# Patient Record
Sex: MALE | Race: WHITE | Employment: FULL TIME | URBAN - METROPOLITAN AREA
[De-identification: names, ages, dates, MRNs, and addresses within clinical notes are randomized per-mention and may not be internally consistent; named-entity substitution may affect disease eponyms.]

---

## 2017-04-04 ENCOUNTER — LAB ENCOUNTER (OUTPATIENT)
Dept: LAB | Age: 47
End: 2017-04-04
Attending: FAMILY MEDICINE
Payer: COMMERCIAL

## 2017-04-04 ENCOUNTER — OFFICE VISIT (OUTPATIENT)
Dept: FAMILY MEDICINE CLINIC | Facility: CLINIC | Age: 47
End: 2017-04-04

## 2017-04-04 VITALS
DIASTOLIC BLOOD PRESSURE: 84 MMHG | BODY MASS INDEX: 25.2 KG/M2 | HEIGHT: 71 IN | SYSTOLIC BLOOD PRESSURE: 142 MMHG | WEIGHT: 180 LBS | OXYGEN SATURATION: 95 % | HEART RATE: 84 BPM | RESPIRATION RATE: 16 BRPM

## 2017-04-04 DIAGNOSIS — Z00.00 LABORATORY EXAMINATION ORDERED AS PART OF A ROUTINE GENERAL MEDICAL EXAMINATION: ICD-10-CM

## 2017-04-04 DIAGNOSIS — Z91.09 ENVIRONMENTAL ALLERGIES: ICD-10-CM

## 2017-04-04 DIAGNOSIS — Z00.00 WELL ADULT EXAM: Primary | ICD-10-CM

## 2017-04-04 DIAGNOSIS — R73.01 IMPAIRED FASTING GLUCOSE: ICD-10-CM

## 2017-04-04 DIAGNOSIS — J45.40 ASTHMA, MODERATE PERSISTENT, POORLY-CONTROLLED: ICD-10-CM

## 2017-04-04 PROCEDURE — 83036 HEMOGLOBIN GLYCOSYLATED A1C: CPT

## 2017-04-04 PROCEDURE — 99396 PREV VISIT EST AGE 40-64: CPT | Performed by: FAMILY MEDICINE

## 2017-04-04 PROCEDURE — 82306 VITAMIN D 25 HYDROXY: CPT

## 2017-04-04 PROCEDURE — 80061 LIPID PANEL: CPT

## 2017-04-04 PROCEDURE — 36415 COLL VENOUS BLD VENIPUNCTURE: CPT

## 2017-04-04 PROCEDURE — 85025 COMPLETE CBC W/AUTO DIFF WBC: CPT

## 2017-04-04 PROCEDURE — 80053 COMPREHEN METABOLIC PANEL: CPT

## 2017-04-04 PROCEDURE — 84443 ASSAY THYROID STIM HORMONE: CPT

## 2017-04-04 RX ORDER — MONTELUKAST SODIUM 10 MG/1
10 TABLET ORAL NIGHTLY
Qty: 30 TABLET | Refills: 1 | Status: SHIPPED | OUTPATIENT
Start: 2017-04-04 | End: 2017-05-04

## 2017-04-04 RX ORDER — BUDESONIDE AND FORMOTEROL FUMARATE DIHYDRATE 160; 4.5 UG/1; UG/1
2 AEROSOL RESPIRATORY (INHALATION) 2 TIMES DAILY
Qty: 5 INHALER | Refills: 0 | COMMUNITY
Start: 2017-04-04 | End: 2017-10-31

## 2017-04-04 NOTE — PATIENT INSTRUCTIONS
Routine Healthcare for Men   Routine checkups can find treatable problems early. For many medical problems, early treatment can help prevent more serious complications. The value of checkups and how often you have them depend mainly on your age.  Your perso controversial. Many studies have been done, but they do not yet show that it is practical to do it on all men at their checkups. The test often gives misleading results and can cause undue anxiety, expense, and unnecessary medical procedures.  You should di whooping cough (pertussis) as well as tetanus. If you are 72 or older, this new vaccine has not yet been approved for your age group.  Because babies are most susceptible to complications from whooping cough, Tdap is especially recommended for adults caring vegetables in your diet. Get regular physical activity or exercise. Injury prevention: Use lap and shoulder belts when you drive. Use a helmet when you ride a motorcycle or bicycle.  If you are around guns or other firearms, practice safe handling and warren

## 2017-04-04 NOTE — PROGRESS NOTES
Hao Pop is a 55year old male who presents for a complete physical exam.   HPI:   Pt complains of using albuterol more lately. Has not had any Advair or Symbicort due to insurance coverage.  Was laid off last year and is working again but insurance 8.25 (H) <=0.34 kU/L   Allergen, [place] IgE 0.18 <=0.34 kU/L   Allergen, Short Ragweed IgE 5.46 (H) <=0.34 kU/L   Allergen, Pigweed IgE 0.88 (H) <=0.34 kU/L   Allergen, Russian Thistle IgE 1.52 (H) <=0.34 kU/L   Allergen, [place] Elder IgE 1.15 (H) < doesn't watch     REVIEW OF SYSTEMS:   GENERAL: feels well overall, denies fever or chills, denies change in weight  SKIN: denies any unusual skin lesions  EYES: denies changes in vision, doesn't wear glasses or contacts, cannot recall last eye exam  HENT: penile lesions  RECTAL: external anal sphincter appears normal, normal rectal tone, no masses,   Prostate: smooth, normal contours, NT, normal size.  No stool for hemoccult test.   MUSCULOSKELETAL: Back with normal AROM, no joint swelling, extremities x 4 w allergies  - Takes Zyrtec daily  - Will try and resend Singulair to pharmacy, pt unsure on cost, used previously in 2013 but was unable to afford at that time   Samples given of Symbicort patient will call when his samples run out.     3.. Laboratory examin history of type 2 diabetes   Colorectal cancer test: if you are 48 or older.  Recommended tests include a yearly test for blood in the stool, called the fecal occult blood test (FOBT) or fecal immunochemical test (FIT), and one of the following tests:   sig Remember, these are the minimum recommendations for routine tests. You and your healthcare provider must discuss what is right for you based on your symptoms and your personal and family medical history.    Many other tests are often done at routine check B is common. Pneumococcal pneumonia shot if you are age 72 or older. You may need to get it at a younger age if you have a high-risk medical condition, such as diabetes. Varicella (chickenpox) if you have never had chickenpox.    Zoster (shingles) vacci

## 2017-04-05 NOTE — PROGRESS NOTES
Quick Note:    Add hemoglobin A1c  Lab results showed low Vitamin D. Advise RX vitamin D 50,000 IU once weekly for 12 weeks. Recheck vitamin D level in 3 months. After labs we will direct you on the dose of vitamin D needed OTC.   Elevated neutrophils if an

## 2017-04-06 DIAGNOSIS — R73.01 IMPAIRED FASTING GLUCOSE: Primary | ICD-10-CM

## 2017-04-07 ENCOUNTER — TELEPHONE (OUTPATIENT)
Dept: FAMILY MEDICINE CLINIC | Facility: CLINIC | Age: 47
End: 2017-04-07

## 2017-04-07 DIAGNOSIS — E55.9 VITAMIN D DEFICIENCY: Primary | ICD-10-CM

## 2017-04-07 RX ORDER — ERGOCALCIFEROL 1.25 MG/1
50000 CAPSULE ORAL WEEKLY
Qty: 12 CAPSULE | Refills: 0 | Status: SHIPPED | OUTPATIENT
Start: 2017-04-07 | End: 2017-07-06

## 2017-04-07 NOTE — TELEPHONE ENCOUNTER
----- Message from Aicha Zafar PA-C sent at 4/4/2017  8:38 PM CDT -----  Add hemoglobin A1c  Lab results showed low Vitamin D. Advise RX vitamin D 50,000 IU once weekly for 12 weeks. Recheck vitamin D level in 3 months.   After labs we will direct yo

## 2017-04-07 NOTE — TELEPHONE ENCOUNTER
Notes Recorded by Rosalinda Valdes PA-C on 4/6/2017 at 4:47 PM  Normal HBA1C no diabetes.   Sent to my chart    Med sent to pharmacy and lab in system

## 2017-05-04 ENCOUNTER — OFFICE VISIT (OUTPATIENT)
Dept: FAMILY MEDICINE CLINIC | Facility: CLINIC | Age: 47
End: 2017-05-04

## 2017-05-04 VITALS
WEIGHT: 183 LBS | HEIGHT: 71 IN | HEART RATE: 84 BPM | OXYGEN SATURATION: 98 % | BODY MASS INDEX: 25.62 KG/M2 | SYSTOLIC BLOOD PRESSURE: 110 MMHG | DIASTOLIC BLOOD PRESSURE: 62 MMHG

## 2017-05-04 DIAGNOSIS — J30.1 SEASONAL ALLERGIC RHINITIS DUE TO POLLEN: Primary | ICD-10-CM

## 2017-05-04 DIAGNOSIS — J45.40 MODERATE PERSISTENT ASTHMA WITHOUT COMPLICATION: ICD-10-CM

## 2017-05-04 PROCEDURE — 99213 OFFICE O/P EST LOW 20 MIN: CPT | Performed by: FAMILY MEDICINE

## 2017-05-04 RX ORDER — MONTELUKAST SODIUM 10 MG/1
10 TABLET ORAL NIGHTLY
Qty: 30 TABLET | Refills: 11 | Status: SHIPPED | OUTPATIENT
Start: 2017-05-04 | End: 2018-05-27

## 2017-05-04 NOTE — PROGRESS NOTES
Samira Longoria is a 52year old male. HPI:   #1 follow up asthma/allergies  Singulair everyday. No cough no night time snoring now or congestion. Breathing good no shortness, quick acting 5 times per week at most using it less and less.  Using it whe rashes  RESPIRATORY: shortness of breath with exertion  CARDIOVASCULAR: denies chest pain on exertion  GI: denies abdominal pain and denies heartburn  NEURO: denies headaches  Musculoskeletal: No motor deficits    EXAM:   /62 mmHg  Pulse 84  Ht 71\"

## 2017-10-31 ENCOUNTER — OFFICE VISIT (OUTPATIENT)
Dept: FAMILY MEDICINE CLINIC | Facility: CLINIC | Age: 47
End: 2017-10-31

## 2017-10-31 VITALS
WEIGHT: 186 LBS | OXYGEN SATURATION: 98 % | HEIGHT: 71 IN | DIASTOLIC BLOOD PRESSURE: 80 MMHG | SYSTOLIC BLOOD PRESSURE: 114 MMHG | BODY MASS INDEX: 26.04 KG/M2 | HEART RATE: 64 BPM

## 2017-10-31 DIAGNOSIS — J45.40 MODERATE PERSISTENT ASTHMA WITHOUT COMPLICATION: Primary | ICD-10-CM

## 2017-10-31 DIAGNOSIS — J30.1 CHRONIC SEASONAL ALLERGIC RHINITIS DUE TO POLLEN: ICD-10-CM

## 2017-10-31 DIAGNOSIS — Z91.09 ENVIRONMENTAL ALLERGIES: ICD-10-CM

## 2017-10-31 PROCEDURE — 99213 OFFICE O/P EST LOW 20 MIN: CPT | Performed by: FAMILY MEDICINE

## 2017-10-31 RX ORDER — BUDESONIDE AND FORMOTEROL FUMARATE DIHYDRATE 160; 4.5 UG/1; UG/1
2 AEROSOL RESPIRATORY (INHALATION) 2 TIMES DAILY
Qty: 7 INHALER | Refills: 0 | COMMUNITY
Start: 2017-10-31 | End: 2018-04-05

## 2017-10-31 RX ORDER — ALBUTEROL SULFATE 90 UG/1
2 AEROSOL, METERED RESPIRATORY (INHALATION)
Qty: 1 INHALER | Refills: 1 | Status: SHIPPED | OUTPATIENT
Start: 2017-10-31 | End: 2018-03-14

## 2017-10-31 RX ORDER — BUDESONIDE AND FORMOTEROL FUMARATE DIHYDRATE 160; 4.5 UG/1; UG/1
2 AEROSOL RESPIRATORY (INHALATION) 2 TIMES DAILY
Qty: 1 INHALER | Refills: 5 | Status: SHIPPED | OUTPATIENT
Start: 2017-10-31 | End: 2018-04-05

## 2017-10-31 NOTE — PROGRESS NOTES
Sari Johnston is a 52year old male. HPI:   Patient is in for follow-up on asthma patient has a high deductible insurance plan is to pay cash for his prescriptions.   Ran out of the Symbicort samples 3 weeks ago started using the albuterol again 4-5 time REVIEW OF SYSTEMS:   GENERAL HEALTH: feels well otherwise  SKIN: denies any unusual skin lesions or rashes  RESPIRATORY: Without steroid inhaler albuterol gets shortness of breath with exertion otherwise has no issues  CARDIOVASCULAR: denies chest pain Inhale 2 puffs into the lungs 2 (two) times daily. Imaging & Consults:  None  1. Moderate persistent asthma without complication  Continue with singular  - Albuterol Sulfate HFA (VENTOLIN HFA) 108 (90 Base) MCG/ACT Inhalation Aero Soln;  Inhale 2

## 2018-04-05 ENCOUNTER — OFFICE VISIT (OUTPATIENT)
Dept: FAMILY MEDICINE CLINIC | Facility: CLINIC | Age: 48
End: 2018-04-05

## 2018-04-05 ENCOUNTER — LAB ENCOUNTER (OUTPATIENT)
Dept: LAB | Age: 48
End: 2018-04-05
Attending: FAMILY MEDICINE
Payer: COMMERCIAL

## 2018-04-05 VITALS
WEIGHT: 178 LBS | SYSTOLIC BLOOD PRESSURE: 110 MMHG | HEART RATE: 72 BPM | HEIGHT: 71 IN | BODY MASS INDEX: 24.92 KG/M2 | DIASTOLIC BLOOD PRESSURE: 78 MMHG

## 2018-04-05 DIAGNOSIS — R79.89 LOW VITAMIN D LEVEL: ICD-10-CM

## 2018-04-05 DIAGNOSIS — E55.9 VITAMIN D DEFICIENCY: ICD-10-CM

## 2018-04-05 DIAGNOSIS — J45.40 MODERATE PERSISTENT ASTHMA WITHOUT COMPLICATION: ICD-10-CM

## 2018-04-05 DIAGNOSIS — S39.012A STRAIN OF LUMBAR REGION, INITIAL ENCOUNTER: ICD-10-CM

## 2018-04-05 DIAGNOSIS — Z00.00 LABORATORY EXAMINATION ORDERED AS PART OF A ROUTINE GENERAL MEDICAL EXAMINATION: ICD-10-CM

## 2018-04-05 DIAGNOSIS — Z00.00 WELL ADULT EXAM: Primary | ICD-10-CM

## 2018-04-05 PROCEDURE — 82306 VITAMIN D 25 HYDROXY: CPT

## 2018-04-05 PROCEDURE — 84443 ASSAY THYROID STIM HORMONE: CPT

## 2018-04-05 PROCEDURE — 94010 BREATHING CAPACITY TEST: CPT | Performed by: FAMILY MEDICINE

## 2018-04-05 PROCEDURE — 99213 OFFICE O/P EST LOW 20 MIN: CPT | Performed by: FAMILY MEDICINE

## 2018-04-05 PROCEDURE — 80053 COMPREHEN METABOLIC PANEL: CPT

## 2018-04-05 PROCEDURE — 80061 LIPID PANEL: CPT

## 2018-04-05 PROCEDURE — 36415 COLL VENOUS BLD VENIPUNCTURE: CPT

## 2018-04-05 PROCEDURE — 99396 PREV VISIT EST AGE 40-64: CPT | Performed by: FAMILY MEDICINE

## 2018-04-05 PROCEDURE — 80050 GENERAL HEALTH PANEL: CPT

## 2018-04-05 PROCEDURE — 84439 ASSAY OF FREE THYROXINE: CPT

## 2018-04-05 RX ORDER — BUDESONIDE AND FORMOTEROL FUMARATE DIHYDRATE 160; 4.5 UG/1; UG/1
2 AEROSOL RESPIRATORY (INHALATION) 2 TIMES DAILY
Qty: 3 INHALER | Refills: 0 | COMMUNITY
Start: 2018-04-05 | End: 2018-06-22

## 2018-04-05 NOTE — PROGRESS NOTES
Gissell Person is a 52year old male who presents for a complete physical exam.   HPI:   Pt complains of Lower back pain started Sunday much better since last night. Patient denies any numbness, tingling or radiating pain.   Pain sits at the lower waist. HDL Cholesterol 69 >45 mg/dL   LDL Cholesterol 109 <130 mg/dL   VLDL 15 5 - 40 mg/dL   Chol/HDL Ratio 2.80 <4.97   Non HDL Chol 124 <130 mg/dL   -VITAMIN D, 25-HYDROXY   Result Value Ref Range   25-Hydroxyvitamin D (Total) 27.4 (L) 30.0 - 100.0 ng/mL   - Rfl:       Past Medical History:   Diagnosis Date   • Abscess of lung with pneumonia ([facility] [address] 04/2009    [facility]   • Asthma    • Chronic rhinitis     seasonal   • Extrinsic asthma, unspecified      mainly issue during allergy season, well contro of neck glands/polyuria/polydypsia  ALL/ASTHMA: denies allergic rhinitis/asthma    EXAM:   /78 (BP Location: Right arm, Patient Position: Sitting, Cuff Size: adult)   Pulse 72   Ht 71\"   Wt 178 lb   BMI 24.83 kg/m²   Body mass index is 24.83 kg/m². 04/01/2016      ASSESSMENT AND PLAN:   Rafael May is a 52year old male who presents for a complete physical exam.   Well adult exam  (primary encounter diagnosis)  Moderate persistent asthma without complication  Strain of lumbar region, recommendations and agrees to the plan. The patient is asked to return for CPX in 1 year every 6 months for astham and as needed. Also, for nurse visit in the Fall for influenza immunization.

## 2018-04-07 NOTE — PROGRESS NOTES
Cholesterol slightly increased from 1 year ago watch saturated fats repeat yearly. Improved Vitamin D take maintenance of dose of 1,000 iu vitamin D OTC.   Rest of labs are normal  To my chart

## 2018-05-27 DIAGNOSIS — J45.40 MODERATE PERSISTENT ASTHMA WITHOUT COMPLICATION: ICD-10-CM

## 2018-05-28 RX ORDER — MONTELUKAST SODIUM 10 MG/1
10 TABLET ORAL NIGHTLY
Qty: 90 TABLET | Refills: 0 | Status: SHIPPED | OUTPATIENT
Start: 2018-05-28 | End: 2018-08-24

## 2018-05-28 RX ORDER — ALBUTEROL SULFATE 90 UG/1
2 AEROSOL, METERED RESPIRATORY (INHALATION)
Qty: 1 INHALER | Refills: 1 | Status: SHIPPED | OUTPATIENT
Start: 2018-05-28 | End: 2020-01-15

## 2018-05-28 NOTE — TELEPHONE ENCOUNTER
From: Alonzo Jerome  Sent: 5/27/2018 4:50 PM CDT  Subject: Medication Renewal Request    Marshall JOJO Perez would like a refill of the following medications:     Montelukast Sodium 10 MG Oral Tab Sil Palencia PA-C]     Albuterol Sulfate HFA (VENTOLIN

## 2018-06-21 ENCOUNTER — PATIENT MESSAGE (OUTPATIENT)
Dept: FAMILY MEDICINE CLINIC | Facility: CLINIC | Age: 48
End: 2018-06-21

## 2018-06-21 DIAGNOSIS — J45.40 MODERATE PERSISTENT ASTHMA WITHOUT COMPLICATION: ICD-10-CM

## 2018-06-22 ENCOUNTER — PATIENT MESSAGE (OUTPATIENT)
Dept: FAMILY MEDICINE CLINIC | Facility: CLINIC | Age: 48
End: 2018-06-22

## 2018-06-22 DIAGNOSIS — J45.40 MODERATE PERSISTENT ASTHMA WITHOUT COMPLICATION: ICD-10-CM

## 2018-06-22 RX ORDER — BUDESONIDE AND FORMOTEROL FUMARATE DIHYDRATE 160; 4.5 UG/1; UG/1
2 AEROSOL RESPIRATORY (INHALATION) 2 TIMES DAILY
Qty: 3 INHALER | Refills: 0 | Status: SHIPPED | OUTPATIENT
Start: 2018-06-22 | End: 2018-07-26

## 2018-06-22 NOTE — TELEPHONE ENCOUNTER
----- Message from [employer]. Franklin Ralph sent at 6/21/2018  6:37 PM CDT -----  Regarding: Prescription Question  Contact: [phone]  Ant Goel, hope your summer is going well so far!  Can you send in a prescription for the Symbacort so I can see if the [facility]

## 2018-07-19 NOTE — TELEPHONE ENCOUNTER
----- Message from [employer] Energy[employer]areed Meek Kapoor sent at 7/19/2018  1:41 PM CDT -----  Regarding: RE: RE: RE: RE: RE: Prescription Question  Contact: [phone]  Yes address is correct. Can you ask Lacie Scheuermann if there are any generic forms of Symbacort?  I am having tro AM  To: Lewis Mesa  Subject: RE: Prescription Question    Can I see if we have any coupon cards that may help you out? Or have you looked at www.[employer]? Thanks  Ronnie Must    ----- Message -----     From: Lewis Ribera Left: 6/22/2018 11:31

## 2018-07-19 NOTE — TELEPHONE ENCOUNTER
Coupon card mailed to patient for Symbicort. In the meantime, Vickey Larsen would you want him to try/do any other inhaler?   thanks

## 2018-07-26 RX ORDER — BUDESONIDE AND FORMOTEROL FUMARATE DIHYDRATE 160; 4.5 UG/1; UG/1
2 AEROSOL RESPIRATORY (INHALATION) 2 TIMES DAILY
Qty: 3 INHALER | Refills: 0 | Status: SHIPPED | OUTPATIENT
Start: 2018-07-26 | End: 2018-11-07

## 2018-07-26 NOTE — TELEPHONE ENCOUNTER
----- Message from [employer]. Robbadriángonzalez Garcia sent at 7/26/2018  1:32 PM CDT -----  Regarding: RE: RE: RE: RE: RE: RE: RE: RE: RE: Prescription Question  Contact: [phone]  Good afternoon. I received the coupon card yesterday.  Can you call in another prescriptio to give you advair samples and we have 2 samples here if you would like them? There are no generic forms of Symbicort and the Advair may help until you get going on the Symbicort with the coupon card.   Let me know  Thanks  Dee Saleh    ----- Message ----- 6/22/2018 11:54 AM CDT       To: David Amaro PA-C  Subject: RE: RE: Prescription Question      If you have any coupon cards that may help. I'll check out the website again too.     Thanks,  Jose Bishop  ----- Message -----  From: Rickie Romero  Sent: 6/22/18 11:52

## 2018-08-24 RX ORDER — MONTELUKAST SODIUM 10 MG/1
10 TABLET ORAL NIGHTLY
Qty: 90 TABLET | Refills: 1 | Status: SHIPPED
Start: 2018-08-24 | End: 2019-02-24

## 2018-08-24 NOTE — TELEPHONE ENCOUNTER
From: Vilma Linton  Sent: 8/24/2018 9:29 AM CDT  Subject: Medication Renewal Request    Marshall JOJO Vidales would like a refill of the following medications:     Montelukast Sodium 10 MG Oral Tab John Kelley PA-C]    Preferred pharmacy: [facility]

## 2018-11-06 ENCOUNTER — PATIENT MESSAGE (OUTPATIENT)
Dept: FAMILY MEDICINE CLINIC | Facility: CLINIC | Age: 48
End: 2018-11-06

## 2018-11-06 DIAGNOSIS — J45.40 MODERATE PERSISTENT ASTHMA WITHOUT COMPLICATION: ICD-10-CM

## 2018-11-07 RX ORDER — BUDESONIDE AND FORMOTEROL FUMARATE DIHYDRATE 160; 4.5 UG/1; UG/1
2 AEROSOL RESPIRATORY (INHALATION) 2 TIMES DAILY
Qty: 3 INHALER | Refills: 0 | Status: SHIPPED | OUTPATIENT
Start: 2018-11-07 | End: 2019-03-12

## 2018-11-07 NOTE — TELEPHONE ENCOUNTER
Regarding: Non-Urgent Medical Question  Contact: [phone]  ----- Message from [employer] sent at 11/6/2018  6:58 PM CST -----    Good morning. Can you call in a new symbacort prescription please? I will be out in about a week. Thanks!   Wen Banda

## 2019-02-25 RX ORDER — MONTELUKAST SODIUM 10 MG/1
10 TABLET ORAL NIGHTLY
Qty: 90 TABLET | Refills: 0 | Status: SHIPPED | OUTPATIENT
Start: 2019-02-25 | End: 2019-05-29

## 2019-03-12 DIAGNOSIS — J45.40 MODERATE PERSISTENT ASTHMA WITHOUT COMPLICATION: ICD-10-CM

## 2019-03-13 RX ORDER — BUDESONIDE AND FORMOTEROL FUMARATE DIHYDRATE 160; 4.5 UG/1; UG/1
2 AEROSOL RESPIRATORY (INHALATION) 2 TIMES DAILY
Qty: 1 INHALER | Refills: 0 | Status: SHIPPED | OUTPATIENT
Start: 2019-03-13 | End: 2019-04-23

## 2019-03-13 NOTE — TELEPHONE ENCOUNTER
Inhaler approved qty 1 NR  Patient due for annual wellness 4/6/19 or later  Please call to schedule appt-will need for any further refills  [phone] (home)

## 2019-04-23 ENCOUNTER — OFFICE VISIT (OUTPATIENT)
Dept: FAMILY MEDICINE CLINIC | Facility: CLINIC | Age: 49
End: 2019-04-23
Payer: COMMERCIAL

## 2019-04-23 VITALS
WEIGHT: 181 LBS | HEART RATE: 69 BPM | HEIGHT: 71 IN | DIASTOLIC BLOOD PRESSURE: 76 MMHG | SYSTOLIC BLOOD PRESSURE: 120 MMHG | BODY MASS INDEX: 25.34 KG/M2

## 2019-04-23 DIAGNOSIS — Z00.00 LABORATORY EXAMINATION ORDERED AS PART OF A ROUTINE GENERAL MEDICAL EXAMINATION: ICD-10-CM

## 2019-04-23 DIAGNOSIS — J45.40 MODERATE PERSISTENT ASTHMA WITHOUT COMPLICATION: ICD-10-CM

## 2019-04-23 DIAGNOSIS — Z00.00 ROUTINE GENERAL MEDICAL EXAMINATION AT A HEALTH CARE FACILITY: Primary | ICD-10-CM

## 2019-04-23 DIAGNOSIS — E55.9 VITAMIN D DEFICIENCY: ICD-10-CM

## 2019-04-23 PROBLEM — S39.012A STRAIN OF LUMBAR REGION: Status: RESOLVED | Noted: 2018-04-05 | Resolved: 2019-04-23

## 2019-04-23 PROCEDURE — 99396 PREV VISIT EST AGE 40-64: CPT | Performed by: FAMILY MEDICINE

## 2019-04-23 RX ORDER — BUDESONIDE AND FORMOTEROL FUMARATE DIHYDRATE 160; 4.5 UG/1; UG/1
2 AEROSOL RESPIRATORY (INHALATION) 2 TIMES DAILY
Qty: 1 INHALER | Refills: 11 | Status: SHIPPED | OUTPATIENT
Start: 2019-04-23 | End: 2020-08-19

## 2019-04-23 NOTE — PROGRESS NOTES
Brina Marks is a 50year old male who presents for a complete physical exam.   HPI:   Here for CPE  Asthma does excellent with the Symbicort presently. Last colonoscopy believes it was in 2009 during hospitalization for lung abscess.   No FH colon can RBC 5.17 4.30 - 5.70 x10(6)uL    HGB 16.1 13.0 - 17.0 g/dL    HCT 49.3 37.0 - 53.0 %    .0 150.0 - 450.0 10(3)uL    MCV 95.4 80.0 - 99.0 fL    MCH 31.1 27.0 - 33.2 pg    MCHC 32.7 31.0 - 37.0 g/dL    RDW 12.0 11.5 - 16.0 %    RDW-SD 42.5 35.1 - 46. 3 status: Former Smoker        Packs/day: 1.50        Years: 20.00        Pack years: 30        Types: Cigarettes        Quit date: 4/3/2009        Years since quitting: 10.0      Smokeless tobacco: Former User        Types: [address] date: 1/1/2009 non-injected and non-icteric  NECK: supple, no adenopathy/thyromegaly/thyroid nodules/masses  CHEST: no chest tenderness  BREAST: no suspicious masses appreciated on palpation  LUNGS: CTA A/P, no wheezes/ronchi/rales/crackles, normal air excursion  CARDIOV Budesonide-Formoterol Fumarate 160-4.5 MCG/ACT Inhalation Aerosol 1 Inhaler 11     Sig: Inhale 2 puffs into the lungs 2 (two) times daily. Imaging & Consults:  None  1.  Routine general medical examination at a health care facility  Recommend healthy

## 2019-05-29 RX ORDER — MONTELUKAST SODIUM 10 MG/1
10 TABLET ORAL NIGHTLY
Qty: 90 TABLET | Refills: 2 | Status: SHIPPED | OUTPATIENT
Start: 2019-05-29 | End: 2020-02-19

## 2019-09-12 ENCOUNTER — PATIENT MESSAGE (OUTPATIENT)
Dept: FAMILY MEDICINE CLINIC | Facility: CLINIC | Age: 49
End: 2019-09-12

## 2019-09-12 NOTE — TELEPHONE ENCOUNTER
From: Bunny Juares  To: Tatyana Eng PA-C  Sent: 9/12/2019 7:17 AM CDT  Subject: Other    Good morning. I think this is a sty. Should I be doing anything to treat it or just wait 7-10 like I've been reading on line for it to go away?   Thanks,  Taye Salas

## 2019-09-14 ENCOUNTER — PATIENT MESSAGE (OUTPATIENT)
Dept: FAMILY MEDICINE CLINIC | Facility: CLINIC | Age: 49
End: 2019-09-14

## 2019-09-16 NOTE — TELEPHONE ENCOUNTER
From: Stacia Reyes  To: Lin Arias PA-C  Sent: 9/14/2019 2:07 PM CDT  Subject: Other    I got my flu shot today 9.14.19 @ [facility] [place] @ [address] [place] [address].   Thanks,  [employer]

## 2020-01-06 ENCOUNTER — PATIENT MESSAGE (OUTPATIENT)
Dept: FAMILY MEDICINE CLINIC | Facility: CLINIC | Age: 50
End: 2020-01-06

## 2020-01-07 ENCOUNTER — OFFICE VISIT (OUTPATIENT)
Dept: FAMILY MEDICINE CLINIC | Facility: CLINIC | Age: 50
End: 2020-01-07
Payer: COMMERCIAL

## 2020-01-07 ENCOUNTER — PATIENT MESSAGE (OUTPATIENT)
Dept: FAMILY MEDICINE CLINIC | Facility: CLINIC | Age: 50
End: 2020-01-07

## 2020-01-07 ENCOUNTER — HOSPITAL ENCOUNTER (OUTPATIENT)
Dept: GENERAL RADIOLOGY | Age: 50
Discharge: HOME | End: 2020-01-07
Attending: FAMILY MEDICINE
Payer: COMMERCIAL

## 2020-01-07 VITALS
TEMPERATURE: 98 F | BODY MASS INDEX: 24.92 KG/M2 | SYSTOLIC BLOOD PRESSURE: 138 MMHG | WEIGHT: 180 LBS | DIASTOLIC BLOOD PRESSURE: 84 MMHG | HEART RATE: 76 BPM | OXYGEN SATURATION: 98 % | HEIGHT: 71.14 IN

## 2020-01-07 DIAGNOSIS — J45.41 MODERATE PERSISTENT ASTHMA WITH ACUTE EXACERBATION: ICD-10-CM

## 2020-01-07 DIAGNOSIS — R07.9 RIGHT-SIDED CHEST PAIN: ICD-10-CM

## 2020-01-07 DIAGNOSIS — Z87.09: ICD-10-CM

## 2020-01-07 DIAGNOSIS — R06.02 SHORTNESS OF BREATH: ICD-10-CM

## 2020-01-07 DIAGNOSIS — Z87.09 HISTORY OF PNEUMOTHORAX: ICD-10-CM

## 2020-01-07 DIAGNOSIS — J45.41 MODERATE PERSISTENT ASTHMA WITH ACUTE EXACERBATION: Primary | ICD-10-CM

## 2020-01-07 PROCEDURE — 99214 OFFICE O/P EST MOD 30 MIN: CPT | Performed by: FAMILY MEDICINE

## 2020-01-07 PROCEDURE — 71046 X-RAY EXAM CHEST 2 VIEWS: CPT | Performed by: FAMILY MEDICINE

## 2020-01-07 RX ORDER — METHYLPREDNISOLONE 4 MG/1
TABLET ORAL
Qty: 1 PACKAGE | Refills: 0 | Status: SHIPPED | OUTPATIENT
Start: 2020-01-07 | End: 2020-08-19 | Stop reason: ALTCHOICE

## 2020-01-07 NOTE — TELEPHONE ENCOUNTER
From: Naida Knows  To: Katy Xavier PA-C  Sent: 1/6/2020 5:00 PM CST  Subject: Other    Good afternoon. I hope everyone had a great holiday season! I have a question for Kayla Downey.  Over the past week or so I have been noticing an increased tightnes

## 2020-01-07 NOTE — TELEPHONE ENCOUNTER
Called patient back and advised [facility] had a cancellation at 9:30 this morning. Advised we recommend he be seen today. Patient states his symptoms are not as bad this morning but tend to progress throughout the day.  Denies radiating shoulder/arm/jaw

## 2020-01-07 NOTE — TELEPHONE ENCOUNTER
Left message for patient to call back. Will get more details on symptoms and will advise he proceed to immediate care or the ED as [facility] not have any availability this week.

## 2020-01-07 NOTE — PROGRESS NOTES
Chest XR shows hyperinflated lungs probably asthma. Start medrol dose andrea and update in 1-2 days. Take the Albuterol every 4-6 hours. Continue with Symbicort. Call patient with results.

## 2020-01-07 NOTE — TELEPHONE ENCOUNTER
From: Ricky Goltz  To:  Stalin Kline PA-C  Sent: 1/7/2020 12:53 PM CST  Subject: Prescription Question    I have a question about X-Ray Chest resulted on 1/7/20 at 11:38 AM.    Will there be a [facility] at my [facility] today that I need to

## 2020-01-07 NOTE — PROGRESS NOTES
HPI:   Santa Carey is a 52year old male who presents for possible asthma sxs for  7  days. Patient reports chest discomfort right side occasional shortness of breath helps to use albuterol.  Did reduce the Symbicort November then 1 month ago restarted Problem Relation Age of Onset   • Musculo-skelatal Disorder Father         back issues   • Lipids Father    • Musculo-skelatal Disorder Mother         hip replacement      Social History    Tobacco Use      Smoking status: Former Smoker        Packs/day: presents with   Right-sided chest pain  (primary encounter diagnosis)  History of abscess of lung  Shortness of breath  Moderate persistent asthma with acute exacerbation  History of pneumothorax    No orders of the defined types were placed in this encoun

## 2020-01-09 ENCOUNTER — TELEPHONE (OUTPATIENT)
Dept: FAMILY MEDICINE CLINIC | Facility: CLINIC | Age: 50
End: 2020-01-09

## 2020-01-09 NOTE — TELEPHONE ENCOUNTER
Per Aicha Zafar PA-C, the patient was contacted for a condition update. The patient reported that he is doing real good and is feeling better, although his symptoms are no completely gone yet.   He stated that his chest pain/pressure is minimal now,

## 2020-01-13 ENCOUNTER — PATIENT MESSAGE (OUTPATIENT)
Dept: FAMILY MEDICINE CLINIC | Facility: CLINIC | Age: 50
End: 2020-01-13

## 2020-01-13 DIAGNOSIS — R07.9 CHEST PAIN, UNSPECIFIED TYPE: Primary | ICD-10-CM

## 2020-01-13 NOTE — TELEPHONE ENCOUNTER
From: Stacia Reyes  To: Bertis Litten, PA-C  Sent: 1/13/2020 9:18 AM CST  Subject: Visit Follow-up Question    Good morning. Just wanted to touch base with you. I finished the medication yesterday but there is still some discomfort on my right side.

## 2020-01-14 ENCOUNTER — HOSPITAL ENCOUNTER (OUTPATIENT)
Dept: CT IMAGING | Age: 50
Discharge: HOME | End: 2020-01-14
Attending: FAMILY MEDICINE
Payer: COMMERCIAL

## 2020-01-14 ENCOUNTER — TELEPHONE (OUTPATIENT)
Dept: FAMILY MEDICINE CLINIC | Facility: CLINIC | Age: 50
End: 2020-01-14

## 2020-01-14 DIAGNOSIS — R07.9 CHEST PAIN, UNSPECIFIED TYPE: ICD-10-CM

## 2020-01-14 LAB — CREAT BLD-MCNC: 1.2 MG/DL (ref 0.7–1.3)

## 2020-01-14 PROCEDURE — 71275 CT ANGIOGRAPHY CHEST: CPT | Performed by: FAMILY MEDICINE

## 2020-01-14 PROCEDURE — 82565 ASSAY OF CREATININE: CPT

## 2020-01-14 NOTE — TELEPHONE ENCOUNTER
CTA chest ordered and referral department notified. Patient notified and will call him back once approved.

## 2020-01-14 NOTE — TELEPHONE ENCOUNTER
=====  CONCLUSION:       1. No acute cardiopulmonary process. No evidence of pulmonary embolus. 2. Prominent paratracheal lymph nodes which are likely inflammatory. A 6-12 month follow-up could be performed to ensure stability of the lymph nodes.

## 2020-01-14 NOTE — TELEPHONE ENCOUNTER
Per [address], negative for pulmonary process or infections. Use Albuterol every 4-6 hours as needed. Relayed results and recommendations to patient.  He verbalized understanding and agreed with plan

## 2020-01-14 NOTE — TELEPHONE ENCOUNTER
Approval for test received from referral department. Phoned scheduling department and test scheduled for 10:30 on [address]. Patient aware. VU and is in agreement.

## 2020-01-15 DIAGNOSIS — R59.9 ENLARGED LYMPH NODES: Primary | ICD-10-CM

## 2020-01-15 NOTE — PROGRESS NOTES
No pulmonary embolism or acute lung process. There are some lymph nodes most likely inflammatory in the paratracheal area do a CT chest  in 6 months to make sure lymph nodes have decreased.   Order future tests/medications sent to my chart

## 2020-02-19 ENCOUNTER — PATIENT MESSAGE (OUTPATIENT)
Dept: FAMILY MEDICINE CLINIC | Facility: CLINIC | Age: 50
End: 2020-02-19

## 2020-02-19 RX ORDER — MONTELUKAST SODIUM 10 MG/1
10 TABLET ORAL NIGHTLY
Qty: 90 TABLET | Refills: 2 | Status: SHIPPED | OUTPATIENT
Start: 2020-02-19 | End: 2020-08-19

## 2020-02-19 NOTE — TELEPHONE ENCOUNTER
Refill request for MONTELUKAST 10MG. Did not make protocol. Last fill 5/29/19 90 with 2 refills. Last OV 1/7/20.

## 2020-02-19 NOTE — TELEPHONE ENCOUNTER
From: Naida Penningtons  To: Katy Xavier PA-C  Sent: 2/19/2020 10:46 AM CST  Subject: Prescription Question    Good morning. When I renewed my Ventolin Albuterol it was replaced with another brand. Was this done by my insurance?     Thanks,  Dr [facility]

## 2020-03-19 ENCOUNTER — PATIENT MESSAGE (OUTPATIENT)
Dept: FAMILY MEDICINE CLINIC | Facility: CLINIC | Age: 50
End: 2020-03-19

## 2020-03-19 NOTE — TELEPHONE ENCOUNTER
From: Samantha Floyd  To: Guy Bower PA-C  Sent: 3/19/2020 11:15 AM CDT  Subject: Non-Urgent Medical Question    Good morning. Is it ok to schedule an annual physical right now or should I wait in light of all that is going on?     Darrell Juarez

## 2020-08-11 ENCOUNTER — PATIENT MESSAGE (OUTPATIENT)
Dept: FAMILY MEDICINE CLINIC | Facility: CLINIC | Age: 50
End: 2020-08-11

## 2020-08-11 NOTE — TELEPHONE ENCOUNTER
From: Brina Marks  To: Casey Campuzano PA-C  Sent: 8/11/2020 3:30 PM CDT  Subject: Other    Ant Vo. My daughter is having some health issues at the moment and I was wondering if you would be able to see her.  She has her own insurance etc. If she

## 2020-08-19 ENCOUNTER — OFFICE VISIT (OUTPATIENT)
Dept: FAMILY MEDICINE CLINIC | Facility: CLINIC | Age: 50
End: 2020-08-19
Payer: COMMERCIAL

## 2020-08-19 VITALS
DIASTOLIC BLOOD PRESSURE: 76 MMHG | BODY MASS INDEX: 24.92 KG/M2 | WEIGHT: 180 LBS | HEART RATE: 72 BPM | SYSTOLIC BLOOD PRESSURE: 120 MMHG | HEIGHT: 71.38 IN | TEMPERATURE: 97 F

## 2020-08-19 DIAGNOSIS — Z12.11 COLON CANCER SCREENING: ICD-10-CM

## 2020-08-19 DIAGNOSIS — J45.40 MODERATE PERSISTENT ASTHMA WITHOUT COMPLICATION: ICD-10-CM

## 2020-08-19 DIAGNOSIS — Z00.00 LABORATORY EXAMINATION ORDERED AS PART OF A ROUTINE GENERAL MEDICAL EXAMINATION: ICD-10-CM

## 2020-08-19 DIAGNOSIS — Z00.00 ROUTINE GENERAL MEDICAL EXAMINATION AT A HEALTH CARE FACILITY: Primary | ICD-10-CM

## 2020-08-19 PROCEDURE — 3078F DIAST BP <80 MM HG: CPT | Performed by: FAMILY MEDICINE

## 2020-08-19 PROCEDURE — 3074F SYST BP LT 130 MM HG: CPT | Performed by: FAMILY MEDICINE

## 2020-08-19 PROCEDURE — 99396 PREV VISIT EST AGE 40-64: CPT | Performed by: FAMILY MEDICINE

## 2020-08-19 PROCEDURE — 99212 OFFICE O/P EST SF 10 MIN: CPT | Performed by: FAMILY MEDICINE

## 2020-08-19 PROCEDURE — 3008F BODY MASS INDEX DOCD: CPT | Performed by: FAMILY MEDICINE

## 2020-08-19 RX ORDER — ALBUTEROL SULFATE 90 UG/1
2 AEROSOL, METERED RESPIRATORY (INHALATION)
Qty: 1 INHALER | Refills: 1 | Status: SHIPPED | OUTPATIENT
Start: 2020-08-19

## 2020-08-19 RX ORDER — BUDESONIDE AND FORMOTEROL FUMARATE DIHYDRATE 160; 4.5 UG/1; UG/1
2 AEROSOL RESPIRATORY (INHALATION) 2 TIMES DAILY
Qty: 1 INHALER | Refills: 11 | Status: SHIPPED | OUTPATIENT
Start: 2020-08-19 | End: 2021-04-07

## 2020-08-19 RX ORDER — MONTELUKAST SODIUM 10 MG/1
10 TABLET ORAL NIGHTLY
Qty: 90 TABLET | Refills: 3 | Status: SHIPPED | OUTPATIENT
Start: 2020-08-19 | End: 2020-11-16

## 2020-08-19 NOTE — PROGRESS NOTES
Naida Bradshaw is a 48year old male who presents for a complete physical exam.   HPI:   CPE  Exercise regular 3 days weight lifting and aerobic   Asthma does excellent with the Symbicort presently using 1 inhale bid will increase to 2 puffs bid daily.   P Extrinsic asthma, unspecified      mainly issue during allergy season, well controlled      Past Surgical History:   Procedure Laterality Date   • OTHER SURGICAL HISTORY  2009    lung abscess removal      Family History   Problem Relation Age of Onset   • weight gain/weight loss/enlargement of neck glands/polyuria/polydypsia  ALL/ASTHMA: allergic rhinitis/asthma    EXAM:   /76 (BP Location: Left arm, Patient Position: Sitting, Cuff Size: adult)   Pulse 72   Temp 97.2 °F (36.2 °C) (Skin)   Ht 71.38\" complete physical exam.   Routine general medical examination at a health care facility  (primary encounter diagnosis)  Moderate persistent asthma without complication  Colon cancer screening  Laboratory examination ordered as part of a routine general med by mouth nightly. Dispense: 90 tablet; Refill: 3    3. Colon cancer screening  - GASTRO - INTERNAL    4. Laboratory examination ordered as part of a routine general medical examination  - CBC WITH DIFFERENTIAL WITH PLATELET;  Future  - COMP METABOLIC PANEL

## 2020-09-21 ENCOUNTER — PATIENT MESSAGE (OUTPATIENT)
Dept: FAMILY MEDICINE CLINIC | Facility: CLINIC | Age: 50
End: 2020-09-21

## 2020-09-22 NOTE — TELEPHONE ENCOUNTER
From: Rafael May  To: Pam Cabrera PA-C  Sent: 9/21/2020 7:19 PM CDT  Subject: Other    I got my flu shot today from [facility] on  [address] in [facility].  I am planning to do my outstanding labs this Thursday but I most likely will need to resche

## 2020-11-16 DIAGNOSIS — J45.40 MODERATE PERSISTENT ASTHMA WITHOUT COMPLICATION: ICD-10-CM

## 2020-11-16 RX ORDER — MONTELUKAST SODIUM 10 MG/1
10 TABLET ORAL NIGHTLY
Qty: 90 TABLET | Refills: 3 | Status: SHIPPED | OUTPATIENT
Start: 2020-11-16 | End: 2021-11-26

## 2020-11-16 NOTE — TELEPHONE ENCOUNTER
Pt requesting refill of  Montelukast Sodium 10 MG Oral Tab     Passed protocol, refill approved, sent to pharmacy.

## 2020-12-15 ENCOUNTER — APPOINTMENT (OUTPATIENT)
Dept: CT IMAGING | Age: 50
DRG: 392 | End: 2020-12-15
Attending: EMERGENCY MEDICINE
Payer: COMMERCIAL

## 2020-12-15 ENCOUNTER — HOSPITAL ENCOUNTER (INPATIENT)
Facility: HOSPITAL | Age: 50
LOS: 5 days | Discharge: HOME | DRG: 392 | End: 2020-12-20
Attending: EMERGENCY MEDICINE | Admitting: HOSPITALIST
Payer: COMMERCIAL

## 2020-12-15 ENCOUNTER — PATIENT MESSAGE (OUTPATIENT)
Dept: FAMILY MEDICINE CLINIC | Facility: CLINIC | Age: 50
End: 2020-12-15

## 2020-12-15 DIAGNOSIS — K57.92 ACUTE DIVERTICULITIS: Primary | ICD-10-CM

## 2020-12-15 PROCEDURE — 99222 1ST HOSP IP/OBS MODERATE 55: CPT | Performed by: HOSPITALIST

## 2020-12-15 PROCEDURE — 74177 CT ABD & PELVIS W/CONTRAST: CPT | Performed by: EMERGENCY MEDICINE

## 2020-12-15 RX ORDER — MORPHINE SULFATE 2 MG/ML
1 INJECTION, SOLUTION INTRAMUSCULAR; INTRAVENOUS EVERY 2 HOUR PRN
Status: DISCONTINUED | OUTPATIENT
Start: 2020-12-15 | End: 2020-12-20

## 2020-12-15 RX ORDER — MORPHINE SULFATE 4 MG/ML
4 INJECTION, SOLUTION INTRAMUSCULAR; INTRAVENOUS EVERY 2 HOUR PRN
Status: DISCONTINUED | OUTPATIENT
Start: 2020-12-15 | End: 2020-12-20

## 2020-12-15 RX ORDER — ONDANSETRON 2 MG/ML
4 INJECTION INTRAMUSCULAR; INTRAVENOUS EVERY 6 HOURS PRN
Status: DISCONTINUED | OUTPATIENT
Start: 2020-12-15 | End: 2020-12-20

## 2020-12-15 RX ORDER — ACETAMINOPHEN 325 MG/1
650 TABLET ORAL EVERY 6 HOURS PRN
Status: DISCONTINUED | OUTPATIENT
Start: 2020-12-15 | End: 2020-12-20

## 2020-12-15 RX ORDER — ENOXAPARIN SODIUM 100 MG/ML
40 INJECTION SUBCUTANEOUS NIGHTLY
Status: DISCONTINUED | OUTPATIENT
Start: 2020-12-15 | End: 2020-12-20

## 2020-12-15 RX ORDER — MELATONIN
3 NIGHTLY PRN
Status: DISCONTINUED | OUTPATIENT
Start: 2020-12-15 | End: 2020-12-20

## 2020-12-15 RX ORDER — DEXTROSE, SODIUM CHLORIDE, SODIUM LACTATE, POTASSIUM CHLORIDE, AND CALCIUM CHLORIDE 5; .6; .31; .03; .02 G/100ML; G/100ML; G/100ML; G/100ML; G/100ML
INJECTION, SOLUTION INTRAVENOUS CONTINUOUS
Status: DISCONTINUED | OUTPATIENT
Start: 2020-12-15 | End: 2020-12-20

## 2020-12-15 RX ORDER — HYDROMORPHONE HYDROCHLORIDE 1 MG/ML
0.5 INJECTION, SOLUTION INTRAMUSCULAR; INTRAVENOUS; SUBCUTANEOUS ONCE
Status: COMPLETED | OUTPATIENT
Start: 2020-12-15 | End: 2020-12-15

## 2020-12-15 RX ORDER — HYDROMORPHONE HYDROCHLORIDE 1 MG/ML
1 INJECTION, SOLUTION INTRAMUSCULAR; INTRAVENOUS; SUBCUTANEOUS ONCE
Status: COMPLETED | OUTPATIENT
Start: 2020-12-15 | End: 2020-12-15

## 2020-12-15 RX ORDER — MORPHINE SULFATE 2 MG/ML
2 INJECTION, SOLUTION INTRAMUSCULAR; INTRAVENOUS EVERY 2 HOUR PRN
Status: DISCONTINUED | OUTPATIENT
Start: 2020-12-15 | End: 2020-12-20

## 2020-12-15 RX ORDER — METRONIDAZOLE 500 MG/100ML
500 INJECTION, SOLUTION INTRAVENOUS ONCE
Status: COMPLETED | OUTPATIENT
Start: 2020-12-15 | End: 2020-12-15

## 2020-12-15 RX ORDER — LEVOFLOXACIN 5 MG/ML
500 INJECTION, SOLUTION INTRAVENOUS ONCE
Status: COMPLETED | OUTPATIENT
Start: 2020-12-15 | End: 2020-12-15

## 2020-12-15 RX ORDER — ONDANSETRON 2 MG/ML
4 INJECTION INTRAMUSCULAR; INTRAVENOUS ONCE
Status: COMPLETED | OUTPATIENT
Start: 2020-12-15 | End: 2020-12-15

## 2020-12-15 NOTE — TELEPHONE ENCOUNTER
Called patient d/t complaint. Patient stated a couple days ago he had nausea and vomiting. That went away. Today, he woke up with abdominal pain at umbilicus and below. Went to work for [employer] but could not stand the pain so had to leave.   Advised neymar

## 2020-12-15 NOTE — TELEPHONE ENCOUNTER
From: Анна Hollis  To: Pooja Arriaga PA-C  Sent: 12/15/2020 11:22 AM CST  Subject: Other    Good morning. I woke up with extreme stomach pain today and I was wondering if anyone is available to speak with.

## 2020-12-16 PROCEDURE — 99233 SBSQ HOSP IP/OBS HIGH 50: CPT | Performed by: HOSPITALIST

## 2020-12-16 PROCEDURE — 99254 IP/OBS CNSLTJ NEW/EST MOD 60: CPT | Performed by: SURGERY

## 2020-12-16 RX ORDER — POTASSIUM CHLORIDE 20 MEQ/1
40 TABLET, EXTENDED RELEASE ORAL ONCE
Status: COMPLETED | OUTPATIENT
Start: 2020-12-16 | End: 2020-12-16

## 2020-12-16 RX ORDER — FAMOTIDINE 10 MG/ML
20 INJECTION, SOLUTION INTRAVENOUS 2 TIMES DAILY
Status: DISCONTINUED | OUTPATIENT
Start: 2020-12-16 | End: 2020-12-20

## 2020-12-16 NOTE — PLAN OF CARE
Received patient at 2200 from  ED. AO x4. No tele. O2 sat adequate on room air. MD notified of completed med rec. No complaint of C/SB. Does c/o abdomina pain. Oriented to room and unit. Bed locked in lowest position with bed alarm on.  Call bell and all

## 2020-12-16 NOTE — ED PROVIDER NOTES
Patient Seen in: THE [facility] Emergency Department In [place]      History   Patient presents with:  Abdomen/Flank Pain    Stated Complaint: lower abd pain started this am. no vomiting/diarrhea. no cough/fever.     HPI    51-year-old male history of lung abs src 12/15/20 1732 Temporal   SpO2 12/15/20 1732 97 %   O2 Device 12/15/20 1823 None (Room air)       Current:/68   Pulse 92   Temp 100.2 °F (37.9 °C) (Oral)   Resp 16   Ht 180.3 cm (5' 11\")   Wt 79.4 kg   SpO2 97%   BMI 24.41 kg/m²         Physical 114 (*)     Sodium 135 (*)     BUN/CREA Ratio 9.8 (*)     Bilirubin, Total 2.5 (*)     All other components within normal limits   CBC W/ DIFFERENTIAL - Abnormal; Notable for the following components:    WBC 17.8 (*)     Neutrophil Absolute Prelim 15.74 Soliz Ghazala No lesion, fluid collection, ductal dilatation, or atrophy. SPLEEN:  No enlargement or focal lesion. KIDNEYS:  No mass, obstruction, or calcification. ADRENALS:  No mass or enlargement. AORTA/VASCULAR:  No aneurysm or dissection.   RETROPERITONEUM:  No Levaquin and Flagyl given, Covid negative.   Admission disposition: 12/15/2020  8:21 PM                              Disposition and Plan     Clinical Impression:  Acute diverticulitis  (primary encounter diagnosis)    Disposition:  Admit  12/15/2020  8:21

## 2020-12-16 NOTE — CONSULTS
[facility]  Report of  Surgical Consultation with History and Physical Exam    Alonzo Jerome Patient Status:  Inpatient    1970 MRN [id number]   [facility] 8NE-A Attending Nadira Miranda MD   Hosp Day # 1 PCP TERRANCE Mercado Extrinsic asthma, unspecified      mainly issue during allergy season, well controlled     Past Surgical History:   Procedure Laterality Date   • OTHER SURGICAL HISTORY  2009    lung abscess removal     Family History   Problem Relation Age of Onset   • Mu increased activity, polydipsia and polyphagia  ENMT:  Negative for ear drainage, hearing loss and nasal drainage  Eyes:  Negative for eye discharge and vision loss  Gastrointestinal: Positive for abdominal pain.  Negative for  constipation, decreased appeti 12/15/20  1741 12/16/20  0535   * 115*   BUN 11 9   CREATSERUM 1.12 1.28   GFRAA 88 75   GFRNAA 76 65   CA 8.9 8.2*   ALB 3.7  --    * 137   K 3.9 3.7    104   CO2 24.0 28.0   ALKPHO 77  --    AST 23  --    ALT 34  --    BILT 2.5*  -- this acute episode of abdominal pain the patient has had lower back pain which was unexplained.   When the patient's abdominal pain started 24 hours ago, the back pain  resolved  Work-up in the ED revealed leukocytosis of 17.8, CT scan of the abdomen and pe The patient verbalizes understanding. All questions from the patient were discussed in detail to the patient's satisfaction. No other questions were presented at this time.   Incidental abnormalities found on serology or imaging will be addressed by the p

## 2020-12-16 NOTE — PAYOR COMM NOTE
--------------  ADMISSION REVIEW     Payor: [facility]  Subscriber #:  [id number]  Authorization Number: [id number]    Admit date: 12/15/20  Admit time: 2207       Admitting Physician: Carolina Astudillo DO  Attending Physician:  Mihir Mijares MD  Primary Ca Alcohol/week: 6.0 - 24.0 standard drinks      Types: 6 - 24 Cans of beer per week      Frequency: 4 or more times a week      Drinks per session: 3 or 4      Binge frequency: Weekly    Drug use:  No             Review of Systems    Positive for stated c Musculoskeletal: Normal range of motion. General: No deformity. Right lower leg: No edema. Left lower leg: No edema. Skin:     General: Skin is warm and dry. Capillary Refill: Capillary refill takes less than 2 seconds.    Neurologi PROCEDURE:  CT ABDOMEN+PELVIS (CONTRAST ONLY) (CPT=74177)  COMPARISON:  None. INDICATIONS:  lower abd pain started this am. no vomiting/diarrhea. no cough/fever.   TECHNIQUE:  CT scanning was performed from the dome of the diaphragm to the pubic symphysis CONCLUSION:  Acute diverticulitis of the sigmoid colon involving about 8-9 cm segment. There is 1 pixel of gas within the adjacent mesentery which therefore may indicate trivial micro localized perforation. No drainable abscess.   There is marked inflamma Related Notes: Original Note by Deni Garcia MD (Physician) filed at 12/15/2020 11:00 PM           [facility]  History and Physical     Catha Ayush Patient Status:  Inpatient    1970 MRN [id number]   [facility] 8NE-A At •  Montelukast Sodium 10 MG Oral Tab, Take 1 tablet (10 mg total) by mouth nightly.  (Patient taking differently: Take 10 mg by mouth every morning.  ), Disp: 90 tablet, Rfl: 3    •  Budesonide-Formoterol Fumarate 160-4.5 MCG/ACT Inhalation Aerosol, Inhale    CO2 24.0   ALKPHO 77   AST 23   ALT 34   BILT 2.5*   TP 7.3       Estimated Creatinine Clearance: 84 mL/min (based on SCr of 1.12 mg/dL). No results for input(s): PTP, INR in the last 168 hours.     No results for input(s): TROP, CK in the last The patient states that he woke Sunday night drenched in sweat. He states that he had 1 episode of emesis at this time. He states that he had no symptoms throughout the day on Monday.   The patient reports that he woke up Tuesday morning with abdominal pa reports that he quit smoking about 11 years ago. His smoking use included cigarettes. He has a 30.00 pack-year smoking history. He quit smokeless tobacco use about 11 years ago. His smokeless tobacco use included chew.  He reports current alcohol use of a Hema/Lymph:  Negative for easy bleeding and easy bruising  Integumentary:  Negative for pruritus and rash  Musculoskeletal:  Negative for bone/joint symptoms  Neurological:  Negative for gait disturbance  Psychiatric:  Negative for inappropriate interactio No results for input(s): PTP, INR, PTT in the last 168 hours.        Impression:  Patient Active Problem List:     Allergic rhinitis     History of lung abscess     Environmental allergies     House dust mite allergy     Animal dander allergy     Allergy t /66 (BP Location: Right arm)   Pulse 80   Temp 98.1 °F (36.7 °C) (Oral)   Resp 16   Ht 5' 11\" (1.803 m)   Wt 181 lb 14.1 oz (82.5 kg)   SpO2 92%   BMI 25.37 kg/m²   General: No acute distress.  Alert and oriented   Respiratory: Clear to auscultation 12/15/2020 2346 New Bag (none) Intravenous Clive Benitez RN      Enoxaparin Sodium (LOVENOX) 40 MG/0.4ML injection 40 mg     Date Action Dose Route User    12/15/2020 2344 Given 40 mg Subcutaneous (Left Lower Abdomen) Clive Benitez RN      f Date Action Dose Route User    12/15/2020 1743 Given 4 mg Intravenous Delores GARCIA RN      Piperacillin Sod-Tazobactam So (ZOSYN) 3.375 g in dextrose 5 % 100 mL ADD-vantage     Date Action Dose Route User    12/16/2020 0713 New Bag 3.375 g Healy Mons

## 2020-12-16 NOTE — PROGRESS NOTES
[facility]  Progress note     Kermit Kimble Patient Status:  Inpatient    1970 MRN [id number]   [facility] 8NE-A Attending Jemal Burns, [address] [facility] Day # 1 PCP Irma Burris DO     Chief Complaint: abd pain    Subjective full  · Colton: no    Plan of care discussed with patient    Keenan Lazo MD  [facility]  Internal Medicine Hospitalist  Pager [phone]

## 2020-12-16 NOTE — H&P
[facility]  History and Physical     Garopetr Johnston Patient Status:  Inpatient    1970 MRN [id number]   [facility] 8NE-A Attending Nathan Ag DO   Hosp Day # 0 PCP Jennifer Perez DO     Chief Complaint: abd pain    His lungs 2 (two) times daily. , Disp: 1 Inhaler, Rfl: 11    •  Albuterol Sulfate HFA (VENTOLIN HFA) 108 (90 Base) MCG/ACT Inhalation Aero Soln, Inhale 2 puffs into the lungs every 4 to 6 hours as needed for Wheezing or Shortness of Breath., Disp: 1 Inhaler, Rf Imaging data reviewed in Epic. ASSESSMENT / PLAN:     1. Acute diverituculitis  1. Possible small micro perf  2. NPO for now  3. IVF  4. Pain meds  5. Zosyn  6.  No diarrhea      Quality:  · DVT Prophylaxis: lovenox  · CODE status: full  · Segura: no

## 2020-12-16 NOTE — PLAN OF CARE
Abdominal pain persists. 6/10 at this time. Morphine 4 mg IVP given as ordered. Alert and oriented x4. PIV intact and D5/LR infusing at 100cc per hour. Order for clear liquid diet acknowledged. No telemetry. Respirations easy on Room air.  Urine samples sen

## 2020-12-17 PROCEDURE — 99232 SBSQ HOSP IP/OBS MODERATE 35: CPT | Performed by: SURGERY

## 2020-12-17 PROCEDURE — 99232 SBSQ HOSP IP/OBS MODERATE 35: CPT | Performed by: HOSPITALIST

## 2020-12-17 NOTE — PROGRESS NOTES
[facility]  Progress Note     [facility] Patient Status:  Inpatient    1970 MRN [id number]   [facility] 8NE-A Attending Lyndsay Hale MD   [facility] Day # 2 PCP Deborah Sethi DO     Chief Complaint: abd pain    S: Patient den last 168 hours. Imaging: Imaging data reviewed in Epic.     Medications:   • famoTIDine  20 mg Intravenous BID   • Fluticasone Furoate-Vilanterol  1 puff Inhalation Daily   • enoxaparin  40 mg Subcutaneous Nightly   • piperacillin-tazobactam  3.375

## 2020-12-17 NOTE — PLAN OF CARE
Assumed care of pt at [address]. A/ox4, rm air, SR on tele. Reports pain in abd; 4mg morphine x2 administered w/ moderate relief. Abdomen tender upon palpation. VSS. Receiving IVF & IV zosyn. NPO enforced. Discussed POC w/ pt. Will continue to monitor.     Pr diabetes  Outcome: Progressing

## 2020-12-17 NOTE — PLAN OF CARE
Complaining of abdominal pain 4/10. Medicated with 4 mg Morphine sulfate. PIVs infusing into left arm. Zosyn infusing also.  Pt states he had loose stool this am.

## 2020-12-17 NOTE — PROGRESS NOTES
[facility]  Progress Note    Bunny Juares Patient Status:  Inpatient    1970 MRN [id number]   [facility] 8NE-A Attending Shaina Mohan MD   [facility] Day # 2 PCP Avery Mayen DO     Subjective: The patient is resting in bed.  He Lovenox  5. GI prophylaxis- Pepcid    Total face to face time was 15, more than 50% of the time was spent in counseling and/or coordination of care related to above listed issue. Florinda Gonzalez PA-C  09/29/6152  1:46 PM  Patient continues to improve.   L

## 2020-12-18 PROCEDURE — 99232 SBSQ HOSP IP/OBS MODERATE 35: CPT | Performed by: HOSPITALIST

## 2020-12-18 PROCEDURE — 99232 SBSQ HOSP IP/OBS MODERATE 35: CPT | Performed by: SURGERY

## 2020-12-18 NOTE — PROGRESS NOTES
[facility]  Progress Note     Bunny Juares Patient Status:  Inpatient    1970 MRN [id number]   [facility] 8NE-A Attending Shaina Mohan MD   [facility] Day # 3 PCP Avery Mayen DO     Chief Complaint: abd pain    S: Patient fee 168 hours. Imaging: Imaging data reviewed in Epic.     Medications:   • famoTIDine  20 mg Intravenous BID   • Fluticasone Furoate-Vilanterol  1 puff Inhalation Daily   • enoxaparin  40 mg Subcutaneous Nightly   • piperacillin-tazobactam  3.375 g Int

## 2020-12-18 NOTE — PROGRESS NOTES
Pt AOx4, not on tele, on RA, denies any n/v states abdomainal pain and cramping has improved. Pain has moved from 8 to 2, denies any pain medication since 0700. D5LR still running at 100ml/hr. Clear liq diet  To be continued and tolerated.

## 2020-12-18 NOTE — PROGRESS NOTES
[facility]  Progress Note    Jean Mays Patient Status:  Inpatient    1970 MRN [id number]   [facility] 8NE-A Attending Essence Morales MD   [facility] Day # 3 PCP Ezra Blas DO     Subjective: The patient is resting in bed.  He was spent in counseling and/or coordination of care related to above listed issue. Ladonna Patel PA-C  12/18/2020  08:10 AM  Patient continues to improve. Had episodes of watery stool however C. difficile is negative. Full liquid diet today.   Maribell

## 2020-12-19 RX ORDER — DICYCLOMINE HYDROCHLORIDE 10 MG/1
10 CAPSULE ORAL 3 TIMES DAILY PRN
Status: DISCONTINUED | OUTPATIENT
Start: 2020-12-19 | End: 2020-12-20

## 2020-12-19 NOTE — PLAN OF CARE
Assumed care of pt @2330. Pt axox4. Denies pain. Pt not on tele. Lungs clear. Abdomen soft, bs +4. Pt denies abdominal pain ,cramping or nausea at this time. + gas. + bm's. Voiding without difficulty.    Plan  -Iv antibiotics  -assess for pain and medicate

## 2020-12-19 NOTE — PLAN OF CARE
Assumed care of patient at 0730. Rates abdominal pain/discomfort 3-4/10. Also experiencing intermittent abdominal cramping. Does not have much of an appetite. Tolerating full liquid diet since yesterday, however patient has only had clears this morning. adequate hydration with IV or PO as ordered and tolerated  - Evaluate effectiveness of GI medications  - Encourage mobilization and activity  - Obtain nutritional consult as needed  - Establish a toileting routine/schedule  - Consider collaborating with ph

## 2020-12-20 ENCOUNTER — PATIENT MESSAGE (OUTPATIENT)
Dept: FAMILY MEDICINE CLINIC | Facility: CLINIC | Age: 50
End: 2020-12-20

## 2020-12-20 VITALS
BODY MASS INDEX: 25.46 KG/M2 | SYSTOLIC BLOOD PRESSURE: 137 MMHG | OXYGEN SATURATION: 98 % | RESPIRATION RATE: 18 BRPM | DIASTOLIC BLOOD PRESSURE: 83 MMHG | HEIGHT: 71 IN | TEMPERATURE: 99 F | HEART RATE: 95 BPM | WEIGHT: 181.88 LBS

## 2020-12-20 PROCEDURE — 99238 HOSP IP/OBS DSCHRG MGMT 30/<: CPT | Performed by: HOSPITALIST

## 2020-12-20 RX ORDER — AMOXICILLIN AND CLAVULANATE POTASSIUM 875; 125 MG/1; MG/1
1 TABLET, FILM COATED ORAL 2 TIMES DAILY
Qty: 20 TABLET | Refills: 0 | Status: SHIPPED | OUTPATIENT
Start: 2020-12-20 | End: 2020-12-30

## 2020-12-20 RX ORDER — DICYCLOMINE HYDROCHLORIDE 10 MG/1
10 CAPSULE ORAL 3 TIMES DAILY PRN
Qty: 20 CAPSULE | Refills: 0 | Status: SHIPPED | OUTPATIENT
Start: 2020-12-20 | End: 2021-04-23

## 2020-12-20 NOTE — PLAN OF CARE
Patient alert and oriented x4. Vital signs stable. On room air. Patient denies pain, requested Tylenol and Bentyl to prevent pain, states \"I always get abdominal pain around 2am, I think because of the way I'm laying\". IV fluids infusing as ordered.  IV Z Blood Glucose as ordered  - Assess for signs and symptoms of hyperglycemia and hypoglycemia  - Administer ordered medications to maintain glucose within target range  - Assess barriers to adequate nutritional intake and initiate nutrition consult as needed

## 2020-12-20 NOTE — PLAN OF CARE
Assumed care of patient at 0730. Had night sweats again last night. Stomach cramping and abdominal discomfort better. Took PRN Bentyl and Tylenol last night.  Had a BM this morning and states stools are starting to firm up a little although he felt like he

## 2020-12-20 NOTE — PROGRESS NOTES
NURSING DISCHARGE NOTE    Received orders to discharge patient. Patient's wife at bedside. Instructions given on prescribed medications and provided educational handout on new meds. Prescription for antibiotic was e-scribed to patient's pharmacy.  Print

## 2020-12-21 NOTE — TELEPHONE ENCOUNTER
From: Bunny Juares  To: Nawaf Keating PA-C  Sent: 12/20/2020 2:08 PM CST  Subject: Non-Urgent Medical Question    Good morning. So I wanted to give an update to my message from last Tuesday.  [facility]

## 2020-12-22 ENCOUNTER — TELEPHONE (OUTPATIENT)
Dept: FAMILY MEDICINE CLINIC | Facility: CLINIC | Age: 50
End: 2020-12-22

## 2020-12-22 ENCOUNTER — PATIENT OUTREACH (OUTPATIENT)
Dept: CASE MANAGEMENT | Age: 50
End: 2020-12-22

## 2020-12-22 DIAGNOSIS — K57.92 ACUTE DIVERTICULITIS: ICD-10-CM

## 2020-12-22 DIAGNOSIS — K57.20 DIVERTICULITIS OF LARGE INTESTINE WITH PERFORATION WITHOUT BLEEDING: ICD-10-CM

## 2020-12-22 DIAGNOSIS — J45.40 MODERATE PERSISTENT ASTHMA WITHOUT COMPLICATION: ICD-10-CM

## 2020-12-22 DIAGNOSIS — Z02.9 ENCOUNTERS FOR UNSPECIFIED ADMINISTRATIVE PURPOSE: ICD-10-CM

## 2020-12-22 PROCEDURE — 1111F DSCHRG MED/CURRENT MED MERGE: CPT

## 2020-12-22 NOTE — TELEPHONE ENCOUNTER
Sarbjit Galvan from [facility] is calling to speak with [facility] regarding [place] last follow up appt and his next appt, his BMI and his Gaps of care, Please call Sarbjit Galvan at [phone]

## 2020-12-22 NOTE — PROGRESS NOTES
Initial Post Discharge Follow Up   Discharge Date: 12/20/20  Contact Date: 12/22/2020    Consent Verification:  Assessment Completed With: Patient  HIPAA Verified?   Yes    Discharge Dx:     Acute Diverticulitis    Was TCC ordered: yes, patient declined discharge instructions? No  • Before leaving the hospital was your diagnoses explained to you? Yes  • Do you have any questions about your diagnoses?  No  • Are you able to perform normal daily activities of living as you have prior to your hospital stay ( concerns with constipation? No    Referrals/orders at D/C:  Home Health/Services ordered at D/C? No    DME ordered at D/C? No    Needs post D/C:   Now that you are home, are there any needs or concerns you need addressed before your next visit with your PC Discharge medications reviewed/discussed/and reconciled against outpatient medications with patient, and orders reviewed and discussed. Any changes or updates to medications and or orders sent to PCP.      For patients with TCC appointments:     NCM offered

## 2020-12-23 ENCOUNTER — TELEMEDICINE (OUTPATIENT)
Dept: FAMILY MEDICINE CLINIC | Facility: CLINIC | Age: 50
End: 2020-12-23
Payer: COMMERCIAL

## 2020-12-23 DIAGNOSIS — K57.20 DIVERTICULITIS OF LARGE INTESTINE WITH PERFORATION WITHOUT BLEEDING: Primary | ICD-10-CM

## 2020-12-23 DIAGNOSIS — J45.40 MODERATE PERSISTENT ASTHMA WITHOUT COMPLICATION: ICD-10-CM

## 2020-12-23 DIAGNOSIS — D72.9 NEUTROPHILIC LEUKOCYTOSIS: ICD-10-CM

## 2020-12-23 PROCEDURE — 99496 TRANSJ CARE MGMT HIGH F2F 7D: CPT | Performed by: FAMILY MEDICINE

## 2020-12-23 NOTE — PATIENT INSTRUCTIONS
Discharge Instructions for Diverticulitis   You have been diagnosed with diverticulitis. This is a condition in which small pouches form in your colon (large intestine) and become inflamed or infected. Follow the guidelines below for home care.   As you r Call your healthcare provider immediately if you have any of the following:  · Fever of 100.4°F (38.0°C) or higher, or as directed by your healthcare provider  · Chills  · Severe cramps in the belly, most commonly the lower left side  · Tenderness in the b · Watch for changes in your bowel movements. Tell your healthcare provider if you notice any changes. · Begin an exercise program. Ask your healthcare provider how to get started.   · Get plenty of rest and sleep.   If you have diverticulitis  Treatment de

## 2020-12-23 NOTE — PROGRESS NOTES
HPI:    J Carlos Hill is a 48year old male here today for hospital follow up.    He was discharged from Inpatient hospital, [facility] to Home   Admit Date: 12/15/20  Discharge Date: 12/20/20  Hospital Discharge Diagnosis:    Diverticulitis with s Patient was admitted into the hospital for diverticulitis with perforation on 12/15/2020 was discharged on 12/20/2020. Patient's first episode of pain started 1 week ago today states that it got worse so he went to the emergency room.   Pain started in the BUN 11 7 - 18 mg/dL    Creatinine 1.12 0.70 - 1.30 mg/dL    BUN/CREA Ratio 9.8 (L) 10.0 - 20.0    Calcium, Total 8.9 8.5 - 10.1 mg/dL    Calculated Osmolality 280 275 - 295 mOsm/kg    GFR, Non- 76 >=60    GFR, -American 88 >=60 RAPID SARS-COV-2 BY PCR    Specimen: Nares; Other   Result Value Ref Range    Rapid SARS-CoV-2 by PCR Not Detected Not Detected   C. DIFFICILE(TOXIGENIC)PCR    Specimen: Stool   Result Value Ref Range    C.  Difficile Toxin B Gene Negative Negative   CBC 0.3 %    Immature Granulocyte % 0.5 %   CBC W/ DIFFERENTIAL   Result Value Ref Range    WBC 11.4 (H) 4.0 - 11.0 x10(3) uL    RBC 4.17 (L) 4.30 - 5.70 x10(6)uL    HGB 13.5 13.0 - 17.5 g/dL    HCT 40.2 39.0 - 53.0 %    .0 150.0 - 450.0 10(3)uL    MCV rhinitis, and Extrinsic asthma, unspecified. He  has a past surgical history that includes other surgical history (2009). He family history includes Lipids in his father; Musculo-skelatal Disorder in his father and mother.    He  reports that he quit able to move around without discomfort is able to press on his belly without guarding or significant pain. He appears afebrile and is able to communicate in full sentences without any difficulty.      ASSESSMENT/ PLAN:   Diagnoses and all orders for this v Diagnoses and/or Management Options: moderate  · Amount and/or Complexity of Data to Be Reviewed: moderate  · Risk of Significant Complications, Morbidity, and/or Mortality: moderate    Overall Risk:   moderate    Patient seen within 7 days from [facility]

## 2020-12-26 NOTE — DISCHARGE SUMMARY
[facility]  DISCHARGE SUMMARY     Stacia Reyes Patient Status:  Inpatient    1970 MRN [id number]   [facility] 8NE-A Attending No att. providers found   Hosp Day # 5 PCP Carlos Greenfield DO     Date of Admission: 12/15/2020 tablet  Refills: 3        CONTINUE taking these medications      Instructions Prescription details   Albuterol Sulfate  (90 Base) MCG/ACT Aers  Commonly known as: Ventolin HFA      Inhale 2 puffs into the lungs every 4 to 6 hours as needed for [facility] edema.  -----------------------------------------------------------------------------------------------  PATIENT DISCHARGE INSTRUCTIONS: See electronic chart    Callum Chavez MD    Time spent:  > 30 minutes

## 2020-12-29 ENCOUNTER — OFFICE VISIT (OUTPATIENT)
Dept: SURGERY | Facility: CLINIC | Age: 50
End: 2020-12-29
Payer: COMMERCIAL

## 2020-12-29 VITALS
WEIGHT: 181 LBS | SYSTOLIC BLOOD PRESSURE: 130 MMHG | DIASTOLIC BLOOD PRESSURE: 80 MMHG | HEIGHT: 71 IN | TEMPERATURE: 98 F | HEART RATE: 81 BPM | BODY MASS INDEX: 25.34 KG/M2

## 2020-12-29 DIAGNOSIS — K57.20 DIVERTICULITIS OF LARGE INTESTINE WITH PERFORATION WITHOUT BLEEDING: Primary | ICD-10-CM

## 2020-12-29 PROCEDURE — 3075F SYST BP GE 130 - 139MM HG: CPT | Performed by: SURGERY

## 2020-12-29 PROCEDURE — 3008F BODY MASS INDEX DOCD: CPT | Performed by: SURGERY

## 2020-12-29 PROCEDURE — 99245 OFF/OP CONSLTJ NEW/EST HI 55: CPT | Performed by: SURGERY

## 2020-12-29 PROCEDURE — 3079F DIAST BP 80-89 MM HG: CPT | Performed by: SURGERY

## 2020-12-29 NOTE — PROGRESS NOTES
Follow Up Visit Note       Active Problems      1. Diverticulitis of large intestine with perforation without bleeding          Chief Complaint   Patient presents with:  Diverticulitis: NP REF ER DIVERTIC- PT states had an attack on 12/15/20.  PT states was taking differently: Take 10 mg by mouth every morning.  ), Disp: 90 tablet, Rfl: 3  •  Budesonide-Formoterol Fumarate 160-4.5 MCG/ACT Inhalation Aerosol, Inhale 2 puffs into the lungs 2 (two) times daily. , Disp: 1 Inhaler, Rfl: 11  •  Albuterol Sulfate HFA rhythm. Pulmonary/Chest: Effort normal and breath sounds normal.   Abdominal: Soft. Bowel sounds are normal. He exhibits no distension. There is abdominal tenderness. Musculoskeletal: Normal range of motion. General: No deformity.    Neurologica laparoscopic-assisted sigmoid colon resection for acute diverticulitis complicated by microperforation    Yamil Spears will follow-up with me after colonoscopy is completed to discuss surgical scheduling    Thank you for allowing me to participate in your patient'

## 2021-01-04 ENCOUNTER — PATIENT MESSAGE (OUTPATIENT)
Dept: FAMILY MEDICINE CLINIC | Facility: CLINIC | Age: 51
End: 2021-01-04

## 2021-01-05 NOTE — TELEPHONE ENCOUNTER
From: Hao Pop  To: Hermelindo Alvarez PA-C  Sent: 1/4/2021 7:01 PM CST  Subject: Referral Request    Good morning.  I have to get a colonoscopy and I was wondering if Venkatesh Mitchell could send me a referral. She had sent me one last year but I never follow

## 2021-02-02 ENCOUNTER — PATIENT MESSAGE (OUTPATIENT)
Dept: FAMILY MEDICINE CLINIC | Facility: CLINIC | Age: 51
End: 2021-02-02

## 2021-02-02 NOTE — TELEPHONE ENCOUNTER
From: Samira Longoria  To:  Alon Mark PA-C  Sent: 2/2/2021 9:54 AM CST  Subject: Non-Urgent Medical Question    I received an email stating I need to schedule a cbc test. I was wondering since I was in the hospital at the end of December did I sti n

## 2021-02-09 ENCOUNTER — LAB ENCOUNTER (OUTPATIENT)
Dept: LAB | Age: 51
End: 2021-02-09
Attending: FAMILY MEDICINE
Payer: COMMERCIAL

## 2021-02-09 DIAGNOSIS — R73.09 ELEVATED GLUCOSE: ICD-10-CM

## 2021-02-09 DIAGNOSIS — Z00.00 LABORATORY EXAMINATION ORDERED AS PART OF A ROUTINE GENERAL MEDICAL EXAMINATION: ICD-10-CM

## 2021-02-09 DIAGNOSIS — K57.20 DIVERTICULITIS OF LARGE INTESTINE WITH PERFORATION WITHOUT BLEEDING: ICD-10-CM

## 2021-02-09 DIAGNOSIS — D72.9 NEUTROPHILIC LEUKOCYTOSIS: ICD-10-CM

## 2021-02-09 DIAGNOSIS — R73.09 ELEVATED GLUCOSE: Primary | ICD-10-CM

## 2021-02-09 LAB
ALBUMIN SERPL-MCNC: 3.4 G/DL (ref 3.4–5)
ALBUMIN/GLOB SERPL: 0.9 {RATIO} (ref 1–2)
ALP LIVER SERPL-CCNC: 98 U/L
ALT SERPL-CCNC: 38 U/L
ANION GAP SERPL CALC-SCNC: 4 MMOL/L (ref 0–18)
AST SERPL-CCNC: 25 U/L (ref 15–37)
BASOPHILS # BLD AUTO: 0.07 X10(3) UL (ref 0–0.2)
BASOPHILS NFR BLD AUTO: 0.9 %
BILIRUB SERPL-MCNC: 1.1 MG/DL (ref 0.1–2)
BUN BLD-MCNC: 12 MG/DL (ref 7–18)
BUN/CREAT SERPL: 9.5 (ref 10–20)
CALCIUM BLD-MCNC: 8.8 MG/DL (ref 8.5–10.1)
CHLORIDE SERPL-SCNC: 107 MMOL/L (ref 98–112)
CHOLEST SMN-MCNC: 190 MG/DL (ref ?–200)
CO2 SERPL-SCNC: 28 MMOL/L (ref 21–32)
COMPLEXED PSA SERPL-MCNC: 1.16 NG/ML (ref ?–4)
CREAT BLD-MCNC: 1.26 MG/DL
DEPRECATED RDW RBC AUTO: 43.4 FL (ref 35.1–46.3)
EOSINOPHIL # BLD AUTO: 0.3 X10(3) UL (ref 0–0.7)
EOSINOPHIL NFR BLD AUTO: 3.8 %
ERYTHROCYTE [DISTWIDTH] IN BLOOD BY AUTOMATED COUNT: 12.1 % (ref 11–15)
EST. AVERAGE GLUCOSE BLD GHB EST-MCNC: 103 MG/DL (ref 68–126)
GLOBULIN PLAS-MCNC: 3.7 G/DL (ref 2.8–4.4)
GLUCOSE BLD-MCNC: 106 MG/DL (ref 70–99)
HBA1C MFR BLD HPLC: 5.2 % (ref ?–5.7)
HCT VFR BLD AUTO: 47.3 %
HDLC SERPL-MCNC: 65 MG/DL (ref 40–59)
HGB BLD-MCNC: 15.2 G/DL
IMM GRANULOCYTES # BLD AUTO: 0.01 X10(3) UL (ref 0–1)
IMM GRANULOCYTES NFR BLD: 0.1 %
LDLC SERPL CALC-MCNC: 111 MG/DL (ref ?–100)
LYMPHOCYTES # BLD AUTO: 1.85 X10(3) UL (ref 1–4)
LYMPHOCYTES NFR BLD AUTO: 23.4 %
M PROTEIN MFR SERPL ELPH: 7.1 G/DL (ref 6.4–8.2)
MCH RBC QN AUTO: 31.4 PG (ref 26–34)
MCHC RBC AUTO-ENTMCNC: 32.1 G/DL (ref 31–37)
MCV RBC AUTO: 97.7 FL
MONOCYTES # BLD AUTO: 0.74 X10(3) UL (ref 0.1–1)
MONOCYTES NFR BLD AUTO: 9.4 %
NEUTROPHILS # BLD AUTO: 4.94 X10 (3) UL (ref 1.5–7.7)
NEUTROPHILS # BLD AUTO: 4.94 X10(3) UL (ref 1.5–7.7)
NEUTROPHILS NFR BLD AUTO: 62.4 %
NONHDLC SERPL-MCNC: 125 MG/DL (ref ?–130)
OSMOLALITY SERPL CALC.SUM OF ELEC: 288 MOSM/KG (ref 275–295)
PATIENT FASTING Y/N/NP: YES
PATIENT FASTING Y/N/NP: YES
PLATELET # BLD AUTO: 333 10(3)UL (ref 150–450)
POTASSIUM SERPL-SCNC: 4.2 MMOL/L (ref 3.5–5.1)
RBC # BLD AUTO: 4.84 X10(6)UL
SODIUM SERPL-SCNC: 139 MMOL/L (ref 136–145)
T4 FREE SERPL-MCNC: 0.8 NG/DL (ref 0.8–1.7)
TRIGL SERPL-MCNC: 69 MG/DL (ref 30–149)
TSI SER-ACNC: 2.58 MIU/ML (ref 0.36–3.74)
VLDLC SERPL CALC-MCNC: 14 MG/DL (ref 0–30)
WBC # BLD AUTO: 7.9 X10(3) UL (ref 4–11)

## 2021-02-09 PROCEDURE — 83036 HEMOGLOBIN GLYCOSYLATED A1C: CPT | Performed by: FAMILY MEDICINE

## 2021-02-09 PROCEDURE — 84153 ASSAY OF PSA TOTAL: CPT | Performed by: FAMILY MEDICINE

## 2021-02-09 PROCEDURE — 80050 GENERAL HEALTH PANEL: CPT | Performed by: FAMILY MEDICINE

## 2021-02-09 PROCEDURE — 84439 ASSAY OF FREE THYROXINE: CPT | Performed by: FAMILY MEDICINE

## 2021-02-09 PROCEDURE — 36415 COLL VENOUS BLD VENIPUNCTURE: CPT | Performed by: FAMILY MEDICINE

## 2021-02-09 PROCEDURE — 80061 LIPID PANEL: CPT | Performed by: FAMILY MEDICINE

## 2021-02-09 NOTE — PROGRESS NOTES
Add hemoglobin A1c blood sugar 106. Normal liver and kidney function. Lipids improved LDL now at 111, good cardioprotective HDL.   Complete blood count, thyroid and prostate blood test are all normal.  Order future tests/medications sent to my chart

## 2021-04-07 ENCOUNTER — PATIENT MESSAGE (OUTPATIENT)
Dept: FAMILY MEDICINE CLINIC | Facility: CLINIC | Age: 51
End: 2021-04-07

## 2021-04-07 RX ORDER — FLUTICASONE PROPIONATE AND SALMETEROL 250; 50 UG/1; UG/1
1 POWDER RESPIRATORY (INHALATION) EVERY 12 HOURS SCHEDULED
Qty: 1 EACH | Refills: 5 | Status: SHIPPED | OUTPATIENT
Start: 2021-04-07 | End: 2021-08-20

## 2021-04-07 NOTE — TELEPHONE ENCOUNTER
From: Irma Bravo  To: Tonia Ny PA-C  Sent: 4/7/2021 4:32 PM CDT  Subject: Prescription Question    Hi. I called in a new prescription for Symbicort and they want $380 for it with my insurance.  I think I can knock another $100 off with a coupo

## 2021-04-07 NOTE — TELEPHONE ENCOUNTER
Try generic Advair Diskus 250/50 mcg 1 puff twice a day rinse mouth after use on good Rx the price is $108 at [facility] maybe this 1 will be covered by insurance as it is now generic.   Note sent to my chart and prescription sent to [facility]

## 2021-04-08 ENCOUNTER — PATIENT MESSAGE (OUTPATIENT)
Dept: FAMILY MEDICINE CLINIC | Facility: CLINIC | Age: 51
End: 2021-04-08

## 2021-04-08 NOTE — TELEPHONE ENCOUNTER
From: Анна Hollis  To: Pooja Arriaga PA-C  Sent: 4/8/2021 10:09 AM CDT  Subject: Prescription Question    Thanks for the new prescription. I'll see you later this month.   Reji Mcneil

## 2021-04-23 ENCOUNTER — OFFICE VISIT (OUTPATIENT)
Dept: FAMILY MEDICINE CLINIC | Facility: CLINIC | Age: 51
End: 2021-04-23
Payer: COMMERCIAL

## 2021-04-23 VITALS
RESPIRATION RATE: 16 BRPM | HEIGHT: 71.3 IN | DIASTOLIC BLOOD PRESSURE: 76 MMHG | OXYGEN SATURATION: 97 % | HEART RATE: 68 BPM | WEIGHT: 183 LBS | BODY MASS INDEX: 25.34 KG/M2 | SYSTOLIC BLOOD PRESSURE: 126 MMHG | TEMPERATURE: 97 F

## 2021-04-23 DIAGNOSIS — Z87.891 HISTORY OF SMOKING 30 OR MORE PACK YEARS: ICD-10-CM

## 2021-04-23 DIAGNOSIS — J30.81 ANIMAL DANDER ALLERGY: ICD-10-CM

## 2021-04-23 DIAGNOSIS — J45.40 MODERATE PERSISTENT ASTHMA WITHOUT COMPLICATION: Primary | ICD-10-CM

## 2021-04-23 DIAGNOSIS — R93.89 ABNORMAL CT OF THE CHEST: ICD-10-CM

## 2021-04-23 DIAGNOSIS — Z12.11 COLON CANCER SCREENING: ICD-10-CM

## 2021-04-23 DIAGNOSIS — J30.1 SEASONAL ALLERGIC RHINITIS DUE TO POLLEN: ICD-10-CM

## 2021-04-23 DIAGNOSIS — Z87.19 HISTORY OF COLONIC DIVERTICULITIS: ICD-10-CM

## 2021-04-23 DIAGNOSIS — K57.90 DIVERTICULOSIS: ICD-10-CM

## 2021-04-23 PROCEDURE — 3078F DIAST BP <80 MM HG: CPT | Performed by: FAMILY MEDICINE

## 2021-04-23 PROCEDURE — 99214 OFFICE O/P EST MOD 30 MIN: CPT | Performed by: FAMILY MEDICINE

## 2021-04-23 PROCEDURE — 3008F BODY MASS INDEX DOCD: CPT | Performed by: FAMILY MEDICINE

## 2021-04-23 PROCEDURE — 3074F SYST BP LT 130 MM HG: CPT | Performed by: FAMILY MEDICINE

## 2021-04-24 ENCOUNTER — PATIENT MESSAGE (OUTPATIENT)
Dept: FAMILY MEDICINE CLINIC | Facility: CLINIC | Age: 51
End: 2021-04-24

## 2021-04-24 PROBLEM — Z87.891 HISTORY OF SMOKING 30 OR MORE PACK YEARS: Status: ACTIVE | Noted: 2021-04-24

## 2021-04-24 PROBLEM — K57.92 ACUTE DIVERTICULITIS: Status: RESOLVED | Noted: 2020-12-15 | Resolved: 2021-04-24

## 2021-04-24 PROBLEM — R93.89 ABNORMAL CT OF THE CHEST: Status: ACTIVE | Noted: 2021-04-24

## 2021-04-24 PROBLEM — Z87.19 HISTORY OF COLONIC DIVERTICULITIS: Status: ACTIVE | Noted: 2021-04-24

## 2021-04-24 PROBLEM — K57.90 DIVERTICULOSIS: Status: ACTIVE | Noted: 2021-04-24

## 2021-04-24 NOTE — PROGRESS NOTES
Irma Bravo is a 48year old male.   HPI:     Colon cancer screening  Diverticulosis  History of colonic diverticulitis  Patient was scheduled for an endoscopy and a colonoscopy 2/22/2021 had diverticulitis December 2020 recovered nicely but was told to he has been experiencing in the past were related to asthma and severe allergies. Current Outpatient Medications   Medication Sig Dispense Refill   • Psyllium (METAMUCIL FIBER OR) Take 5 capsules by mouth daily.      • fluticasone-salmeterol 250-50 exertion  GI: denies abdominal pain and denies heartburn  NEURO: denies headaches  Musculoskeletal: No motor deficits  EXAM:   /76 (BP Location: Left arm, Patient Position: Sitting, Cuff Size: adult)   Pulse 68   Temp 97.4 °F (36.3 °C) (Skin)   Resp years    History of abnormal CT chest reactive/inflammatory lymph nodes was advised to repeat 12 months later patient did not do this CT scan order replaced and patient sent information  - CT CHEST (CPT=71250);  Future  Lung cancer screening:  Who Should Be

## 2021-04-26 NOTE — TELEPHONE ENCOUNTER
From: Lan Eli PA-C  To: Christine Martinez  Sent: 4/24/2021 8:59 AM CDT  Subject: CT chest    Gustavo Muse   CT lung screening suggestion. Who Should Be Screened?  CDC recommendation sent to my chart for patient  The [place] Preventive Services Task For

## 2021-05-03 ENCOUNTER — PATIENT MESSAGE (OUTPATIENT)
Dept: FAMILY MEDICINE CLINIC | Facility: CLINIC | Age: 51
End: 2021-05-03

## 2021-05-21 ENCOUNTER — LAB ENCOUNTER (OUTPATIENT)
Dept: LAB | Age: 51
End: 2021-05-21
Attending: INTERNAL MEDICINE
Payer: COMMERCIAL

## 2021-05-21 DIAGNOSIS — Z01.818 PRE-OP TESTING: ICD-10-CM

## 2021-05-24 PROBLEM — Z12.11 SPECIAL SCREENING FOR MALIGNANT NEOPLASM OF COLON: Status: ACTIVE | Noted: 2021-05-24

## 2021-05-27 ENCOUNTER — TELEPHONE (OUTPATIENT)
Dept: FAMILY MEDICINE CLINIC | Facility: CLINIC | Age: 51
End: 2021-05-27

## 2021-05-27 DIAGNOSIS — J21.9 BRONCHIOLITIS: Primary | ICD-10-CM

## 2021-05-29 ENCOUNTER — PATIENT MESSAGE (OUTPATIENT)
Dept: FAMILY MEDICINE CLINIC | Facility: CLINIC | Age: 51
End: 2021-05-29

## 2021-06-01 NOTE — TELEPHONE ENCOUNTER
From: Alonzo Jerome  To: Sil Palencia PA-C  Sent: 5/29/2021 2:00 PM CDT  Subject: Other    Just wanted to let you know I have got my covid vaccine on 5.29.21 @ [facility] [address]. I did the J&J one shot.     Sukumar Chen

## 2021-06-04 NOTE — TELEPHONE ENCOUNTER
The patient's CT Chest from 05/19/2021 was successfully faxed to Dr. Vaibhav Javed for review for future consultation.

## 2021-08-20 ENCOUNTER — PATIENT MESSAGE (OUTPATIENT)
Dept: FAMILY MEDICINE CLINIC | Facility: CLINIC | Age: 51
End: 2021-08-20

## 2021-08-20 ENCOUNTER — OFFICE VISIT (OUTPATIENT)
Dept: FAMILY MEDICINE CLINIC | Facility: CLINIC | Age: 51
End: 2021-08-20
Payer: COMMERCIAL

## 2021-08-20 VITALS
HEART RATE: 68 BPM | WEIGHT: 181 LBS | DIASTOLIC BLOOD PRESSURE: 82 MMHG | TEMPERATURE: 98 F | SYSTOLIC BLOOD PRESSURE: 140 MMHG | BODY MASS INDEX: 25 KG/M2

## 2021-08-20 DIAGNOSIS — Z00.00 WELL ADULT EXAM: Primary | ICD-10-CM

## 2021-08-20 DIAGNOSIS — L98.8 SKIN MACULE: ICD-10-CM

## 2021-08-20 DIAGNOSIS — R03.0 ELEVATED BP WITHOUT DIAGNOSIS OF HYPERTENSION: ICD-10-CM

## 2021-08-20 DIAGNOSIS — Z00.00 LABORATORY EXAMINATION ORDERED AS PART OF A ROUTINE GENERAL MEDICAL EXAMINATION: ICD-10-CM

## 2021-08-20 DIAGNOSIS — J45.40 MODERATE PERSISTENT ASTHMA WITHOUT COMPLICATION: ICD-10-CM

## 2021-08-20 PROCEDURE — 3079F DIAST BP 80-89 MM HG: CPT | Performed by: FAMILY MEDICINE

## 2021-08-20 PROCEDURE — 99396 PREV VISIT EST AGE 40-64: CPT | Performed by: FAMILY MEDICINE

## 2021-08-20 PROCEDURE — 3077F SYST BP >= 140 MM HG: CPT | Performed by: FAMILY MEDICINE

## 2021-08-20 PROCEDURE — 99212 OFFICE O/P EST SF 10 MIN: CPT | Performed by: FAMILY MEDICINE

## 2021-08-20 RX ORDER — FLUTICASONE PROPIONATE AND SALMETEROL 250; 50 UG/1; UG/1
1 POWDER RESPIRATORY (INHALATION) EVERY 12 HOURS SCHEDULED
Qty: 1 EACH | Refills: 5 | Status: SHIPPED | OUTPATIENT
Start: 2021-08-20

## 2021-08-20 NOTE — PATIENT INSTRUCTIONS
Routine Healthcare for Men  Routine checkups can find treatable problems early. For many medical problems, early treatment can help prevent more serious complications. The value of checkups and how often you have them depend mainly on your age.  Your person controversial. Many studies have been done, but they do not yet show that it is practical to do it on all men at their checkups. The test often gives misleading results and can cause undue anxiety, expense, and unnecessary medical procedures.  You should di whooping cough (pertussis) as well as tetanus. If you are 72 or older, this new vaccine has not yet been approved for your age group.  Because babies are most susceptible to complications from whooping cough, Tdap is especially recommended for adults caring vegetables in your diet. Get regular physical activity or exercise. Injury prevention: Use lap and shoulder belts when you drive. Use a helmet when you ride a motorcycle or bicycle.  If you are around guns or other firearms, practice safe handling and warren

## 2021-08-20 NOTE — PROGRESS NOTES
Reina Smith is a 46year old male who presents for a complete physical exam.   HPI:   CPE    Right forearm 2 days ago noticed 2 spots not itching or painful. No bleeding no insect bite that he is aware of.     Refill ADvair  Quit smoking 2009    COVID MCHC      31.0 - 37.0 g/dL  32.1   RDW      11.0 - 15.0 %  12.1   RDW-SD      35.1 - 46.3 fL  43.4   Prelim Neutrophil Abs      1.50 - 7.70 x10 (3) uL  4.94   Neutrophils Absolute      1.50 - 7.70 x10(3) uL  4.94   Lymphocytes Absolute      1.00 - 4.00 x Screen      <=4.00 ng/mL  1.16       Wt Readings from Last 6 Encounters:  08/20/21 : 181 lb (82.1 kg)  04/23/21 : 183 lb (83 kg)  02/22/21 : 185 lb (83.9 kg)  12/29/20 : 181 lb (82.1 kg)  12/15/20 : 181 lb 14.1 oz (82.5 kg)  08/19/20 : 180 lb (81.6 kg) Former User        Types: Chew        Quit date: 1/1/2009    Vaping Use      Vaping Use: Never used    Alcohol use:  Yes      Alcohol/week: 6.0 - 24.0 standard drinks      Types: 6 - 24 Cans of beer per week    Drug use: No     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 Aerobic exercise 30 minutes five days a week for cardiovascular fitness and 45-60 minutes 6-7 days a week for weight loss. Advised testicular self exam once a month.   Shingles vaccine  UF

## 2021-08-23 NOTE — TELEPHONE ENCOUNTER
From: Sari Johnston  To: Anabelle Duque PA-C  Sent: 8/20/2021 7:02 PM CDT  Subject: Non-Urgent Medical Question    Hi. So I quit mountain dew but now I drink Gatorade in its place. Could that be the reason for high blood pressure?  Just trying to figur

## 2021-08-26 ENCOUNTER — TELEPHONE (OUTPATIENT)
Dept: FAMILY MEDICINE CLINIC | Facility: CLINIC | Age: 51
End: 2021-08-26

## 2021-08-26 NOTE — TELEPHONE ENCOUNTER
Simran Lopes from [facility] called to inform our office that they never recevied CD they requested in 05/2021 from [facility] so a comparison was never done.   Simran Lopes was informed that Sudha Vallejo would like a comparison to be made to the Tanna Loja

## 2021-09-05 ENCOUNTER — PATIENT MESSAGE (OUTPATIENT)
Dept: FAMILY MEDICINE CLINIC | Facility: CLINIC | Age: 51
End: 2021-09-05

## 2021-09-07 ENCOUNTER — PATIENT MESSAGE (OUTPATIENT)
Dept: FAMILY MEDICINE CLINIC | Facility: CLINIC | Age: 51
End: 2021-09-07

## 2021-09-07 NOTE — TELEPHONE ENCOUNTER
From: Gissell Person  To: Florence Hitchcock PA-C  Sent: 9/5/2021 10:10 PM CDT  Subject: Non-Urgent Medical Question    Hope you all had a good weekend! I just want to let you know I got my flu shot and 1st dose of shingles as well today.     Shanita Fuentes

## 2021-09-07 NOTE — TELEPHONE ENCOUNTER
From: Santa Carey  To: Alla Brian PA-C  Sent: 9/7/2021 9:53 AM CDT  Subject: Non-Urgent Medical Question    Hi again. So I got my flu shot and 1st dose of shingles on Sunday afternoon and since Sunday night I have felt really sick.  Is that darrius

## 2021-09-08 ENCOUNTER — PATIENT MESSAGE (OUTPATIENT)
Dept: FAMILY MEDICINE CLINIC | Facility: CLINIC | Age: 51
End: 2021-09-08

## 2021-09-08 DIAGNOSIS — R23.3 ECCHYMOSES, SPONTANEOUS: Primary | ICD-10-CM

## 2021-09-08 NOTE — TELEPHONE ENCOUNTER
From: Naida Knows  To: Katy Xavier PA-C  Sent: 9/8/2021 2:40 PM CDT  Subject: Non-Urgent Medical Question    Hi Gilda. This just appeared yesterday on my left arm. Previously they were on the right arm.  Again no pain or itching just dinaa show

## 2021-09-17 ENCOUNTER — OFFICE VISIT (OUTPATIENT)
Dept: FAMILY MEDICINE CLINIC | Facility: CLINIC | Age: 51
End: 2021-09-17
Payer: COMMERCIAL

## 2021-09-17 ENCOUNTER — PATIENT MESSAGE (OUTPATIENT)
Dept: FAMILY MEDICINE CLINIC | Facility: CLINIC | Age: 51
End: 2021-09-17

## 2021-09-17 ENCOUNTER — LAB ENCOUNTER (OUTPATIENT)
Dept: LAB | Age: 51
End: 2021-09-17
Attending: FAMILY MEDICINE
Payer: COMMERCIAL

## 2021-09-17 VITALS
DIASTOLIC BLOOD PRESSURE: 80 MMHG | WEIGHT: 180 LBS | HEART RATE: 72 BPM | SYSTOLIC BLOOD PRESSURE: 128 MMHG | HEIGHT: 71.3 IN | TEMPERATURE: 98 F | BODY MASS INDEX: 24.92 KG/M2

## 2021-09-17 DIAGNOSIS — L98.9 ARM SKIN LESION, LEFT: Primary | ICD-10-CM

## 2021-09-17 DIAGNOSIS — J45.40 MODERATE PERSISTENT ASTHMA WITHOUT COMPLICATION: ICD-10-CM

## 2021-09-17 DIAGNOSIS — R23.3 ECCHYMOSES, SPONTANEOUS: ICD-10-CM

## 2021-09-17 DIAGNOSIS — L98.9 ARM SKIN LESION, LEFT: ICD-10-CM

## 2021-09-17 LAB
APTT PPP: 35.3 SECONDS (ref 25.4–36.1)
BASOPHILS # BLD AUTO: 0.07 X10(3) UL (ref 0–0.2)
BASOPHILS NFR BLD AUTO: 1.2 %
CRP SERPL-MCNC: <0.29 MG/DL (ref ?–0.3)
EOSINOPHIL # BLD AUTO: 0.22 X10(3) UL (ref 0–0.7)
EOSINOPHIL NFR BLD AUTO: 3.6 %
ERYTHROCYTE [DISTWIDTH] IN BLOOD BY AUTOMATED COUNT: 12 %
HCT VFR BLD AUTO: 48.1 %
HGB BLD-MCNC: 15.9 G/DL
IMM GRANULOCYTES # BLD AUTO: 0.02 X10(3) UL (ref 0–1)
IMM GRANULOCYTES NFR BLD: 0.3 %
INR BLD: 1.04 (ref 0.89–1.11)
LYMPHOCYTES # BLD AUTO: 1.8 X10(3) UL (ref 1–4)
LYMPHOCYTES NFR BLD AUTO: 29.7 %
MCH RBC QN AUTO: 32.4 PG (ref 26–34)
MCHC RBC AUTO-ENTMCNC: 33.1 G/DL (ref 31–37)
MCV RBC AUTO: 98 FL
MONOCYTES # BLD AUTO: 0.57 X10(3) UL (ref 0.1–1)
MONOCYTES NFR BLD AUTO: 9.4 %
NEUTROPHILS # BLD AUTO: 3.39 X10 (3) UL (ref 1.5–7.7)
NEUTROPHILS # BLD AUTO: 3.39 X10(3) UL (ref 1.5–7.7)
NEUTROPHILS NFR BLD AUTO: 55.8 %
PLATELET # BLD AUTO: 297 10(3)UL (ref 150–450)
PROTHROMBIN TIME: 13.8 SECONDS (ref 12.2–14.5)
RBC # BLD AUTO: 4.91 X10(6)UL
WBC # BLD AUTO: 6.1 X10(3) UL (ref 4–11)

## 2021-09-17 PROCEDURE — 85025 COMPLETE CBC W/AUTO DIFF WBC: CPT | Performed by: FAMILY MEDICINE

## 2021-09-17 PROCEDURE — 99213 OFFICE O/P EST LOW 20 MIN: CPT | Performed by: FAMILY MEDICINE

## 2021-09-17 PROCEDURE — 3079F DIAST BP 80-89 MM HG: CPT | Performed by: FAMILY MEDICINE

## 2021-09-17 PROCEDURE — 85610 PROTHROMBIN TIME: CPT | Performed by: FAMILY MEDICINE

## 2021-09-17 PROCEDURE — 85730 THROMBOPLASTIN TIME PARTIAL: CPT | Performed by: FAMILY MEDICINE

## 2021-09-17 PROCEDURE — 3008F BODY MASS INDEX DOCD: CPT | Performed by: FAMILY MEDICINE

## 2021-09-17 PROCEDURE — 86140 C-REACTIVE PROTEIN: CPT | Performed by: FAMILY MEDICINE

## 2021-09-17 PROCEDURE — 3074F SYST BP LT 130 MM HG: CPT | Performed by: FAMILY MEDICINE

## 2021-09-17 RX ORDER — BIOTIN 1 MG
1 TABLET ORAL DAILY
COMMUNITY

## 2021-09-17 NOTE — PROGRESS NOTES
Doy Kocher is a 46year old male. HPI:   Patient is in for follow-up on asthma and a elevated blood pressure. Last visit elevated blood pressure since then has not checked blood pressure at home.   States no cardiac symptoms including no chest pa Diverticulitis 12/15/2020   • Extrinsic asthma, unspecified      mainly issue during allergy season, well controlled   • Night sweats 09/29/20      Social History:  Social History    Tobacco Use      Smoking status: Former Smoker        Packs/day: 1.50 diagnosis)  Moderate persistent asthma without complication    Orders Placed This Encounter      C-Reactive Protein [E]      Meds & Refills for this Visit:  Requested Prescriptions      No prescriptions requested or ordered in this encounter       Imaging

## 2021-09-18 NOTE — PROGRESS NOTES
All lab tests are normal.  Normal clotting factors, normal complete blood count with platelet count. C-reactive protein inflammatory marker also normal.  Follow-up with dermatology to discuss rash.

## 2021-09-20 NOTE — TELEPHONE ENCOUNTER
From: Divya Jimenez  To: Alla Anderson PA-C  Sent: 9/17/2021 10:04 PM CDT  Subject: Question regarding PROTHROMBIN TIME (PT)    That was quick!  Thanks Polina Rothman!!!    Valentin Banerjee

## 2021-10-22 RX ORDER — FLUTICASONE PROPIONATE AND SALMETEROL 250; 50 UG/1; UG/1
1 POWDER RESPIRATORY (INHALATION) EVERY 12 HOURS SCHEDULED
Qty: 1 EACH | Refills: 5 | OUTPATIENT
Start: 2021-10-22

## 2021-10-22 NOTE — TELEPHONE ENCOUNTER
Requested Prescriptions     Refused Prescriptions Disp Refills   • fluticasone-salmeterol 250-50 MCG/DOSE Inhalation Aerosol Powder, Breath Activated 1 each 5     Sig: Inhale 1 puff into the lungs every 12 (twelve) hours.  Rinse mouth after use     Refused

## 2021-11-07 ENCOUNTER — PATIENT MESSAGE (OUTPATIENT)
Dept: FAMILY MEDICINE CLINIC | Facility: CLINIC | Age: 51
End: 2021-11-07

## 2021-11-08 NOTE — TELEPHONE ENCOUNTER
From: Rogelio Fitzpatrick  To: Lana Perez PA-C  Sent: 11/7/2021 5:37 PM CST  Subject: J&J Booster    Good morning. Does your office offer the J&J Booster shot?      Thanks,  [employer]

## 2021-11-21 ENCOUNTER — PATIENT MESSAGE (OUTPATIENT)
Dept: FAMILY MEDICINE CLINIC | Facility: CLINIC | Age: 51
End: 2021-11-21

## 2021-11-22 NOTE — TELEPHONE ENCOUNTER
From: Roberto Lion  To: Good Austin PA-C  Sent: 11/21/2021 12:31 PM CST  Subject: Booster shot    Good morning. I got my J&J booster shot today from [facility].     Donta Muse

## 2021-11-24 ENCOUNTER — PATIENT MESSAGE (OUTPATIENT)
Dept: FAMILY MEDICINE CLINIC | Facility: CLINIC | Age: 51
End: 2021-11-24

## 2021-11-24 DIAGNOSIS — J45.40 MODERATE PERSISTENT ASTHMA WITHOUT COMPLICATION: ICD-10-CM

## 2021-11-26 ENCOUNTER — PATIENT MESSAGE (OUTPATIENT)
Dept: FAMILY MEDICINE CLINIC | Facility: CLINIC | Age: 51
End: 2021-11-26

## 2021-11-26 RX ORDER — MONTELUKAST SODIUM 10 MG/1
10 TABLET ORAL NIGHTLY
Qty: 90 TABLET | Refills: 0 | Status: SHIPPED | OUTPATIENT
Start: 2021-11-26

## 2021-11-26 NOTE — TELEPHONE ENCOUNTER
Requested Prescriptions     Signed Prescriptions Disp Refills   • montelukast 10 MG Oral Tab 90 tablet 0     Sig: Take 1 tablet (10 mg total) by mouth nightly.      Authorizing Provider: Susan Quesada     Ordering User: Naya Adam OV   Asthm

## 2021-11-26 NOTE — TELEPHONE ENCOUNTER
From: Vilma Linton  To: John Kelley PA-C  Sent: 11/24/2021 6:11 PM CST  Subject: Rx    Can you refill my Rx for montelukast please? I didn't realize I was out of refills.     Hesham Ridley

## 2021-12-05 ENCOUNTER — PATIENT MESSAGE (OUTPATIENT)
Dept: FAMILY MEDICINE CLINIC | Facility: CLINIC | Age: 51
End: 2021-12-05

## 2021-12-06 NOTE — TELEPHONE ENCOUNTER
From: Christine Martinez  To: Lan Eli PA-C  Sent: 12/5/2021 2:10 PM CST  Subject: Shingles shot    Good morning. I just got my 2nd shingles shot from [facility].     Xochitl Reyes

## 2022-02-12 ENCOUNTER — PATIENT MESSAGE (OUTPATIENT)
Dept: FAMILY MEDICINE CLINIC | Facility: CLINIC | Age: 52
End: 2022-02-12

## 2022-02-14 NOTE — TELEPHONE ENCOUNTER
From: Gwen Worrell  To: Alton Powers PA-C  Sent: 2/12/2022 4:06 PM CST  Subject: Polly Guzman my last email.  I just received the instructions via snail mail today :)    Mattie Bales

## 2022-02-17 ENCOUNTER — LABORATORY ENCOUNTER (OUTPATIENT)
Dept: LAB | Age: 52
End: 2022-02-17
Attending: FAMILY MEDICINE
Payer: COMMERCIAL

## 2022-02-17 ENCOUNTER — PATIENT MESSAGE (OUTPATIENT)
Dept: FAMILY MEDICINE CLINIC | Facility: CLINIC | Age: 52
End: 2022-02-17

## 2022-02-17 DIAGNOSIS — Z00.00 LABORATORY EXAMINATION ORDERED AS PART OF A ROUTINE GENERAL MEDICAL EXAMINATION: ICD-10-CM

## 2022-02-17 LAB
ALBUMIN SERPL-MCNC: 3.7 G/DL (ref 3.4–5)
ALBUMIN/GLOB SERPL: 1.1 {RATIO} (ref 1–2)
ALP LIVER SERPL-CCNC: 73 U/L
ALT SERPL-CCNC: 41 U/L
ANION GAP SERPL CALC-SCNC: 5 MMOL/L (ref 0–18)
AST SERPL-CCNC: 31 U/L (ref 15–37)
BASOPHILS # BLD AUTO: 0.06 X10(3) UL (ref 0–0.2)
BASOPHILS NFR BLD AUTO: 1 %
BILIRUB SERPL-MCNC: 1 MG/DL (ref 0.1–2)
BUN BLD-MCNC: 9 MG/DL (ref 7–18)
CALCIUM BLD-MCNC: 9 MG/DL (ref 8.5–10.1)
CHLORIDE SERPL-SCNC: 108 MMOL/L (ref 98–112)
CHOLEST SERPL-MCNC: 170 MG/DL (ref ?–200)
CO2 SERPL-SCNC: 28 MMOL/L (ref 21–32)
COMPLEXED PSA SERPL-MCNC: 1.44 NG/ML (ref ?–4)
CREAT BLD-MCNC: 1.01 MG/DL
EOSINOPHIL # BLD AUTO: 0.32 X10(3) UL (ref 0–0.7)
EOSINOPHIL NFR BLD AUTO: 5.6 %
ERYTHROCYTE [DISTWIDTH] IN BLOOD BY AUTOMATED COUNT: 12 %
FASTING PATIENT LIPID ANSWER: YES
FASTING STATUS PATIENT QL REPORTED: YES
GLOBULIN PLAS-MCNC: 3.4 G/DL (ref 2.8–4.4)
HCT VFR BLD AUTO: 47.1 %
HDLC SERPL-MCNC: 70 MG/DL (ref 40–59)
HGB BLD-MCNC: 15.5 G/DL
IMM GRANULOCYTES # BLD AUTO: 0.02 X10(3) UL (ref 0–1)
IMM GRANULOCYTES NFR BLD: 0.3 %
LDLC SERPL CALC-MCNC: 80 MG/DL (ref ?–100)
LYMPHOCYTES # BLD AUTO: 1.97 X10(3) UL (ref 1–4)
LYMPHOCYTES NFR BLD AUTO: 34.4 %
MCH RBC QN AUTO: 32 PG (ref 26–34)
MCHC RBC AUTO-ENTMCNC: 32.9 G/DL (ref 31–37)
MCV RBC AUTO: 97.1 FL
MONOCYTES # BLD AUTO: 0.57 X10(3) UL (ref 0.1–1)
MONOCYTES NFR BLD AUTO: 9.9 %
NEUTROPHILS # BLD AUTO: 2.79 X10 (3) UL (ref 1.5–7.7)
NEUTROPHILS # BLD AUTO: 2.79 X10(3) UL (ref 1.5–7.7)
NEUTROPHILS NFR BLD AUTO: 48.8 %
NONHDLC SERPL-MCNC: 100 MG/DL (ref ?–130)
OSMOLALITY SERPL CALC.SUM OF ELEC: 291 MOSM/KG (ref 275–295)
PLATELET # BLD AUTO: 299 10(3)UL (ref 150–450)
POTASSIUM SERPL-SCNC: 4.3 MMOL/L (ref 3.5–5.1)
PROT SERPL-MCNC: 7.1 G/DL (ref 6.4–8.2)
RBC # BLD AUTO: 4.85 X10(6)UL
SODIUM SERPL-SCNC: 141 MMOL/L (ref 136–145)
T4 FREE SERPL-MCNC: 0.9 NG/DL (ref 0.8–1.7)
TRIGL SERPL-MCNC: 117 MG/DL (ref 30–149)
TSI SER-ACNC: 2.76 MIU/ML (ref 0.36–3.74)
VLDLC SERPL CALC-MCNC: 18 MG/DL (ref 0–30)
WBC # BLD AUTO: 5.7 X10(3) UL (ref 4–11)

## 2022-02-17 PROCEDURE — 84153 ASSAY OF PSA TOTAL: CPT | Performed by: FAMILY MEDICINE

## 2022-02-17 PROCEDURE — 80061 LIPID PANEL: CPT | Performed by: FAMILY MEDICINE

## 2022-02-17 PROCEDURE — 80050 GENERAL HEALTH PANEL: CPT | Performed by: FAMILY MEDICINE

## 2022-02-17 PROCEDURE — 84439 ASSAY OF FREE THYROXINE: CPT | Performed by: FAMILY MEDICINE

## 2022-02-18 NOTE — PROGRESS NOTES
Normal white and red blood cell counts. Normal kidney and liver function testing. Normal blood sugar testing.   Normal lipid testing   Normal thyroid testing  Normal prostate testing

## 2022-02-18 NOTE — TELEPHONE ENCOUNTER
From: May President  To: Ravi Garcia PA-C  Sent: 2/17/2022 8:08 PM CST  Subject: Question regarding COMP METABOLIC PANEL (14)    Thanks for getting back to me so quickly with lab results info. I appreciate it!      Melissa Read

## 2022-02-28 RX ORDER — MONTELUKAST SODIUM 10 MG/1
10 TABLET ORAL NIGHTLY
Qty: 90 TABLET | Refills: 0 | Status: SHIPPED | OUTPATIENT
Start: 2022-02-28

## 2022-02-28 RX ORDER — MONTELUKAST SODIUM 10 MG/1
10 TABLET ORAL NIGHTLY
Qty: 90 TABLET | Refills: 0 | OUTPATIENT
Start: 2022-02-28

## 2022-04-20 ENCOUNTER — PATIENT MESSAGE (OUTPATIENT)
Dept: FAMILY MEDICINE CLINIC | Facility: CLINIC | Age: 52
End: 2022-04-20

## 2022-04-20 RX ORDER — FLUTICASONE PROPIONATE AND SALMETEROL 250; 50 UG/1; UG/1
1 POWDER RESPIRATORY (INHALATION) EVERY 12 HOURS SCHEDULED
Qty: 1 EACH | Refills: 0 | Status: SHIPPED | OUTPATIENT
Start: 2022-04-20

## 2022-04-20 NOTE — TELEPHONE ENCOUNTER
From: Eva Mistry  To: Isabella Tafoya PA-C  Sent: 4/20/2022 2:16 PM CDT  Subject: My appointment 4.22.22    Hi. I will be in on Friday for an appointment and just wanted to let you know I have been sick for almost 2 weeks now. It was really bad this past weekend. I feel a little better now, but I can't seem to shake the body pain and weird smell I have/ congestion. It reminds me of an infection but I'm not sure. I took an at home Covid test last Friday after work and it was negative. I have been going to work everyday, but by the end of the day I am pretty worn out. I have not been able to exercise for 2 weeks which is odd for me. I feel like if I were to try and get a workout in, tonight for example, I think I would wipe myself out. I just wanted to give you a heads up and I will see you Friday.     Vivi Sidhu

## 2022-04-20 NOTE — TELEPHONE ENCOUNTER
Pt requesting refill of fluticasone-salmeterol 250-50 MCG/ACT Inhalation Aerosol Powder, Breath Activated    Last Time Me/dication was Prescribed :  08/20/2021    Last Office Visit with Provider: 09/17/2021    Recommended to return by Provider: 6 months    No future appointments.     Asthma & COPD Medication Protocol Failed 04/19/2022 09:12 PM    Appointment in past 6 or next 3 months     AAP/ACT given in last 12 months    Asthma Action Score greater than or equal to 20

## 2022-04-21 ENCOUNTER — OFFICE VISIT (OUTPATIENT)
Dept: FAMILY MEDICINE CLINIC | Facility: CLINIC | Age: 52
End: 2022-04-21
Payer: COMMERCIAL

## 2022-04-21 VITALS
SYSTOLIC BLOOD PRESSURE: 138 MMHG | DIASTOLIC BLOOD PRESSURE: 84 MMHG | TEMPERATURE: 98 F | OXYGEN SATURATION: 97 % | HEART RATE: 82 BPM | RESPIRATION RATE: 18 BRPM

## 2022-04-21 DIAGNOSIS — J01.40 ACUTE NON-RECURRENT PANSINUSITIS: Primary | ICD-10-CM

## 2022-04-21 LAB
OPERATOR ID: NORMAL
POCT LOT NUMBER: NORMAL
RAPID SARS-COV-2 BY PCR: NOT DETECTED

## 2022-04-21 PROCEDURE — 3075F SYST BP GE 130 - 139MM HG: CPT | Performed by: NURSE PRACTITIONER

## 2022-04-21 PROCEDURE — U0002 COVID-19 LAB TEST NON-CDC: HCPCS | Performed by: NURSE PRACTITIONER

## 2022-04-21 PROCEDURE — 99213 OFFICE O/P EST LOW 20 MIN: CPT | Performed by: NURSE PRACTITIONER

## 2022-04-21 PROCEDURE — 3079F DIAST BP 80-89 MM HG: CPT | Performed by: NURSE PRACTITIONER

## 2022-04-21 RX ORDER — AMOXICILLIN AND CLAVULANATE POTASSIUM 875; 125 MG/1; MG/1
1 TABLET, FILM COATED ORAL 2 TIMES DAILY
Qty: 20 TABLET | Refills: 0 | Status: SHIPPED | OUTPATIENT
Start: 2022-04-21 | End: 2022-05-01

## 2022-04-25 ENCOUNTER — PATIENT MESSAGE (OUTPATIENT)
Dept: FAMILY MEDICINE CLINIC | Facility: CLINIC | Age: 52
End: 2022-04-25

## 2022-04-26 ENCOUNTER — APPOINTMENT (OUTPATIENT)
Dept: CT IMAGING | Age: 52
End: 2022-04-26
Attending: EMERGENCY MEDICINE
Payer: COMMERCIAL

## 2022-04-26 ENCOUNTER — TELEPHONE (OUTPATIENT)
Dept: FAMILY MEDICINE CLINIC | Facility: CLINIC | Age: 52
End: 2022-04-26

## 2022-04-26 ENCOUNTER — HOSPITAL ENCOUNTER (EMERGENCY)
Age: 52
Discharge: HOME | End: 2022-04-26
Attending: EMERGENCY MEDICINE
Payer: COMMERCIAL

## 2022-04-26 VITALS
HEART RATE: 89 BPM | DIASTOLIC BLOOD PRESSURE: 82 MMHG | SYSTOLIC BLOOD PRESSURE: 142 MMHG | HEIGHT: 71 IN | RESPIRATION RATE: 16 BRPM | BODY MASS INDEX: 25.2 KG/M2 | OXYGEN SATURATION: 98 % | TEMPERATURE: 99 F | WEIGHT: 180 LBS

## 2022-04-26 DIAGNOSIS — R19.7 DIARRHEA, UNSPECIFIED TYPE: Primary | ICD-10-CM

## 2022-04-26 LAB
ALBUMIN SERPL-MCNC: 3.7 G/DL (ref 3.4–5)
ALBUMIN/GLOB SERPL: 0.9 {RATIO} (ref 1–2)
ALP LIVER SERPL-CCNC: 81 U/L
ALT SERPL-CCNC: 37 U/L
ANION GAP SERPL CALC-SCNC: 8 MMOL/L (ref 0–18)
AST SERPL-CCNC: 25 U/L (ref 15–37)
BASOPHILS # BLD AUTO: 0.07 X10(3) UL (ref 0–0.2)
BASOPHILS NFR BLD AUTO: 0.7 %
BILIRUB SERPL-MCNC: 2 MG/DL (ref 0.1–2)
BILIRUB UR QL CFM: NEGATIVE
BUN BLD-MCNC: 10 MG/DL (ref 7–18)
C DIFF TOX B STL QL: NEGATIVE
CALCIUM BLD-MCNC: 9 MG/DL (ref 8.5–10.1)
CHLORIDE SERPL-SCNC: 103 MMOL/L (ref 98–112)
CLARITY UR REFRACT.AUTO: CLEAR
CO2 SERPL-SCNC: 24 MMOL/L (ref 21–32)
COLOR UR AUTO: YELLOW
CREAT BLD-MCNC: 1.14 MG/DL
EOSINOPHIL # BLD AUTO: 0.72 X10(3) UL (ref 0–0.7)
EOSINOPHIL NFR BLD AUTO: 6.8 %
ERYTHROCYTE [DISTWIDTH] IN BLOOD BY AUTOMATED COUNT: 11.9 %
GLOBULIN PLAS-MCNC: 4.1 G/DL (ref 2.8–4.4)
GLUCOSE BLD-MCNC: 114 MG/DL (ref 70–99)
GLUCOSE UR STRIP.AUTO-MCNC: NEGATIVE MG/DL
HCT VFR BLD AUTO: 50.6 %
HGB BLD-MCNC: 17.1 G/DL
HYALINE CASTS #/AREA URNS AUTO: PRESENT /LPF
IMM GRANULOCYTES # BLD AUTO: 0.05 X10(3) UL (ref 0–1)
IMM GRANULOCYTES NFR BLD: 0.5 %
KETONES UR STRIP.AUTO-MCNC: 15 MG/DL
LEUKOCYTE ESTERASE UR QL STRIP.AUTO: NEGATIVE
LIPASE SERPL-CCNC: 96 U/L (ref 73–393)
LYMPHOCYTES # BLD AUTO: 2.08 X10(3) UL (ref 1–4)
LYMPHOCYTES NFR BLD AUTO: 19.5 %
MCH RBC QN AUTO: 31.4 PG (ref 26–34)
MCHC RBC AUTO-ENTMCNC: 33.8 G/DL (ref 31–37)
MCV RBC AUTO: 92.8 FL
MONOCYTES # BLD AUTO: 0.97 X10(3) UL (ref 0.1–1)
MONOCYTES NFR BLD AUTO: 9.1 %
NEUTROPHILS # BLD AUTO: 6.76 X10 (3) UL (ref 1.5–7.7)
NEUTROPHILS # BLD AUTO: 6.76 X10(3) UL (ref 1.5–7.7)
NEUTROPHILS NFR BLD AUTO: 63.4 %
NITRITE UR QL STRIP.AUTO: NEGATIVE
OSMOLALITY SERPL CALC.SUM OF ELEC: 280 MOSM/KG (ref 275–295)
PH UR STRIP.AUTO: 5.5 [PH] (ref 5–8)
PLATELET # BLD AUTO: 371 10(3)UL (ref 150–450)
POTASSIUM SERPL-SCNC: 3.3 MMOL/L (ref 3.5–5.1)
PROT SERPL-MCNC: 7.8 G/DL (ref 6.4–8.2)
RBC # BLD AUTO: 5.45 X10(6)UL
SARS-COV-2 RNA RESP QL NAA+PROBE: NOT DETECTED
SODIUM SERPL-SCNC: 135 MMOL/L (ref 136–145)
SP GR UR STRIP.AUTO: 1.02 (ref 1–1.03)
UROBILINOGEN UR STRIP.AUTO-MCNC: 0.2 MG/DL
WBC # BLD AUTO: 10.7 X10(3) UL (ref 4–11)

## 2022-04-26 PROCEDURE — 99285 EMERGENCY DEPT VISIT HI MDM: CPT

## 2022-04-26 PROCEDURE — 74177 CT ABD & PELVIS W/CONTRAST: CPT | Performed by: EMERGENCY MEDICINE

## 2022-04-26 PROCEDURE — S0028 INJECTION, FAMOTIDINE, 20 MG: HCPCS | Performed by: EMERGENCY MEDICINE

## 2022-04-26 PROCEDURE — 87427 SHIGA-LIKE TOXIN AG IA: CPT | Performed by: EMERGENCY MEDICINE

## 2022-04-26 PROCEDURE — 85025 COMPLETE CBC W/AUTO DIFF WBC: CPT | Performed by: EMERGENCY MEDICINE

## 2022-04-26 PROCEDURE — 87045 FECES CULTURE AEROBIC BACT: CPT | Performed by: EMERGENCY MEDICINE

## 2022-04-26 PROCEDURE — 81001 URINALYSIS AUTO W/SCOPE: CPT | Performed by: EMERGENCY MEDICINE

## 2022-04-26 PROCEDURE — 87046 STOOL CULTR AEROBIC BACT EA: CPT | Performed by: EMERGENCY MEDICINE

## 2022-04-26 PROCEDURE — 82272 OCCULT BLD FECES 1-3 TESTS: CPT | Performed by: EMERGENCY MEDICINE

## 2022-04-26 PROCEDURE — 80053 COMPREHEN METABOLIC PANEL: CPT | Performed by: EMERGENCY MEDICINE

## 2022-04-26 PROCEDURE — 96375 TX/PRO/DX INJ NEW DRUG ADDON: CPT

## 2022-04-26 PROCEDURE — 83690 ASSAY OF LIPASE: CPT | Performed by: EMERGENCY MEDICINE

## 2022-04-26 PROCEDURE — 82272 OCCULT BLD FECES 1-3 TESTS: CPT

## 2022-04-26 PROCEDURE — 96374 THER/PROPH/DIAG INJ IV PUSH: CPT

## 2022-04-26 PROCEDURE — 87493 C DIFF AMPLIFIED PROBE: CPT | Performed by: EMERGENCY MEDICINE

## 2022-04-26 PROCEDURE — 96361 HYDRATE IV INFUSION ADD-ON: CPT

## 2022-04-26 RX ORDER — DICYCLOMINE HCL 20 MG
20 TABLET ORAL 4 TIMES DAILY PRN
Qty: 30 TABLET | Refills: 0 | Status: SHIPPED | OUTPATIENT
Start: 2022-04-26 | End: 2022-05-26

## 2022-04-26 RX ORDER — KETOROLAC TROMETHAMINE 15 MG/ML
15 INJECTION, SOLUTION INTRAMUSCULAR; INTRAVENOUS ONCE
Status: COMPLETED | OUTPATIENT
Start: 2022-04-26 | End: 2022-04-26

## 2022-04-26 RX ORDER — PANTOPRAZOLE SODIUM 40 MG/1
40 TABLET, DELAYED RELEASE ORAL DAILY
Qty: 30 TABLET | Refills: 0 | Status: SHIPPED | OUTPATIENT
Start: 2022-04-26 | End: 2022-05-26

## 2022-04-26 RX ORDER — FAMOTIDINE 10 MG/ML
20 INJECTION, SOLUTION INTRAVENOUS ONCE
Status: COMPLETED | OUTPATIENT
Start: 2022-04-26 | End: 2022-04-26

## 2022-04-26 RX ORDER — IOHEXOL 350 MG/ML
100 INJECTION, SOLUTION INTRAVENOUS
Status: COMPLETED | OUTPATIENT
Start: 2022-04-26 | End: 2022-04-26

## 2022-04-26 NOTE — TELEPHONE ENCOUNTER
From: Vickey Acosta  To: John Marin PA-C  Sent: 4/25/2022 8:19 PM CDT  Subject: amoxicillin clavulanate    Hi. I messaged last week for an appointment but I couldn't come in because I had flu symptoms. So I went to the walk in clinic in [place] on [address] and they gave me amoxicillin clavulanate. Thursday & Friday were ok. By Saturday night and beyond my stomach is extremely sore and I'm having trouble eating. Is there anything else you can prescribe to help me? They assumed I had a sinus infection, but this medicine it is definitely not working out. I went to work today but it was difficult. Let me know please.     Thanks,  [employer]

## 2022-04-26 NOTE — TELEPHONE ENCOUNTER
Pt calling clinic, was seen on 4/21 and prescribed Augmentin for sinus infection. States for the past several days he has had abdominal pain which he assumed to be a reaction to the medication. States last night and today the abdominal pain was \"severe. \" Unable to sleep due to pain. Also reports black stools. He stopped taking the antibiotic yesterday but does not feel better. Had to stay home from work today. Advised patient he may have a reaction to this antibiotic but if he is reporting the pain is severe with black stools he should be evaluated in person. Pt states he is going to head over to [facility].

## 2022-04-26 NOTE — TELEPHONE ENCOUNTER
Spoke to patient d/t symptoms. He states he is at the ER to be evaluated. Advised to call back and schedule a f/u with BOD[facility]. Cameron HOUSER.

## 2022-04-26 NOTE — ED INITIAL ASSESSMENT (HPI)
Pt to ed with generalized abdominal pain x 3 days, + diarrhea dark in color that started Saturday   Was seen in walk in clinic and placed on abx for sinus infection x 1 week ago   Decreased appetite   hx of diverticulitis

## 2022-05-13 ENCOUNTER — OFFICE VISIT (OUTPATIENT)
Dept: FAMILY MEDICINE CLINIC | Facility: CLINIC | Age: 52
End: 2022-05-13
Payer: COMMERCIAL

## 2022-05-13 VITALS
WEIGHT: 175 LBS | BODY MASS INDEX: 24.5 KG/M2 | TEMPERATURE: 98 F | HEART RATE: 64 BPM | OXYGEN SATURATION: 98 % | DIASTOLIC BLOOD PRESSURE: 72 MMHG | HEIGHT: 71.02 IN | SYSTOLIC BLOOD PRESSURE: 110 MMHG

## 2022-05-13 DIAGNOSIS — K27.9 PEPTIC ULCER DISEASE: ICD-10-CM

## 2022-05-13 DIAGNOSIS — F41.8 SITUATIONAL ANXIETY: ICD-10-CM

## 2022-05-13 DIAGNOSIS — Z23 NEED FOR VACCINATION: ICD-10-CM

## 2022-05-13 DIAGNOSIS — Z91.09 ENVIRONMENTAL ALLERGIES: ICD-10-CM

## 2022-05-13 DIAGNOSIS — J45.40 MODERATE PERSISTENT ASTHMA WITHOUT COMPLICATION: Primary | ICD-10-CM

## 2022-05-13 PROCEDURE — 3074F SYST BP LT 130 MM HG: CPT | Performed by: FAMILY MEDICINE

## 2022-05-13 PROCEDURE — 90732 PPSV23 VACC 2 YRS+ SUBQ/IM: CPT | Performed by: FAMILY MEDICINE

## 2022-05-13 PROCEDURE — 3008F BODY MASS INDEX DOCD: CPT | Performed by: FAMILY MEDICINE

## 2022-05-13 PROCEDURE — 90471 IMMUNIZATION ADMIN: CPT | Performed by: FAMILY MEDICINE

## 2022-05-13 PROCEDURE — 99214 OFFICE O/P EST MOD 30 MIN: CPT | Performed by: FAMILY MEDICINE

## 2022-05-13 PROCEDURE — 3078F DIAST BP <80 MM HG: CPT | Performed by: FAMILY MEDICINE

## 2022-05-13 RX ORDER — FLUTICASONE PROPIONATE AND SALMETEROL 250; 50 UG/1; UG/1
1 POWDER RESPIRATORY (INHALATION) EVERY 12 HOURS SCHEDULED
Qty: 1 EACH | Refills: 5 | Status: SHIPPED | OUTPATIENT
Start: 2022-05-13

## 2022-05-13 RX ORDER — BUSPIRONE HYDROCHLORIDE 5 MG/1
5 TABLET ORAL 2 TIMES DAILY PRN
Qty: 20 TABLET | Refills: 0 | Status: SHIPPED | OUTPATIENT
Start: 2022-05-13

## 2022-05-13 RX ORDER — MONTELUKAST SODIUM 10 MG/1
10 TABLET ORAL NIGHTLY
Qty: 90 TABLET | Refills: 3 | Status: SHIPPED | OUTPATIENT
Start: 2022-05-13

## 2022-05-13 RX ORDER — ALBUTEROL SULFATE 90 UG/1
2 AEROSOL, METERED RESPIRATORY (INHALATION)
Qty: 1 EACH | Refills: 1 | Status: SHIPPED | OUTPATIENT
Start: 2022-05-13

## 2022-05-23 ENCOUNTER — PATIENT MESSAGE (OUTPATIENT)
Dept: FAMILY MEDICINE CLINIC | Facility: CLINIC | Age: 52
End: 2022-05-23

## 2022-05-23 DIAGNOSIS — F41.8 SITUATIONAL ANXIETY: ICD-10-CM

## 2022-05-23 RX ORDER — BUSPIRONE HYDROCHLORIDE 10 MG/1
10 TABLET ORAL EVERY MORNING
Qty: 90 TABLET | Refills: 0 | Status: SHIPPED | OUTPATIENT
Start: 2022-05-23 | End: 2023-05-18

## 2022-05-23 NOTE — TELEPHONE ENCOUNTER
From: Jeffery Torres  To: Azar Benson PA-C  Sent: 5/23/2022 12:24 PM CDT  Subject: busPIRone     Good afternoon Davina Lunger. I have been using the busPIRone for 7 days now and I feel like it helps me thru some of the more stressful situations at work. I tried 5 mg for a couple of days and then 10 mg a couple of days and I think 10 mg works best. I'd like to continue using it for at least the short term to see if it will continue to help me out. Will you be able to send in an Rx for me?     Fermin Cordova

## 2022-06-11 ENCOUNTER — LAB ENCOUNTER (OUTPATIENT)
Dept: LAB | Age: 52
End: 2022-06-11
Attending: INTERNAL MEDICINE
Payer: COMMERCIAL

## 2022-06-11 DIAGNOSIS — Z01.818 PRE-OP TESTING: ICD-10-CM

## 2022-06-12 LAB — SARS-COV-2 RNA RESP QL NAA+PROBE: NOT DETECTED

## 2022-06-14 ENCOUNTER — PATIENT MESSAGE (OUTPATIENT)
Dept: FAMILY MEDICINE CLINIC | Facility: CLINIC | Age: 52
End: 2022-06-14

## 2022-06-14 PROBLEM — K92.1 BLOOD IN STOOL: Status: ACTIVE | Noted: 2022-06-14

## 2022-06-14 PROBLEM — R10.13 ABDOMINAL PAIN, EPIGASTRIC: Status: ACTIVE | Noted: 2022-06-14

## 2022-06-14 NOTE — TELEPHONE ENCOUNTER
From: Richard Case  To: Guy Martinez PA-C  Sent: 6/14/2022 10:20 AM CDT  Subject: Future Diagnostics Results    Good morning. Attached is the results of the CT scan from a year ago. Please let me know if I need to mail or drop off the original documents.     Anthony Woody

## 2022-06-24 RX ORDER — BUSPIRONE HYDROCHLORIDE 10 MG/1
10 TABLET ORAL EVERY MORNING
Qty: 90 TABLET | Refills: 0 | OUTPATIENT
Start: 2022-06-24 | End: 2023-06-19

## 2022-07-18 ENCOUNTER — HOSPITAL ENCOUNTER (EMERGENCY)
Age: 52
Discharge: HOME | End: 2022-07-18
Attending: EMERGENCY MEDICINE
Payer: COMMERCIAL

## 2022-07-18 ENCOUNTER — APPOINTMENT (OUTPATIENT)
Dept: GENERAL RADIOLOGY | Age: 52
End: 2022-07-18
Attending: EMERGENCY MEDICINE
Payer: COMMERCIAL

## 2022-07-18 ENCOUNTER — APPOINTMENT (OUTPATIENT)
Dept: CT IMAGING | Age: 52
End: 2022-07-18
Attending: EMERGENCY MEDICINE
Payer: COMMERCIAL

## 2022-07-18 VITALS
TEMPERATURE: 99 F | HEART RATE: 78 BPM | DIASTOLIC BLOOD PRESSURE: 83 MMHG | BODY MASS INDEX: 25.2 KG/M2 | OXYGEN SATURATION: 94 % | SYSTOLIC BLOOD PRESSURE: 126 MMHG | HEIGHT: 71 IN | RESPIRATION RATE: 18 BRPM | WEIGHT: 180 LBS

## 2022-07-18 DIAGNOSIS — J18.9 COMMUNITY ACQUIRED PNEUMONIA OF RIGHT UPPER LOBE OF LUNG: Primary | ICD-10-CM

## 2022-07-18 LAB
ALBUMIN SERPL-MCNC: 3.6 G/DL (ref 3.4–5)
ALBUMIN/GLOB SERPL: 0.8 {RATIO} (ref 1–2)
ALP LIVER SERPL-CCNC: 75 U/L
ALT SERPL-CCNC: 34 U/L
ANION GAP SERPL CALC-SCNC: 9 MMOL/L (ref 0–18)
AST SERPL-CCNC: 30 U/L (ref 15–37)
ATRIAL RATE: 92 BPM
BASOPHILS # BLD AUTO: 0.03 X10(3) UL (ref 0–0.2)
BASOPHILS NFR BLD AUTO: 0.4 %
BILIRUB SERPL-MCNC: 1.5 MG/DL (ref 0.1–2)
BUN BLD-MCNC: 13 MG/DL (ref 7–18)
CALCIUM BLD-MCNC: 8.8 MG/DL (ref 8.5–10.1)
CHLORIDE SERPL-SCNC: 101 MMOL/L (ref 98–112)
CO2 SERPL-SCNC: 22 MMOL/L (ref 21–32)
CREAT BLD-MCNC: 1.39 MG/DL
EOSINOPHIL # BLD AUTO: 0.12 X10(3) UL (ref 0–0.7)
EOSINOPHIL NFR BLD AUTO: 1.6 %
ERYTHROCYTE [DISTWIDTH] IN BLOOD BY AUTOMATED COUNT: 11.9 %
GLOBULIN PLAS-MCNC: 4.4 G/DL (ref 2.8–4.4)
GLUCOSE BLD-MCNC: 171 MG/DL (ref 70–99)
HCT VFR BLD AUTO: 46.5 %
HGB BLD-MCNC: 15.6 G/DL
IMM GRANULOCYTES # BLD AUTO: 0.03 X10(3) UL (ref 0–1)
IMM GRANULOCYTES NFR BLD: 0.4 %
LYMPHOCYTES # BLD AUTO: 0.93 X10(3) UL (ref 1–4)
LYMPHOCYTES NFR BLD AUTO: 12.4 %
MCH RBC QN AUTO: 30.8 PG (ref 26–34)
MCHC RBC AUTO-ENTMCNC: 33.5 G/DL (ref 31–37)
MCV RBC AUTO: 91.7 FL
MONOCYTES # BLD AUTO: 0.75 X10(3) UL (ref 0.1–1)
MONOCYTES NFR BLD AUTO: 10 %
NEUTROPHILS # BLD AUTO: 5.63 X10 (3) UL (ref 1.5–7.7)
NEUTROPHILS # BLD AUTO: 5.63 X10(3) UL (ref 1.5–7.7)
NEUTROPHILS NFR BLD AUTO: 75.2 %
OSMOLALITY SERPL CALC.SUM OF ELEC: 278 MOSM/KG (ref 275–295)
P AXIS: 45 DEGREES
P-R INTERVAL: 158 MS
PLATELET # BLD AUTO: 211 10(3)UL (ref 150–450)
POTASSIUM SERPL-SCNC: 3.7 MMOL/L (ref 3.5–5.1)
PROT SERPL-MCNC: 8 G/DL (ref 6.4–8.2)
Q-T INTERVAL: 334 MS
QRS DURATION: 94 MS
QTC CALCULATION (BEZET): 413 MS
R AXIS: -3 DEGREES
RBC # BLD AUTO: 5.07 X10(6)UL
SARS-COV-2 RNA RESP QL NAA+PROBE: NOT DETECTED
SODIUM SERPL-SCNC: 132 MMOL/L (ref 136–145)
T AXIS: 36 DEGREES
TROPONIN I HIGH SENSITIVITY: 5 NG/L
VENTRICULAR RATE: 92 BPM
WBC # BLD AUTO: 7.5 X10(3) UL (ref 4–11)

## 2022-07-18 PROCEDURE — 36415 COLL VENOUS BLD VENIPUNCTURE: CPT

## 2022-07-18 PROCEDURE — 93005 ELECTROCARDIOGRAM TRACING: CPT

## 2022-07-18 PROCEDURE — 99284 EMERGENCY DEPT VISIT MOD MDM: CPT

## 2022-07-18 PROCEDURE — 71275 CT ANGIOGRAPHY CHEST: CPT | Performed by: EMERGENCY MEDICINE

## 2022-07-18 PROCEDURE — 93010 ELECTROCARDIOGRAM REPORT: CPT

## 2022-07-18 PROCEDURE — 85025 COMPLETE CBC W/AUTO DIFF WBC: CPT | Performed by: EMERGENCY MEDICINE

## 2022-07-18 PROCEDURE — 80053 COMPREHEN METABOLIC PANEL: CPT | Performed by: EMERGENCY MEDICINE

## 2022-07-18 PROCEDURE — 94640 AIRWAY INHALATION TREATMENT: CPT

## 2022-07-18 PROCEDURE — 84484 ASSAY OF TROPONIN QUANT: CPT | Performed by: EMERGENCY MEDICINE

## 2022-07-18 RX ORDER — DOXYCYCLINE HYCLATE 100 MG/1
100 CAPSULE ORAL 2 TIMES DAILY
Qty: 14 CAPSULE | Refills: 0 | Status: SHIPPED | OUTPATIENT
Start: 2022-07-18 | End: 2022-07-25

## 2022-07-18 RX ORDER — IPRATROPIUM BROMIDE AND ALBUTEROL SULFATE 2.5; .5 MG/3ML; MG/3ML
3 SOLUTION RESPIRATORY (INHALATION) ONCE
Status: COMPLETED | OUTPATIENT
Start: 2022-07-18 | End: 2022-07-18

## 2022-07-18 RX ORDER — PREDNISONE 20 MG/1
40 TABLET ORAL ONCE
Status: COMPLETED | OUTPATIENT
Start: 2022-07-18 | End: 2022-07-18

## 2022-07-18 NOTE — ED INITIAL ASSESSMENT (HPI)
Pt has been sick since Friday having cough up phlegm light yellow and blood this morning. Neg covid on Friday. Fever last night and this morning.

## 2022-07-26 ENCOUNTER — PATIENT MESSAGE (OUTPATIENT)
Dept: FAMILY MEDICINE CLINIC | Facility: CLINIC | Age: 52
End: 2022-07-26

## 2022-07-26 DIAGNOSIS — J45.40 MODERATE PERSISTENT ASTHMA WITHOUT COMPLICATION: ICD-10-CM

## 2022-07-27 ENCOUNTER — PATIENT MESSAGE (OUTPATIENT)
Dept: FAMILY MEDICINE CLINIC | Facility: CLINIC | Age: 52
End: 2022-07-27

## 2022-07-27 RX ORDER — ALBUTEROL SULFATE 90 UG/1
2 AEROSOL, METERED RESPIRATORY (INHALATION)
Qty: 1 EACH | Refills: 1 | Status: SHIPPED | OUTPATIENT
Start: 2022-07-27

## 2022-07-27 NOTE — TELEPHONE ENCOUNTER
From: Elkin Reeves  To: Ky Daigle PA-C  Sent: 7/26/2022 7:13 PM CDT  Subject: Refill Rx    Can I get a refill for my Albuterol inhaler? I am almost out.     Celsa Turpin

## 2022-08-18 RX ORDER — BUSPIRONE HYDROCHLORIDE 10 MG/1
10 TABLET ORAL EVERY MORNING
Qty: 30 TABLET | Refills: 0 | Status: SHIPPED | OUTPATIENT
Start: 2022-08-18 | End: 2023-08-13

## 2022-09-09 ENCOUNTER — PATIENT MESSAGE (OUTPATIENT)
Dept: FAMILY MEDICINE CLINIC | Facility: CLINIC | Age: 52
End: 2022-09-09

## 2022-09-12 NOTE — TELEPHONE ENCOUNTER
From: Luis New  To: Adama Betancourt PA-C  Sent: 9/9/2022 4:26 PM CDT  Subject: Flu Shot    Does your office currently offer flu shots and/ or Covid booster shots? I have an appointment on 9.16.22 and was wondering.     Thanks,  [employer]

## 2022-09-16 ENCOUNTER — OFFICE VISIT (OUTPATIENT)
Dept: FAMILY MEDICINE CLINIC | Facility: CLINIC | Age: 52
End: 2022-09-16
Payer: COMMERCIAL

## 2022-09-16 VITALS
WEIGHT: 176 LBS | HEART RATE: 66 BPM | OXYGEN SATURATION: 98 % | DIASTOLIC BLOOD PRESSURE: 80 MMHG | HEIGHT: 71.18 IN | SYSTOLIC BLOOD PRESSURE: 132 MMHG | BODY MASS INDEX: 24.37 KG/M2 | TEMPERATURE: 98 F

## 2022-09-16 DIAGNOSIS — Z13.21 SCREENING FOR ENDOCRINE, NUTRITIONAL, METABOLIC AND IMMUNITY DISORDER: ICD-10-CM

## 2022-09-16 DIAGNOSIS — J45.40 MODERATE PERSISTENT ASTHMA WITHOUT COMPLICATION: ICD-10-CM

## 2022-09-16 DIAGNOSIS — Z13.29 SCREENING FOR ENDOCRINE, NUTRITIONAL, METABOLIC AND IMMUNITY DISORDER: ICD-10-CM

## 2022-09-16 DIAGNOSIS — Z13.220 ENCOUNTER FOR LIPID SCREENING FOR CARDIOVASCULAR DISEASE: ICD-10-CM

## 2022-09-16 DIAGNOSIS — R91.8 GROUND GLASS OPACITY PRESENT ON IMAGING OF LUNG: ICD-10-CM

## 2022-09-16 DIAGNOSIS — K22.70 BARRETT'S ESOPHAGUS WITHOUT DYSPLASIA: ICD-10-CM

## 2022-09-16 DIAGNOSIS — Z13.6 SCREENING FOR HEART DISEASE: ICD-10-CM

## 2022-09-16 DIAGNOSIS — Z87.891 HISTORY OF SMOKING 30 OR MORE PACK YEARS: ICD-10-CM

## 2022-09-16 DIAGNOSIS — R93.89 ABNORMAL CT OF THE CHEST: ICD-10-CM

## 2022-09-16 DIAGNOSIS — Z13.0 SCREENING FOR ENDOCRINE, NUTRITIONAL, METABOLIC AND IMMUNITY DISORDER: ICD-10-CM

## 2022-09-16 DIAGNOSIS — F41.1 GAD (GENERALIZED ANXIETY DISORDER): ICD-10-CM

## 2022-09-16 DIAGNOSIS — Z79.899 MEDICATION MANAGEMENT: ICD-10-CM

## 2022-09-16 DIAGNOSIS — R73.9 HYPERGLYCEMIA: ICD-10-CM

## 2022-09-16 DIAGNOSIS — Z12.5 SCREENING PSA (PROSTATE SPECIFIC ANTIGEN): ICD-10-CM

## 2022-09-16 DIAGNOSIS — Z87.09 HISTORY OF LUNG ABSCESS: ICD-10-CM

## 2022-09-16 DIAGNOSIS — Z00.00 WELL ADULT EXAM: Primary | ICD-10-CM

## 2022-09-16 DIAGNOSIS — Z13.228 SCREENING FOR ENDOCRINE, NUTRITIONAL, METABOLIC AND IMMUNITY DISORDER: ICD-10-CM

## 2022-09-16 DIAGNOSIS — Z13.6 ENCOUNTER FOR LIPID SCREENING FOR CARDIOVASCULAR DISEASE: ICD-10-CM

## 2022-09-16 LAB
CARTRIDGE LOT#: 995 NUMERIC
HEMOGLOBIN A1C: 5 % (ref 4.3–5.6)

## 2022-09-16 PROCEDURE — 3008F BODY MASS INDEX DOCD: CPT | Performed by: FAMILY MEDICINE

## 2022-09-16 PROCEDURE — 3079F DIAST BP 80-89 MM HG: CPT | Performed by: FAMILY MEDICINE

## 2022-09-16 PROCEDURE — 83036 HEMOGLOBIN GLYCOSYLATED A1C: CPT | Performed by: FAMILY MEDICINE

## 2022-09-16 PROCEDURE — 99214 OFFICE O/P EST MOD 30 MIN: CPT | Performed by: FAMILY MEDICINE

## 2022-09-16 PROCEDURE — 99396 PREV VISIT EST AGE 40-64: CPT | Performed by: FAMILY MEDICINE

## 2022-09-16 PROCEDURE — 3075F SYST BP GE 130 - 139MM HG: CPT | Performed by: FAMILY MEDICINE

## 2022-09-16 RX ORDER — PANTOPRAZOLE SODIUM 40 MG/1
40 TABLET, DELAYED RELEASE ORAL DAILY
Qty: 90 TABLET | Refills: 3 | Status: SHIPPED | OUTPATIENT
Start: 2022-09-16 | End: 2023-01-14

## 2022-09-16 RX ORDER — BUSPIRONE HYDROCHLORIDE 15 MG/1
15 TABLET ORAL EVERY MORNING
Qty: 90 TABLET | Refills: 1 | Status: SHIPPED | OUTPATIENT
Start: 2022-09-16

## 2022-09-17 ENCOUNTER — PATIENT MESSAGE (OUTPATIENT)
Dept: FAMILY MEDICINE CLINIC | Facility: CLINIC | Age: 52
End: 2022-09-17

## 2022-09-17 PROBLEM — K92.1 BLOOD IN STOOL: Status: RESOLVED | Noted: 2022-06-14 | Resolved: 2022-09-17

## 2022-09-17 PROBLEM — R10.13 ABDOMINAL PAIN, EPIGASTRIC: Status: RESOLVED | Noted: 2022-06-14 | Resolved: 2022-09-17

## 2022-09-19 NOTE — TELEPHONE ENCOUNTER
From: Shell Kitchen  To: Yevgeniy Banks PA-C  Sent: 9/17/2022 5:12 PM CDT  Subject: Flu shot     Hello.  I just got my flu shot from [address] on [address] [place] [address].    [employer]

## 2022-09-26 ENCOUNTER — PATIENT MESSAGE (OUTPATIENT)
Dept: FAMILY MEDICINE CLINIC | Facility: CLINIC | Age: 52
End: 2022-09-26

## 2022-09-27 NOTE — TELEPHONE ENCOUNTER
From: Karen Thompson  To: Louis Murray PA-C  Sent: 9/26/2022 6:29 PM CDT  Subject: Booster shot     Hi.  I just received a Pfizer before booster shot @ [address].    Emily Cantu

## 2022-11-15 DIAGNOSIS — J45.40 MODERATE PERSISTENT ASTHMA WITHOUT COMPLICATION: ICD-10-CM

## 2022-11-16 RX ORDER — FLUTICASONE PROPIONATE AND SALMETEROL 250; 50 UG/1; UG/1
1 POWDER RESPIRATORY (INHALATION) EVERY 12 HOURS SCHEDULED
Qty: 1 EACH | Refills: 5 | Status: SHIPPED | OUTPATIENT
Start: 2022-11-16

## 2022-11-28 NOTE — TELEPHONE ENCOUNTER
Per Stalin Kline PA-C, the CT from 1/14/2020 was successfully faxed to [facility] at [phone] for comparison. Anesthesia Type: 1% lidocaine with epinephrine

## 2023-02-10 ENCOUNTER — PATIENT MESSAGE (OUTPATIENT)
Dept: FAMILY MEDICINE CLINIC | Facility: CLINIC | Age: 53
End: 2023-02-10

## 2023-02-10 NOTE — TELEPHONE ENCOUNTER
From: Mai Lopez  To:  Glory Morales PA-C  Sent: 2/10/2023 1:49 PM CST  Subject: Blood work    Can I just walk into an [facility] facility to take the following tests or do you need to let them know first?  COMP METABOLIC PANEL (14), CBC WITH DIFFERENTIAL WITH PLATELET, LIPID PANEL, PSA SCREEN, CT CALCIUM SCORING    Thanks,  [employer]

## 2023-02-15 ENCOUNTER — LAB ENCOUNTER (OUTPATIENT)
Dept: LAB | Age: 53
End: 2023-02-15
Attending: FAMILY MEDICINE
Payer: COMMERCIAL

## 2023-02-15 DIAGNOSIS — Z13.29 SCREENING FOR ENDOCRINE, NUTRITIONAL, METABOLIC AND IMMUNITY DISORDER: ICD-10-CM

## 2023-02-15 DIAGNOSIS — Z12.5 SCREENING PSA (PROSTATE SPECIFIC ANTIGEN): ICD-10-CM

## 2023-02-15 DIAGNOSIS — Z13.6 ENCOUNTER FOR LIPID SCREENING FOR CARDIOVASCULAR DISEASE: ICD-10-CM

## 2023-02-15 DIAGNOSIS — Z13.21 SCREENING FOR ENDOCRINE, NUTRITIONAL, METABOLIC AND IMMUNITY DISORDER: ICD-10-CM

## 2023-02-15 DIAGNOSIS — Z13.220 ENCOUNTER FOR LIPID SCREENING FOR CARDIOVASCULAR DISEASE: ICD-10-CM

## 2023-02-15 DIAGNOSIS — Z13.0 SCREENING FOR ENDOCRINE, NUTRITIONAL, METABOLIC AND IMMUNITY DISORDER: ICD-10-CM

## 2023-02-15 DIAGNOSIS — Z13.228 SCREENING FOR ENDOCRINE, NUTRITIONAL, METABOLIC AND IMMUNITY DISORDER: ICD-10-CM

## 2023-02-15 LAB
ALBUMIN SERPL-MCNC: 3.7 G/DL (ref 3.4–5)
ALBUMIN/GLOB SERPL: 1.1 {RATIO} (ref 1–2)
ALP LIVER SERPL-CCNC: 61 U/L
ALT SERPL-CCNC: 36 U/L
ANION GAP SERPL CALC-SCNC: 3 MMOL/L (ref 0–18)
AST SERPL-CCNC: 41 U/L (ref 15–37)
BASOPHILS # BLD AUTO: 0.05 X10(3) UL (ref 0–0.2)
BASOPHILS NFR BLD AUTO: 1 %
BILIRUB SERPL-MCNC: 2.6 MG/DL (ref 0.1–2)
BUN BLD-MCNC: 11 MG/DL (ref 7–18)
CALCIUM BLD-MCNC: 9.2 MG/DL (ref 8.5–10.1)
CHLORIDE SERPL-SCNC: 105 MMOL/L (ref 98–112)
CHOLEST SERPL-MCNC: 176 MG/DL (ref ?–200)
CO2 SERPL-SCNC: 28 MMOL/L (ref 21–32)
COMPLEXED PSA SERPL-MCNC: 1.49 NG/ML (ref ?–4)
CREAT BLD-MCNC: 1.24 MG/DL
EOSINOPHIL # BLD AUTO: 0.35 X10(3) UL (ref 0–0.7)
EOSINOPHIL NFR BLD AUTO: 7 %
ERYTHROCYTE [DISTWIDTH] IN BLOOD BY AUTOMATED COUNT: 11.8 %
FASTING PATIENT LIPID ANSWER: YES
FASTING STATUS PATIENT QL REPORTED: YES
GFR SERPLBLD BASED ON 1.73 SQ M-ARVRAT: 70 ML/MIN/1.73M2 (ref 60–?)
GLOBULIN PLAS-MCNC: 3.5 G/DL (ref 2.8–4.4)
GLUCOSE BLD-MCNC: 110 MG/DL (ref 70–99)
HCT VFR BLD AUTO: 46 %
HDLC SERPL-MCNC: 66 MG/DL (ref 40–59)
HGB BLD-MCNC: 15.4 G/DL
IMM GRANULOCYTES # BLD AUTO: 0.01 X10(3) UL (ref 0–1)
IMM GRANULOCYTES NFR BLD: 0.2 %
LDLC SERPL CALC-MCNC: 80 MG/DL (ref ?–100)
LYMPHOCYTES # BLD AUTO: 1.57 X10(3) UL (ref 1–4)
LYMPHOCYTES NFR BLD AUTO: 31.3 %
MCH RBC QN AUTO: 31.8 PG (ref 26–34)
MCHC RBC AUTO-ENTMCNC: 33.5 G/DL (ref 31–37)
MCV RBC AUTO: 94.8 FL
MONOCYTES # BLD AUTO: 0.4 X10(3) UL (ref 0.1–1)
MONOCYTES NFR BLD AUTO: 8 %
NEUTROPHILS # BLD AUTO: 2.63 X10 (3) UL (ref 1.5–7.7)
NEUTROPHILS # BLD AUTO: 2.63 X10(3) UL (ref 1.5–7.7)
NEUTROPHILS NFR BLD AUTO: 52.5 %
NONHDLC SERPL-MCNC: 110 MG/DL (ref ?–130)
OSMOLALITY SERPL CALC.SUM OF ELEC: 282 MOSM/KG (ref 275–295)
PLATELET # BLD AUTO: 254 10(3)UL (ref 150–450)
POTASSIUM SERPL-SCNC: 4 MMOL/L (ref 3.5–5.1)
PROT SERPL-MCNC: 7.2 G/DL (ref 6.4–8.2)
RBC # BLD AUTO: 4.85 X10(6)UL
SODIUM SERPL-SCNC: 136 MMOL/L (ref 136–145)
T4 FREE SERPL-MCNC: 0.8 NG/DL (ref 0.8–1.7)
TRIGL SERPL-MCNC: 178 MG/DL (ref 30–149)
TSI SER-ACNC: 3.01 MIU/ML (ref 0.36–3.74)
VLDLC SERPL CALC-MCNC: 28 MG/DL (ref 0–30)
WBC # BLD AUTO: 5 X10(3) UL (ref 4–11)

## 2023-02-15 PROCEDURE — 84439 ASSAY OF FREE THYROXINE: CPT | Performed by: FAMILY MEDICINE

## 2023-02-15 PROCEDURE — 84153 ASSAY OF PSA TOTAL: CPT | Performed by: FAMILY MEDICINE

## 2023-02-15 PROCEDURE — 80061 LIPID PANEL: CPT | Performed by: FAMILY MEDICINE

## 2023-02-15 PROCEDURE — 80050 GENERAL HEALTH PANEL: CPT | Performed by: FAMILY MEDICINE

## 2023-02-16 ENCOUNTER — TELEPHONE (OUTPATIENT)
Dept: FAMILY MEDICINE CLINIC | Facility: CLINIC | Age: 53
End: 2023-02-16

## 2023-02-16 DIAGNOSIS — R74.8 ELEVATED LIVER ENZYMES: Primary | ICD-10-CM

## 2023-02-16 NOTE — TELEPHONE ENCOUNTER
----- Message from Ky Daigle PA-C sent at 2/15/2023  8:04 PM CST -----  Normal white and red blood cell counts. Normal kidney function testing. Mild increase in the AST and bilirubin liver function tests repeat LFTs in 1 month follow-up in the office if any abdominal pain or issues with nausea vomiting or diarrhea.   Mild elevation in blood sugars with normal hemoglobin A1c in the last 6 months  Diet with low carbohydrates due to mild elevation blood sugar and triglycerides  Normal lipid testing   Normal thyroid testing  Normal prostate testing  Repeat LFTs in 1 month

## 2023-02-16 NOTE — PROGRESS NOTES
Normal white and red blood cell counts. Normal kidney function testing. Mild increase in the AST and bilirubin liver function tests repeat LFTs in 1 month follow-up in the office if any abdominal pain or issues with nausea vomiting or diarrhea.   Mild elevation in blood sugars with normal hemoglobin A1c in the last 6 months  Diet with low carbohydrates due to mild elevation blood sugar and triglycerides  Normal lipid testing   Normal thyroid testing  Normal prostate testing  Repeat LFTs in 1 month

## 2023-03-10 ENCOUNTER — PATIENT MESSAGE (OUTPATIENT)
Dept: FAMILY MEDICINE CLINIC | Facility: CLINIC | Age: 53
End: 2023-03-10

## 2023-03-13 NOTE — TELEPHONE ENCOUNTER
From: Trae Bsos  To: Bennie Quiroga PA-C  Sent: 3/10/2023 10:00 AM CST  Subject: Refill not on my list    Good morning. Can you call in a refill for my pantoprazole? I see that it is no longer in my list of medications for some reason.      Thanks,  [employer]

## 2023-03-14 RX ORDER — PANTOPRAZOLE SODIUM 40 MG/1
40 TABLET, DELAYED RELEASE ORAL DAILY
Qty: 90 TABLET | Refills: 1 | Status: SHIPPED | OUTPATIENT
Start: 2023-03-14 | End: 2023-07-12

## 2023-03-17 DIAGNOSIS — J45.40 MODERATE PERSISTENT ASTHMA WITHOUT COMPLICATION: ICD-10-CM

## 2023-03-17 RX ORDER — FLUTICASONE PROPIONATE AND SALMETEROL 250; 50 UG/1; UG/1
1 POWDER RESPIRATORY (INHALATION) EVERY 12 HOURS SCHEDULED
Qty: 1 EACH | Refills: 5 | OUTPATIENT
Start: 2023-03-17

## 2023-03-31 ENCOUNTER — OFFICE VISIT (OUTPATIENT)
Dept: FAMILY MEDICINE CLINIC | Facility: CLINIC | Age: 53
End: 2023-03-31
Payer: COMMERCIAL

## 2023-03-31 VITALS
HEIGHT: 71 IN | WEIGHT: 185 LBS | BODY MASS INDEX: 25.9 KG/M2 | HEART RATE: 75 BPM | SYSTOLIC BLOOD PRESSURE: 138 MMHG | TEMPERATURE: 98 F | RESPIRATION RATE: 18 BRPM | OXYGEN SATURATION: 98 % | DIASTOLIC BLOOD PRESSURE: 88 MMHG

## 2023-03-31 DIAGNOSIS — J02.9 SORE THROAT: Primary | ICD-10-CM

## 2023-03-31 DIAGNOSIS — J06.9 VIRAL URI: ICD-10-CM

## 2023-03-31 DIAGNOSIS — Z11.52 ENCOUNTER FOR SCREENING FOR COVID-19: ICD-10-CM

## 2023-03-31 LAB
CONTROL LINE PRESENT WITH A CLEAR BACKGROUND (YES/NO): YES YES/NO
KIT LOT #: NORMAL NUMERIC
STREP GRP A CUL-SCR: NEGATIVE

## 2023-03-31 PROCEDURE — 87880 STREP A ASSAY W/OPTIC: CPT | Performed by: NURSE PRACTITIONER

## 2023-03-31 PROCEDURE — 3075F SYST BP GE 130 - 139MM HG: CPT | Performed by: NURSE PRACTITIONER

## 2023-03-31 PROCEDURE — 3079F DIAST BP 80-89 MM HG: CPT | Performed by: NURSE PRACTITIONER

## 2023-03-31 PROCEDURE — 3008F BODY MASS INDEX DOCD: CPT | Performed by: NURSE PRACTITIONER

## 2023-03-31 PROCEDURE — 99213 OFFICE O/P EST LOW 20 MIN: CPT | Performed by: NURSE PRACTITIONER

## 2023-03-31 NOTE — PATIENT INSTRUCTIONS
Self care for viral illnesses:    Salt water gargles (1 tsp. Salt in 6 oz lukewarm water), use several times daily to help remove post nasal drainage and soothe throat. Saline nasal spray such as Ocean or Simply Saline to nostrils 2-3 times daily to help soothe tissues and keep mucus thin. Rest up! Hydrate (cold or hot based on comfort). Drink lots of water or other non dehydrating liquids to help with illness. May use humidifier in bedroom at night to help with congestion. Hot steamy showers can loosen congestion and help cough. John's vapor rub to chest can quiet cough. There is a low threshold to use your rescue inhaler. Use if wheezing occurs. Rinse mouth after use. Hand washing-use hand  or wash hands frequently, cover your cough or sneeze, do not share towels or drinks with others. May take over the counter multi symptom medication such as Nyquil or Dayquil as directed on package if needed. May use Tylenol or Ibuprofen over the counter for pain/comfort if not included in multi symptom medication. No work or school until fever free for 24 hours. Follow up in 10-14 days after illness started with primary care if symptoms not complete resolved or sooner if worsening symptoms. Seek immediate care if inability to swallow or breathe or if symptoms improve greatly then worsen again.

## 2023-04-01 ENCOUNTER — PATIENT MESSAGE (OUTPATIENT)
Dept: FAMILY MEDICINE CLINIC | Facility: CLINIC | Age: 53
End: 2023-04-01

## 2023-04-01 LAB — SARS-COV-2 RNA RESP QL NAA+PROBE: DETECTED

## 2023-04-03 NOTE — TELEPHONE ENCOUNTER
Spoke to patient. Recommendations provided. He will return to the lab in a couple weeks for hepatic profile as well.

## 2023-04-03 NOTE — TELEPHONE ENCOUNTER
From: Liya Nieto  To: Ajay Corcoran PA-C  Sent: 4/1/2023 2:20 PM CDT  Subject: Question regarding SARS-COV-22    I have tested positive for COVID. What should I do now?

## 2023-05-22 DIAGNOSIS — J45.40 MODERATE PERSISTENT ASTHMA WITHOUT COMPLICATION: ICD-10-CM

## 2023-05-23 RX ORDER — MONTELUKAST SODIUM 10 MG/1
10 TABLET ORAL NIGHTLY
Qty: 90 TABLET | Refills: 3 | OUTPATIENT
Start: 2023-05-23

## 2023-05-31 DIAGNOSIS — J45.40 MODERATE PERSISTENT ASTHMA WITHOUT COMPLICATION: ICD-10-CM

## 2023-06-02 ENCOUNTER — PATIENT MESSAGE (OUTPATIENT)
Dept: FAMILY MEDICINE CLINIC | Facility: CLINIC | Age: 53
End: 2023-06-02

## 2023-06-05 ENCOUNTER — TELEPHONE (OUTPATIENT)
Dept: FAMILY MEDICINE CLINIC | Facility: CLINIC | Age: 53
End: 2023-06-05

## 2023-06-05 ENCOUNTER — LAB ENCOUNTER (OUTPATIENT)
Dept: LAB | Age: 53
End: 2023-06-05
Attending: FAMILY MEDICINE
Payer: COMMERCIAL

## 2023-06-05 DIAGNOSIS — R74.8 ELEVATED LIVER ENZYMES: ICD-10-CM

## 2023-06-05 LAB
ALBUMIN SERPL-MCNC: 3.4 G/DL (ref 3.4–5)
ALP LIVER SERPL-CCNC: 73 U/L
ALT SERPL-CCNC: 64 U/L
AST SERPL-CCNC: 63 U/L (ref 15–37)
BILIRUB DIRECT SERPL-MCNC: 0.2 MG/DL (ref 0–0.2)
BILIRUB SERPL-MCNC: 1.1 MG/DL (ref 0.1–2)
PROT SERPL-MCNC: 7.4 G/DL (ref 6.4–8.2)

## 2023-06-05 PROCEDURE — 80076 HEPATIC FUNCTION PANEL: CPT

## 2023-06-05 PROCEDURE — 82977 ASSAY OF GGT: CPT | Performed by: FAMILY MEDICINE

## 2023-06-05 PROCEDURE — 83550 IRON BINDING TEST: CPT | Performed by: FAMILY MEDICINE

## 2023-06-05 PROCEDURE — 36415 COLL VENOUS BLD VENIPUNCTURE: CPT

## 2023-06-05 PROCEDURE — 83540 ASSAY OF IRON: CPT | Performed by: FAMILY MEDICINE

## 2023-06-05 PROCEDURE — 82728 ASSAY OF FERRITIN: CPT | Performed by: FAMILY MEDICINE

## 2023-06-05 RX ORDER — MONTELUKAST SODIUM 10 MG/1
10 TABLET ORAL NIGHTLY
Qty: 30 TABLET | Refills: 0 | Status: SHIPPED | OUTPATIENT
Start: 2023-06-05

## 2023-06-06 DIAGNOSIS — R79.89 ELEVATED LIVER FUNCTION TESTS: Primary | ICD-10-CM

## 2023-06-06 LAB
DEPRECATED HBV CORE AB SER IA-ACNC: 271.3 NG/ML
GGT SERPL-CCNC: 233 U/L
IRON SATN MFR SERPL: 29 %
IRON SERPL-MCNC: 78 UG/DL
TIBC SERPL-MCNC: 270 UG/DL (ref 240–450)
TRANSFERRIN SERPL-MCNC: 181 MG/DL (ref 200–360)

## 2023-06-06 NOTE — PROGRESS NOTES
GGT elevated another one of the liver function tests usually associated with alcohol we will discuss at your follow-up visit avoid alcohol use. Iron levels are normal no signs of iron storage  liver disease.

## 2023-06-06 NOTE — PROGRESS NOTES
Liver function tests are still elevated  Placing orders to check for multiple tests to check for causes that would lead to elevated liver function tests.   Try to avoid processed foods, alcohol and acetaminophen

## 2023-06-06 NOTE — TELEPHONE ENCOUNTER
From: Radha Lauren  To: Yesika Christopher PA-C  Sent: 6/2/2023 3:22 PM CDT  Subject: Refill     Hi. I tried to refill my Montelukast 10 mg tabs and the pharmacy said I needed to contact my doctor. Can this be refilled or?     Thanks,  [employer]

## 2023-06-21 ENCOUNTER — OFFICE VISIT (OUTPATIENT)
Dept: FAMILY MEDICINE CLINIC | Facility: CLINIC | Age: 53
End: 2023-06-21
Payer: COMMERCIAL

## 2023-06-21 ENCOUNTER — LAB ENCOUNTER (OUTPATIENT)
Dept: LAB | Age: 53
End: 2023-06-21
Attending: FAMILY MEDICINE
Payer: COMMERCIAL

## 2023-06-21 VITALS
DIASTOLIC BLOOD PRESSURE: 74 MMHG | RESPIRATION RATE: 18 BRPM | OXYGEN SATURATION: 98 % | SYSTOLIC BLOOD PRESSURE: 136 MMHG | HEIGHT: 70 IN | HEART RATE: 72 BPM | TEMPERATURE: 98 F | BODY MASS INDEX: 25.34 KG/M2 | WEIGHT: 177 LBS

## 2023-06-21 DIAGNOSIS — J45.40 MODERATE PERSISTENT ASTHMA WITHOUT COMPLICATION: Primary | ICD-10-CM

## 2023-06-21 DIAGNOSIS — R79.89 ELEVATED LFTS: Primary | ICD-10-CM

## 2023-06-21 DIAGNOSIS — Z23 NEED FOR VACCINATION: ICD-10-CM

## 2023-06-21 DIAGNOSIS — F41.1 GAD (GENERALIZED ANXIETY DISORDER): ICD-10-CM

## 2023-06-21 DIAGNOSIS — Z78.9 USES ALCOHOL MODERATELY: ICD-10-CM

## 2023-06-21 DIAGNOSIS — Z79.899 MEDICATION MANAGEMENT: ICD-10-CM

## 2023-06-21 DIAGNOSIS — R74.8 ELEVATED SERUM GGT LEVEL: ICD-10-CM

## 2023-06-21 DIAGNOSIS — R79.89 ELEVATED LFTS: ICD-10-CM

## 2023-06-21 LAB
A1AT SERPL-MCNC: 100 MG/DL (ref 90–200)
CERULOPLASMIN SERPL-MCNC: 16.3 MG/DL (ref 20–60)
HAV AB SER QL IA: NONREACTIVE
HBV SURFACE AG SER-ACNC: <0.1 [IU]/L
HBV SURFACE AG SERPL QL IA: NONREACTIVE
HCV AB SERPL QL IA: NONREACTIVE
INR BLD: 1.02 (ref 0.85–1.16)
PROTHROMBIN TIME: 13.4 SECONDS (ref 11.6–14.8)

## 2023-06-21 PROCEDURE — 83516 IMMUNOASSAY NONANTIBODY: CPT

## 2023-06-21 PROCEDURE — 90471 IMMUNIZATION ADMIN: CPT | Performed by: FAMILY MEDICINE

## 2023-06-21 PROCEDURE — 3078F DIAST BP <80 MM HG: CPT | Performed by: FAMILY MEDICINE

## 2023-06-21 PROCEDURE — 3008F BODY MASS INDEX DOCD: CPT | Performed by: FAMILY MEDICINE

## 2023-06-21 PROCEDURE — 82103 ALPHA-1-ANTITRYPSIN TOTAL: CPT

## 2023-06-21 PROCEDURE — 99214 OFFICE O/P EST MOD 30 MIN: CPT | Performed by: FAMILY MEDICINE

## 2023-06-21 PROCEDURE — 90677 PCV20 VACCINE IM: CPT | Performed by: FAMILY MEDICINE

## 2023-06-21 PROCEDURE — 3075F SYST BP GE 130 - 139MM HG: CPT | Performed by: FAMILY MEDICINE

## 2023-06-21 RX ORDER — MONTELUKAST SODIUM 10 MG/1
10 TABLET ORAL NIGHTLY
Qty: 90 TABLET | Refills: 3 | Status: SHIPPED | OUTPATIENT
Start: 2023-06-21

## 2023-06-21 RX ORDER — BUSPIRONE HYDROCHLORIDE 15 MG/1
7.5 TABLET ORAL 2 TIMES DAILY WITH MEALS
Qty: 90 TABLET | Refills: 1 | Status: SHIPPED | OUTPATIENT
Start: 2023-06-21

## 2023-06-22 DIAGNOSIS — E83.00 LOW CERULOPLASMIN LEVEL: Primary | ICD-10-CM

## 2023-06-22 DIAGNOSIS — R79.89 ELEVATED LIVER FUNCTION TESTS: ICD-10-CM

## 2023-06-22 LAB — TTG IGA SER-ACNC: 1.5 U/ML (ref ?–7)

## 2023-06-23 LAB
ACTIN SMOOTH MUSCLE AB: 12 UNITS
M2 MITOCHONDRIAL AB: <20 UNITS

## 2023-06-24 ENCOUNTER — LAB ENCOUNTER (OUTPATIENT)
Dept: LAB | Age: 53
End: 2023-06-24
Attending: FAMILY MEDICINE
Payer: COMMERCIAL

## 2023-06-24 DIAGNOSIS — E83.00 LOW CERULOPLASMIN LEVEL: ICD-10-CM

## 2023-06-24 PROCEDURE — 82525 ASSAY OF COPPER: CPT

## 2023-06-24 PROCEDURE — 36415 COLL VENOUS BLD VENIPUNCTURE: CPT

## 2023-06-30 LAB — COPPER: 67 UG/DL

## 2023-07-01 ENCOUNTER — HOSPITAL ENCOUNTER (OUTPATIENT)
Dept: CT IMAGING | Age: 53
Discharge: HOME | End: 2023-07-01
Attending: FAMILY MEDICINE

## 2023-07-01 DIAGNOSIS — Z13.6 SCREENING FOR HEART DISEASE: ICD-10-CM

## 2023-07-01 DIAGNOSIS — R79.0 LOW SERUM COPPER FOR AGE: Primary | ICD-10-CM

## 2023-07-01 DIAGNOSIS — E83.00 LOW CERULOPLASMIN LEVEL: ICD-10-CM

## 2023-07-01 DIAGNOSIS — R79.89 ELEVATED LFTS: ICD-10-CM

## 2023-07-18 PROBLEM — K26.3 ACUTE DUODENAL ULCER WITHOUT HEMORRHAGE OR PERFORATION: Status: ACTIVE | Noted: 2023-07-18

## 2023-07-28 ENCOUNTER — PATIENT MESSAGE (OUTPATIENT)
Dept: FAMILY MEDICINE CLINIC | Facility: CLINIC | Age: 53
End: 2023-07-28

## 2023-07-29 ENCOUNTER — LAB ENCOUNTER (OUTPATIENT)
Dept: LAB | Age: 53
End: 2023-07-29
Payer: COMMERCIAL

## 2023-07-29 DIAGNOSIS — R79.0 LOW SERUM COPPER LEVEL: ICD-10-CM

## 2023-07-29 DIAGNOSIS — E83.00 LOW CERULOPLASMIN LEVEL: ICD-10-CM

## 2023-07-29 DIAGNOSIS — R74.8 ELEVATED LIVER ENZYMES: ICD-10-CM

## 2023-07-29 LAB
ALBUMIN SERPL-MCNC: 3.5 G/DL (ref 3.4–5)
ALBUMIN/GLOB SERPL: 1 {RATIO} (ref 1–2)
ALP LIVER SERPL-CCNC: 66 U/L
ALT SERPL-CCNC: 57 U/L
ANION GAP SERPL CALC-SCNC: 4 MMOL/L (ref 0–18)
AST SERPL-CCNC: 49 U/L (ref 15–37)
BILIRUB SERPL-MCNC: 1.8 MG/DL (ref 0.1–2)
BUN BLD-MCNC: 15 MG/DL (ref 7–18)
CALCIUM BLD-MCNC: 8.6 MG/DL (ref 8.5–10.1)
CHLORIDE SERPL-SCNC: 107 MMOL/L (ref 98–112)
CO2 SERPL-SCNC: 27 MMOL/L (ref 21–32)
CREAT BLD-MCNC: 1.45 MG/DL
EGFRCR SERPLBLD CKD-EPI 2021: 58 ML/MIN/1.73M2 (ref 60–?)
FASTING STATUS PATIENT QL REPORTED: NO
GLOBULIN PLAS-MCNC: 3.6 G/DL (ref 2.8–4.4)
GLUCOSE BLD-MCNC: 96 MG/DL (ref 70–99)
HBV CORE AB SERPL QL IA: NONREACTIVE
OSMOLALITY SERPL CALC.SUM OF ELEC: 287 MOSM/KG (ref 275–295)
POTASSIUM SERPL-SCNC: 3.9 MMOL/L (ref 3.5–5.1)
PROT SERPL-MCNC: 7.1 G/DL (ref 6.4–8.2)
SODIUM SERPL-SCNC: 138 MMOL/L (ref 136–145)

## 2023-07-29 PROCEDURE — 86039 ANTINUCLEAR ANTIBODIES (ANA): CPT

## 2023-07-29 PROCEDURE — 86704 HEP B CORE ANTIBODY TOTAL: CPT

## 2023-07-29 PROCEDURE — 36415 COLL VENOUS BLD VENIPUNCTURE: CPT

## 2023-07-29 PROCEDURE — 86038 ANTINUCLEAR ANTIBODIES: CPT

## 2023-07-29 PROCEDURE — 80053 COMPREHEN METABOLIC PANEL: CPT

## 2023-07-29 NOTE — TELEPHONE ENCOUNTER
From: Angelia Saavedra  To: Amanda Newman PA-C  Sent: 7/28/2023 8:57 AM CDT  Subject: Dr Art Calderon morning. I have made an appointment with Dr Bessy Ruiz for 8.22.23 in [place] (the 1st available appointment). They asked if you could send a referral for me to their fax number [phone].       Thanks,  [employer]

## 2023-07-29 NOTE — TELEPHONE ENCOUNTER
Referral was placed 7/1/23 please fax the referral for Dr. Jj Houston [phone] per patient's request

## 2023-07-31 LAB — NUCLEAR IGG TITR SER IF: POSITIVE {TITER}

## 2023-08-01 LAB — ANA NUCLEOLAR TITR SER IF: 160 {TITER}

## 2023-08-03 ENCOUNTER — PATIENT MESSAGE (OUTPATIENT)
Dept: FAMILY MEDICINE CLINIC | Facility: CLINIC | Age: 53
End: 2023-08-03

## 2023-08-03 DIAGNOSIS — R76.8 POSITIVE ANA (ANTINUCLEAR ANTIBODY): Primary | ICD-10-CM

## 2023-08-03 NOTE — TELEPHONE ENCOUNTER
From: Mai Lopez  To: Glory Mroales PA-C  Sent: 8/3/2023 10:14 AM CDT  Subject: Blood work    Good morning. Lyssa Burleson [facility] has forwarded some test results from 7.29.23 to your office. Can you please advise me if there is anything new I need to knw? I have an appointment set for 8.22.23 with Dr Ann Sarabia but Lyssa Burleson said I should also speak with you about the results in case further tests are required prior to my visit with Dr Ann Sarabia.     Thanks,   [employer]

## 2023-09-24 ENCOUNTER — PATIENT MESSAGE (OUTPATIENT)
Dept: FAMILY MEDICINE CLINIC | Facility: CLINIC | Age: 53
End: 2023-09-24

## 2023-09-24 RX ORDER — PANTOPRAZOLE SODIUM 40 MG/1
40 TABLET, DELAYED RELEASE ORAL
Qty: 90 TABLET | Refills: 3 | Status: SHIPPED | OUTPATIENT
Start: 2023-09-24

## 2023-09-24 RX ORDER — PANTOPRAZOLE SODIUM 40 MG/1
TABLET, DELAYED RELEASE ORAL
Refills: 0 | Status: CANCELLED | OUTPATIENT
Start: 2023-09-24

## 2023-09-24 NOTE — TELEPHONE ENCOUNTER
From: Radha Lauren  To: Yesika Christopher  Sent: 9/24/2023 1:52 PM CDT  Subject: pantoprazole    Hello. Can I get a refill ASAP as I am completely out. I ordered from my pharmacy last week while on vacation and it has not been refilled yet. I took my last one today.      Thanks,  [employer]

## 2023-10-04 ENCOUNTER — HOSPITAL ENCOUNTER (OUTPATIENT)
Dept: ULTRASOUND IMAGING | Age: 53
Discharge: HOME | End: 2023-10-04
Payer: COMMERCIAL

## 2023-10-04 DIAGNOSIS — R74.8 ELEVATED LIVER ENZYMES: ICD-10-CM

## 2023-10-04 DIAGNOSIS — R79.0 LOW SERUM COPPER LEVEL: ICD-10-CM

## 2023-10-04 DIAGNOSIS — E83.00 LOW CERULOPLASMIN LEVEL: ICD-10-CM

## 2023-10-04 PROCEDURE — 76700 US EXAM ABDOM COMPLETE: CPT

## 2023-10-11 ENCOUNTER — OFFICE VISIT (OUTPATIENT)
Dept: FAMILY MEDICINE CLINIC | Facility: CLINIC | Age: 53
End: 2023-10-11
Payer: COMMERCIAL

## 2023-10-11 VITALS
BODY MASS INDEX: 25.06 KG/M2 | HEIGHT: 70.67 IN | TEMPERATURE: 98 F | OXYGEN SATURATION: 97 % | SYSTOLIC BLOOD PRESSURE: 122 MMHG | HEART RATE: 68 BPM | DIASTOLIC BLOOD PRESSURE: 80 MMHG | WEIGHT: 179 LBS

## 2023-10-11 DIAGNOSIS — Z13.0 SCREENING FOR ENDOCRINE, NUTRITIONAL, METABOLIC AND IMMUNITY DISORDER: ICD-10-CM

## 2023-10-11 DIAGNOSIS — Z13.228 SCREENING FOR ENDOCRINE, NUTRITIONAL, METABOLIC AND IMMUNITY DISORDER: ICD-10-CM

## 2023-10-11 DIAGNOSIS — Z00.00 WELL ADULT EXAM: Primary | ICD-10-CM

## 2023-10-11 DIAGNOSIS — Z13.21 SCREENING FOR ENDOCRINE, NUTRITIONAL, METABOLIC AND IMMUNITY DISORDER: ICD-10-CM

## 2023-10-11 DIAGNOSIS — Z12.5 ENCOUNTER FOR SCREENING FOR MALIGNANT NEOPLASM OF PROSTATE: ICD-10-CM

## 2023-10-11 DIAGNOSIS — Z13.6 ENCOUNTER FOR LIPID SCREENING FOR CARDIOVASCULAR DISEASE: ICD-10-CM

## 2023-10-11 DIAGNOSIS — D22.9 MULTIPLE PIGMENTED NEVI: ICD-10-CM

## 2023-10-11 DIAGNOSIS — Z12.2 SCREENING FOR LUNG CANCER: ICD-10-CM

## 2023-10-11 DIAGNOSIS — Z13.220 ENCOUNTER FOR LIPID SCREENING FOR CARDIOVASCULAR DISEASE: ICD-10-CM

## 2023-10-11 DIAGNOSIS — Z13.29 SCREENING FOR ENDOCRINE, NUTRITIONAL, METABOLIC AND IMMUNITY DISORDER: ICD-10-CM

## 2023-10-11 DIAGNOSIS — J45.40 MODERATE PERSISTENT ASTHMA WITHOUT COMPLICATION: ICD-10-CM

## 2023-10-11 DIAGNOSIS — Z87.891 HISTORY OF PRIOR CIGARETTE SMOKING: ICD-10-CM

## 2023-10-11 PROCEDURE — 99212 OFFICE O/P EST SF 10 MIN: CPT | Performed by: FAMILY MEDICINE

## 2023-10-11 PROCEDURE — 90471 IMMUNIZATION ADMIN: CPT | Performed by: FAMILY MEDICINE

## 2023-10-11 PROCEDURE — 99396 PREV VISIT EST AGE 40-64: CPT | Performed by: FAMILY MEDICINE

## 2023-10-11 PROCEDURE — 3074F SYST BP LT 130 MM HG: CPT | Performed by: FAMILY MEDICINE

## 2023-10-11 PROCEDURE — 3079F DIAST BP 80-89 MM HG: CPT | Performed by: FAMILY MEDICINE

## 2023-10-11 PROCEDURE — 90686 IIV4 VACC NO PRSV 0.5 ML IM: CPT | Performed by: FAMILY MEDICINE

## 2023-10-11 PROCEDURE — 3008F BODY MASS INDEX DOCD: CPT | Performed by: FAMILY MEDICINE

## 2023-10-11 RX ORDER — FLUTICASONE PROPIONATE AND SALMETEROL 250; 50 UG/1; UG/1
1 POWDER RESPIRATORY (INHALATION) EVERY 12 HOURS SCHEDULED
Qty: 3 EACH | Refills: 1 | Status: SHIPPED | OUTPATIENT
Start: 2023-10-11

## 2023-10-11 NOTE — PROGRESS NOTES
Monie Garcia is a 48year old male who presents for a complete physical exam.   HPI:     Asthma /prior smoker  Patient needs refill on Advair has multiple seasonal and indoor allergies uses albuterol rarely ACT is at a 24 Advair 250/50 mcg dose 1 puff once a day does not usually need the second puff. Singulair 10 mg and Zyrtec daily. CT calcium over read 2023    no nodules previous ground glass nodules resolved  Quit smoking    Packs/day: 1.50        Years: 20.00        Pack years: 30        Types: Cigarettes        Start date: 1989        Quit date: 4/3/2009        Years since quittin.4  ACT is at a 24  Advair HFA presently using 1 inhale bid never needs to  increase to 2 puffs bid daily. Patient has multiple seasonal allergies. PCV  20 UTD  Pneumonia 2022 had CT chest history of right upper lobe abscess and surgery years ago  Has not seen a pulmonologist gets no shortness of breath or cough. Patient is wanting to do CT lung screening for cancer      MACK  Much better   Home life is good. Patient discontinued BuSpar since job is going better    Gandara's esophagitis  Taking pantoprazole 40 mg Gandara's esophagitis diagnosed 2022  Taking pantoprazole with no side effects and no GERD symptoms. Endoscopy done  23 by Dr. Gaston Hull repeat due in 3 year   Final Diagnosis: -Positive for Gandara's specialized intestinal metaplasia.      --Fatty liver  Has appointment with hepatologist low ceruloplasmin  Liver ultrasound showed fatty liver   Alcohol intake is moderate 1-2 nightly and 12 on the weekends beer    CPE  Flu vaccine is today at the office  COVID vaccine x3 will look into booster  Shingrix  vaccine completed  Pneumovax 23 done 2022 PCV 20 2023   Tdap 2016 Ultrafast CT of the heart 43.87 presently is not on a statin waiting for liver specialist due to elevated liver function test and fatty liver  Dental exams did do exam after 20 years is now every 6 months    Eye exams UTD   PHQ9  No depression  Sleep Apnea  none witnessed by wife no snoring  Exercise Doing weights  1. .5 hour 3 times a week  free weights. Floor routine aerobic   DIet  healthy low processed foods avoids eating out and no soda  FH CAD or cancer  No FH coronary disease or cancer denies family history stroke  No dermatology visits not done no family history of personal history of skin cancer no skin changes  Last colonoscopy completed and due  5/24/31. No FH colon cancer. No bowel movement issues including no blood or mucus. Urination changes none. Nocturia x1. No urgency or dribbling. No FH prostate cancer. PSA completed 2/15/2023 1.4  ED symptoms none. Results for orders placed or performed in visit on 07/29/23   Anti-Nuclear Antibody (STEPHY) by IFA Screen, Reflex Titer   Result Value Ref Range    STEPHY IFA Screen Positive (A) Negative   Hep B Core AB, Tot   Result Value Ref Range    Hepatitis B Core Ab,Total Nonreactive Nonreactive    CMP (30624)   Result Value Ref Range    Glucose 96 70 - 99 mg/dL    Sodium 138 136 - 145 mmol/L    Potassium 3.9 3.5 - 5.1 mmol/L    Chloride 107 98 - 112 mmol/L    CO2 27.0 21.0 - 32.0 mmol/L    Anion Gap 4 0 - 18 mmol/L    BUN 15 7 - 18 mg/dL    Creatinine 1.45 (H) 0.70 - 1.30 mg/dL    Calcium, Total 8.6 8.5 - 10.1 mg/dL    Calculated Osmolality 287 275 - 295 mOsm/kg    eGFR-Cr 58 (L) >=60 mL/min/1.73m2    AST 49 (H) 15 - 37 U/L    ALT 57 16 - 61 U/L    Alkaline Phosphatase 66 45 - 117 U/L    Bilirubin, Total 1.8 0.1 - 2.0 mg/dL    Total Protein 7.1 6.4 - 8.2 g/dL    Albumin 3.5 3.4 - 5.0 g/dL    Globulin  3.6 2.8 - 4.4 g/dL    A/G Ratio 1.0 1.0 - 2.0    Patient Fasting for CMP? No    STEPHY Titer/Pattern   Result Value Ref Range    STEPHY Titer/Pattern 160 (A) <80    Reviewed By: Anai Mason M.D.             Wt Readings from Last 6 Encounters:  07/25/23 : 175 lb 3.2 oz (79.5 kg)  06/21/23 : 177 lb (80.3 kg)  06/22/23 : 177 lb (80.3 kg)  03/31/23 : 185 lb (83.9 kg)  09/16/22 : 176 lb (79.8 kg)  07/18/22 : 180 lb (81.6 kg)    There is no height or weight on file to calculate BMI. Current Outpatient Medications   Medication Sig Dispense Refill    pantoprazole 40 MG Oral Tab EC Take 1 tablet (40 mg total) by mouth every morning before breakfast. 90 tablet 3    montelukast 10 MG Oral Tab Take 1 tablet (10 mg total) by mouth nightly. 90 tablet 3    busPIRone 15 MG Oral Tab Take 0.5 tablets (7.5 mg total) by mouth 2 (two) times daily with meals. 90 tablet 1    fluticasone-salmeterol 250-50 MCG/ACT Inhalation Aerosol Powder, Breath Activated Inhale 1 puff into the lungs every 12 (twelve) hours. Rinse mouth after use 1 each 5    albuterol (VENTOLIN HFA) 108 (90 Base) MCG/ACT Inhalation Aero Soln Inhale 2 puffs into the lungs every 4 to 6 hours as needed for Wheezing or Shortness of Breath. 1 each 1    Cholecalciferol (VITAMIN D3) 25 MCG (1000 UT) Oral Cap Take 1 capsule by mouth daily. Psyllium (METAMUCIL FIBER OR) Take 3 capsules by mouth daily. Cetirizine HCl (ZYRTEC OR) Take 1 tablet by mouth daily.         Past Medical History:   Diagnosis Date    Abdominal pain     Abscess of lung with pneumonia ([facility] [address] 04/2009    [facility]    Asthma     Blood in the stool     Chronic rhinitis     seasonal    Diverticulitis 12/15/2020    Extrinsic asthma, unspecified      mainly issue during allergy season, well controlled    Night sweats 09/29/20      Past Surgical History:   Procedure Laterality Date    COLONOSCOPY      OTHER SURGICAL HISTORY  2009    lung abscess removal      Family History   Problem Relation Age of Onset    Musculo-skelatal Disorder Father         back issues    Lipids Father     Musculo-skelatal Disorder Mother         hip replacement    No Known Problems Sister     Asthma Daughter       Social History:  Social History    Tobacco Use      Smoking status: Former Smoker        Packs/day: 1.50        Years: 20.00        Pack years: 27 Types: Cigarettes        Start date: 1989        Quit date: 4/3/2009        Years since quittin.4      Smokeless tobacco: Former User        Types: [address] [facility] [address] 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 Children: 1 daughter 26 y/o. Exercise:  4 aerobic running and weights times per week. Diet: watches fats closely and watches sugar closely     REVIEW OF SYSTEMS:   GENERAL: feels well overall, denies fever or chills, denies change in weight  SKIN: No complaints  EYES: denies changes in vision  HENT: denies upper respiratory symptoms  LUNGS: denies POOJA, wheezing, cough, orthopnea and PND  CARDIOVASCULAR: denies CP, palpitations, rapid or slow heart rate. Denies CAST/lower extremity swelling  GI: denies abdominal pain,denies heartburn, denies n/v/c/d/change in stools/blood in stool/black stool/change in appetite Gandara's esophagitis asymptomatic with pantoprazole  : denies nocturia or changes in urinary stream, denies scrotal mass/abnormal discharge from urethra  MUSCULOSKELETAL: denies joint or muscle aches or pains  NEURO: denies headaches/dizziness  PSYCH: denies depression or anxiety  HEMATOLOGIC: denies easy bruising/bleeding/anemia/blood clot disorders  ENDOCRINE: denies weight gain/weight loss/enlargement of neck glands/polyuria/polydypsia  ALL/ASTHMA: allergic rhinitis/asthma    EXAM:   There were no vitals taken for this visit. There is no height or weight on file to calculate BMI.    GENERAL: NAD, pleasant, well developed, normal voice  SKIN: no rashes, no suspicious moles or lesions   HENT: NCAT, EACs clear b/l, TMs normal b/l, nasal turbinatess normal b/l, oropharynx with mmm/clear/normal, gingiva normal, lips normal  EYES: PERRLA, EOMI, conjunctiva non-injected and non-icteric  NECK: supple, no adenopathy/thyromegaly/thyroid nodules/masses  CHEST: no chest tenderness  BREAST: no suspicious masses appreciated on palpation  LUNGS: CTA A/P, no wheezes/ronchi/rales/crackles, normal air excursion  CARDIOVASCULAR: RRR, no murmur, no lower extremity edema, pedal and femoral pulses 2+ and symmetric b/l  GI: normoactive BS, non-distended, non-tender to palpation, no HSM/masses/pulsations  : two descended testes, no scrotal masses, no hernia, no penile lesions  RECTAL: external anal sphincter appears normal, normal rectal tone, no masses,   Prostate: smooth, normal contours, NT, normal size. MUSCULOSKELETAL:  extremities x 4 with normal strength 5/5 and symmetric and with normal AROM/PROM. EXTREMITIES: no cyanosis, clubbing or edema  NEURO: A&O x3, CN II-XII grossly intact. Reflexes: biceps and patellar 2+ b/l and symmetric.  Gait is normal.  PSYCH: normal affect, no apparent thought disorder, average judgement and insight        Immunization History  Administered            Date(s) Administered    Covid-19 Vaccine [employer] (J[employer]J) 0.5ml                          05/29/2021 11/21/2021      Covid-19 Vaccine Pfizer Bivalent 30mcg/0.3mL                          09/26/2022      FLU VAC QIV SPLIT 3 YRS AND OLDER (75219)                          09/14/2019      FLUZONE 6 months and older PFS 0.5 ml (17506)                          09/01/2017 09/05/2018 09/05/2021      Flucelvax 0.5ml Vaccine                          09/21/2020 09/21/2020 09/16/2022      Fluvirin, 3 Years & >, Im                          09/10/2015      Influenza             09/01/2016 09/16/2022      Pneumococcal Conjugate PCV20                          06/21/2023      Pneumovax 23          09/01/2016 05/13/2022      TDAP                  04/01/2016      Zoster Vaccine Recombinant Adjuvanted (Shingrix)                          09/05/2021 12/05/2021      ASSESSMENT AND PLAN:   Shell Kitchen is a 48year old male who presents for a complete physical exam.   No diagnosis found. No orders of the defined types were placed in this encounter. Meds & Refills for this Visit:  Requested Prescriptions      No prescriptions requested or ordered in this encounter       Imaging & Consults:  None  1. Well adult exam  Recommend healthy diet including green leafy vegetables, fresh fruits and lean meats. Aerobic exercise 30 minutes five days a week for cardiovascular fitness and 45-60 minutes 6-7 days a week for weight loss. Advised testicular self exam once a month. 2. Multiple pigmented nevi  - DERM - INTERNAL    3. Moderate persistent asthma without complication  Reviewed medication benefits and side effects. AAP reviewed and approved. ACT 24  Continue with Singulair and over-the-counter Zyrtec  - fluticasone-salmeterol 250-50 MCG/ACT Inhalation Aerosol Powder, Breath Activated; Inhale 1 puff into the lungs every 12 (twelve) hours. Rinse mouth after use  Dispense: 3 each; Refill: 1    4. History of prior cigarette smoking  - CT LUNG LD SCREENING(CPT=71271); Future    5. Screening for lung cancer  - CT LUNG LD SCREENING(CPT=71271); Future    6. Screening for endocrine, nutritional, metabolic and immunity disorder  - CBC With Differential With Platelet; Future  - Comp Metabolic Panel (14); Future  - TSH W Reflex To Free T4; Future  - Lipid Panel; Future    7. Encounter for screening for malignant neoplasm of prostate  - PSA Total, Screen; Future    8. Encounter for lipid screening for cardiovascular disease  - Lipid Panel; Future  The patient verbalizes understanding of these recommendations and agrees to the plan.   The patient is asked to return for asthma every 6 months flu vaccine and had to pharmacy

## 2023-11-11 DIAGNOSIS — J45.40 MODERATE PERSISTENT ASTHMA WITHOUT COMPLICATION: ICD-10-CM

## 2023-11-13 ENCOUNTER — OFFICE VISIT (OUTPATIENT)
Dept: SURGERY | Facility: CLINIC | Age: 53
End: 2023-11-13

## 2023-11-13 VITALS
BODY MASS INDEX: 25 KG/M2 | OXYGEN SATURATION: 99 % | RESPIRATION RATE: 16 BRPM | DIASTOLIC BLOOD PRESSURE: 104 MMHG | WEIGHT: 178.63 LBS | TEMPERATURE: 98 F | SYSTOLIC BLOOD PRESSURE: 144 MMHG | HEART RATE: 85 BPM

## 2023-11-13 DIAGNOSIS — R79.89 ELEVATED LFTS: Primary | ICD-10-CM

## 2023-11-14 RX ORDER — ALBUTEROL SULFATE 90 UG/1
2 AEROSOL, METERED RESPIRATORY (INHALATION)
Qty: 18 G | Refills: 0 | Status: SHIPPED | OUTPATIENT
Start: 2023-11-14

## 2023-11-14 NOTE — TELEPHONE ENCOUNTER
Requested Prescriptions     Signed Prescriptions Disp Refills    VENTOLIN  (90 Base) MCG/ACT Inhalation Aero Soln 18 g 0     Sig: INHALE 2 PUFFS BY MOUTH EVERY 4 TO 6 HOURS AS NEEDED FOR WHEEZING FOR SHORTNESS OF BREATH     Authorizing Provider: Deo Harris     Ordering User: Barbara Morgan     Met protocol. Refill sent.

## 2023-11-16 ENCOUNTER — PATIENT MESSAGE (OUTPATIENT)
Dept: FAMILY MEDICINE CLINIC | Facility: CLINIC | Age: 53
End: 2023-11-16

## 2023-11-16 RX ORDER — ALBUTEROL SULFATE 90 UG/1
2 AEROSOL, METERED RESPIRATORY (INHALATION) EVERY 6 HOURS PRN
Qty: 1 EACH | Refills: 1 | Status: SHIPPED | OUTPATIENT
Start: 2023-11-16

## 2023-11-16 NOTE — TELEPHONE ENCOUNTER
From: May President  To: Ravi Garcia  Sent: 11/16/2023 10:18 AM CST  Subject: Ventolin Inhaler    Good morning. I just spoke with the [address] and they said the brand name Ventolin is no longer covered by my insurance. Can you send over a request for whatever the generic equivalent is?     Thanks,  [employer]

## 2024-02-05 ENCOUNTER — PATIENT MESSAGE (OUTPATIENT)
Dept: FAMILY MEDICINE CLINIC | Facility: CLINIC | Age: 54
End: 2024-02-05

## 2024-02-05 ENCOUNTER — OFFICE VISIT (OUTPATIENT)
Dept: FAMILY MEDICINE CLINIC | Facility: CLINIC | Age: 54
End: 2024-02-05
Payer: COMMERCIAL

## 2024-02-05 VITALS
DIASTOLIC BLOOD PRESSURE: 84 MMHG | WEIGHT: 182 LBS | SYSTOLIC BLOOD PRESSURE: 122 MMHG | HEART RATE: 71 BPM | OXYGEN SATURATION: 98 % | BODY MASS INDEX: 25.48 KG/M2 | HEIGHT: 70.67 IN | RESPIRATION RATE: 16 BRPM | TEMPERATURE: 98 F

## 2024-02-05 DIAGNOSIS — H61.23 BILATERAL IMPACTED CERUMEN: Primary | ICD-10-CM

## 2024-02-05 PROCEDURE — 99212 OFFICE O/P EST SF 10 MIN: CPT | Performed by: NURSE PRACTITIONER

## 2024-02-05 NOTE — PROGRESS NOTES
CHIEF COMPLAINT:     Chief Complaint   Patient presents with    Ear Pain     R>L pain for 4 days, on/off ringing.  Ear drops used.         HPI:   Marshall Mesa is a 53 year old male who presents to clinic today with complaints of bilateral ear pressure, slight  ear pain and some ringing on and off to R ear. Has had for 3  days.  Reports used some wax softening drops, and slightly worsened. Pain is described as fullness/clogged and is located at inner ears.  Patient denies history of ear infections, states has had ear wax in the past.  Nothing makes ear pain worse, but none is palliative.  [facility]  Pt denies decreased hearing. Pt denies hearing loss. Pt denies drainage. Patient denies use of Q-tips to clean the ears. Pt denies nasal congestion. Pt denies recent allergy symptoms. Pt denies fever.    Current Outpatient Medications   Medication Sig Dispense Refill    albuterol 108 (90 Base) MCG/ACT Inhalation Aero Soln Inhale 2 puffs into the lungs every 6 (six) hours as needed for Wheezing (or cough). 1 each 1    VENTOLIN  (90 Base) MCG/ACT Inhalation Aero Soln INHALE 2 PUFFS BY MOUTH EVERY 4 TO 6 HOURS AS NEEDED FOR WHEEZING FOR SHORTNESS OF BREATH 18 g 0    pantoprazole 40 MG Oral Tab EC Take 1 tablet (40 mg total) by mouth every morning before breakfast. 90 tablet 3    montelukast 10 MG Oral Tab Take 1 tablet (10 mg total) by mouth nightly. 90 tablet 3    Cholecalciferol (VITAMIN D3) 25 MCG (1000 UT) Oral Cap Take 1 capsule by mouth daily.      Cetirizine HCl (ZYRTEC OR) Take 1 tablet by mouth daily.      fluticasone-salmeterol 250-50 MCG/ACT Inhalation Aerosol Powder, Breath Activated Inhale 1 puff into the lungs every 12 (twelve) hours. Rinse mouth after use (Patient not taking: Reported on 2/5/2024) 3 each 1    Psyllium (METAMUCIL FIBER OR) Take 3 capsules by mouth daily.        Past Medical History:   Diagnosis Date    Abdominal pain     Abscess of  lung with pneumonia (HCC) 2009    [facility]    Asthma     Blood in the stool     Chronic rhinitis     seasonal    Diverticulitis 12/15/2020    Extrinsic asthma, unspecified      mainly issue during allergy season, well controlled    Night sweats 20      Social History:  Social History     Socioeconomic History    Marital status:    Tobacco Use    Smoking status: Former     Packs/day: 1.50     Years: 20.00     Additional pack years: 0.00     Total pack years: 30.00     Types: Cigarettes     Start date: 1989     Quit date: 4/3/2009     Years since quittin.8    Smokeless tobacco: Former     Types: Chew     Quit date: 2009   Vaping Use    Vaping Use: Never used   Substance and Sexual Activity    Alcohol use: Yes     Alcohol/week: 12.0 standard drinks of alcohol     Types: 12 Cans of beer per week     Comment: 6-12 beers weekly    Drug use: No   Other Topics Concern    Caffeine Concern No    Occupational Exposure Yes     Comment: Dust, Fumes, Noise, Second-hand smoke, & Solvents    Sleep Concern Yes    Exercise Yes     Comment: 3-4 x weekly    Seat Belt Yes        REVIEW OF SYSTEMS:   GENERAL: Feeling well otherwise.  No chills.  No unexpected weight change.  SKIN: no unusual skin lesions or rashes  HEENT: See HPI  LUNGS: No cough, shortness of breath, or wheezing.  CARDIOVASCULAR: No chest pain, palpitations  GI: No N/V/C/D.  NEURO: denies headaches or dizziness    EXAM:   /84   Pulse 71   Temp 98 °F (36.7 °C) (Oral)   Resp 16   Ht 5' 10.67\" (1.795 m)   Wt 182 lb (82.6 kg)   SpO2 98%   BMI 25.62 kg/m²   GENERAL: well developed, well nourished,in no apparent distress  SKIN: no rashes,no suspicious lesions  HEAD: atraumatic, normocephalic  EYES: conjunctiva clear, EOM intact  EARS: Tragus non tender on palpation bilaterally. Bilateral canals with cerumen impaction. TM's not visualized. Distal canals  not erythematous/edematous.   NOSE: nostrils patent, no exudates,  nasal mucosa pink and noninflamed  THROAT: oral mucosa pink, moist. Posterior pharynx is not erythematous or injected. No exudates.  NECK: supple, non-tender      ASSESSMENT AND PLAN:   Marshall Mesa is a 53 year old male who presents with:    ASSESSMENT:  Encounter Diagnosis   Name Primary?    Bilateral impacted cerumen Yes       PLAN:  Offered pt. Irrigation, pt. Declines, and would like to continue to use home earwax removal kit.  Advised may try for several days in a row, and if no improvement can return here for irrigation if interested. ENT is only provider who would have vacuum assist for cerumen removal, WIC and PCP would both have irrigation. Meds as listed below. comfort measures as described in Patient Instructions.      Meds & Refills for this Visit:  Requested Prescriptions      No prescriptions requested or ordered in this encounter         Risk and benefits of medication discussed. Stressed importance of completing full course of antibiotic.     Tylenol/Motrin prn pain.      Call or return if s/sx worsen, do not improve in 3 days, or if fever of 100.4 or greater persists for 72 hours.    There are no Patient Instructions on file for this visit.      Patient voiced understand and is in agreement with treatment plan.

## 2024-02-05 NOTE — TELEPHONE ENCOUNTER
From: Marshall Mesa  To: Gilda Lopez  Sent: 2/5/2024 8:45 AM CST  Subject: Ears    Good morning. Is there any way I could come in today? I am having problems with my ears. I tried some over the counter ear drops but it made them worse. If not is this something I would need to go to emergency care for?    Thanks,  Marshall

## 2024-04-24 ENCOUNTER — PATIENT MESSAGE (OUTPATIENT)
Dept: FAMILY MEDICINE CLINIC | Facility: CLINIC | Age: 54
End: 2024-04-24

## 2024-04-24 ENCOUNTER — LAB ENCOUNTER (OUTPATIENT)
Dept: LAB | Age: 54
End: 2024-04-24
Attending: FAMILY MEDICINE
Payer: COMMERCIAL

## 2024-04-24 DIAGNOSIS — R79.89 ELEVATED TSH: Primary | ICD-10-CM

## 2024-04-24 DIAGNOSIS — Z13.29 SCREENING FOR ENDOCRINE, NUTRITIONAL, METABOLIC AND IMMUNITY DISORDER: ICD-10-CM

## 2024-04-24 DIAGNOSIS — Z13.220 ENCOUNTER FOR LIPID SCREENING FOR CARDIOVASCULAR DISEASE: ICD-10-CM

## 2024-04-24 DIAGNOSIS — Z13.6 ENCOUNTER FOR LIPID SCREENING FOR CARDIOVASCULAR DISEASE: ICD-10-CM

## 2024-04-24 DIAGNOSIS — Z12.5 ENCOUNTER FOR SCREENING FOR MALIGNANT NEOPLASM OF PROSTATE: ICD-10-CM

## 2024-04-24 DIAGNOSIS — Z13.21 SCREENING FOR ENDOCRINE, NUTRITIONAL, METABOLIC AND IMMUNITY DISORDER: ICD-10-CM

## 2024-04-24 DIAGNOSIS — Z13.0 SCREENING FOR ENDOCRINE, NUTRITIONAL, METABOLIC AND IMMUNITY DISORDER: ICD-10-CM

## 2024-04-24 DIAGNOSIS — Z13.228 SCREENING FOR ENDOCRINE, NUTRITIONAL, METABOLIC AND IMMUNITY DISORDER: ICD-10-CM

## 2024-04-24 LAB
ALBUMIN SERPL-MCNC: 3.5 G/DL (ref 3.4–5)
ALBUMIN/GLOB SERPL: 0.9 {RATIO} (ref 1–2)
ALP LIVER SERPL-CCNC: 69 U/L
ALT SERPL-CCNC: 49 U/L
ANION GAP SERPL CALC-SCNC: 5 MMOL/L (ref 0–18)
AST SERPL-CCNC: 35 U/L (ref 15–37)
BASOPHILS # BLD AUTO: 0.03 X10(3) UL (ref 0–0.2)
BASOPHILS NFR BLD AUTO: 0.5 %
BILIRUB SERPL-MCNC: 1.4 MG/DL (ref 0.1–2)
BUN BLD-MCNC: 15 MG/DL (ref 9–23)
CALCIUM BLD-MCNC: 9 MG/DL (ref 8.5–10.1)
CHLORIDE SERPL-SCNC: 105 MMOL/L (ref 98–112)
CHOLEST SERPL-MCNC: 200 MG/DL (ref ?–200)
CO2 SERPL-SCNC: 29 MMOL/L (ref 21–32)
COMPLEXED PSA SERPL-MCNC: 1.74 NG/ML (ref ?–4)
CREAT BLD-MCNC: 1.33 MG/DL
EGFRCR SERPLBLD CKD-EPI 2021: 64 ML/MIN/1.73M2 (ref 60–?)
EOSINOPHIL # BLD AUTO: 0.19 X10(3) UL (ref 0–0.7)
EOSINOPHIL NFR BLD AUTO: 3.2 %
ERYTHROCYTE [DISTWIDTH] IN BLOOD BY AUTOMATED COUNT: 12.3 %
FASTING PATIENT LIPID ANSWER: YES
FASTING STATUS PATIENT QL REPORTED: YES
GLOBULIN PLAS-MCNC: 4 G/DL (ref 2.8–4.4)
GLUCOSE BLD-MCNC: 97 MG/DL (ref 70–99)
HCT VFR BLD AUTO: 45.8 %
HDLC SERPL-MCNC: 65 MG/DL (ref 40–59)
HGB BLD-MCNC: 15.3 G/DL
IMM GRANULOCYTES # BLD AUTO: 0.03 X10(3) UL (ref 0–1)
IMM GRANULOCYTES NFR BLD: 0.5 %
LDLC SERPL CALC-MCNC: 102 MG/DL (ref ?–100)
LYMPHOCYTES # BLD AUTO: 1.82 X10(3) UL (ref 1–4)
LYMPHOCYTES NFR BLD AUTO: 30.6 %
MCH RBC QN AUTO: 31.4 PG (ref 26–34)
MCHC RBC AUTO-ENTMCNC: 33.4 G/DL (ref 31–37)
MCV RBC AUTO: 93.9 FL
MONOCYTES # BLD AUTO: 0.59 X10(3) UL (ref 0.1–1)
MONOCYTES NFR BLD AUTO: 9.9 %
NEUTROPHILS # BLD AUTO: 3.28 X10 (3) UL (ref 1.5–7.7)
NEUTROPHILS # BLD AUTO: 3.28 X10(3) UL (ref 1.5–7.7)
NEUTROPHILS NFR BLD AUTO: 55.3 %
NONHDLC SERPL-MCNC: 135 MG/DL (ref ?–130)
OSMOLALITY SERPL CALC.SUM OF ELEC: 289 MOSM/KG (ref 275–295)
PLATELET # BLD AUTO: 203 10(3)UL (ref 150–450)
POTASSIUM SERPL-SCNC: 4.2 MMOL/L (ref 3.5–5.1)
PROT SERPL-MCNC: 7.5 G/DL (ref 6.4–8.2)
RBC # BLD AUTO: 4.88 X10(6)UL
SODIUM SERPL-SCNC: 139 MMOL/L (ref 136–145)
TRIGL SERPL-MCNC: 194 MG/DL (ref 30–149)
TSI SER-ACNC: 4.24 MIU/ML (ref 0.36–3.74)
VLDLC SERPL CALC-MCNC: 33 MG/DL (ref 0–30)
WBC # BLD AUTO: 5.9 X10(3) UL (ref 4–11)

## 2024-04-24 PROCEDURE — 36415 COLL VENOUS BLD VENIPUNCTURE: CPT

## 2024-04-24 PROCEDURE — 84439 ASSAY OF FREE THYROXINE: CPT

## 2024-04-24 PROCEDURE — 84443 ASSAY THYROID STIM HORMONE: CPT

## 2024-04-24 PROCEDURE — 80061 LIPID PANEL: CPT

## 2024-04-24 PROCEDURE — 80053 COMPREHEN METABOLIC PANEL: CPT

## 2024-04-24 PROCEDURE — 85025 COMPLETE CBC W/AUTO DIFF WBC: CPT

## 2024-04-24 NOTE — TELEPHONE ENCOUNTER
From: Marshall Mesa  To: Gilda Lopez  Sent: 4/24/2024 7:43 AM CDT  Subject: Bloodwork    Good morning. I notice I missed the bloodwork due in February and I'm wondering if that is still valid if I go in [facility]?    Thanks,  Marshall

## 2024-04-25 LAB — T4 FREE SERPL-MCNC: 0.8 NG/DL (ref 0.8–1.7)

## 2024-04-25 NOTE — PROGRESS NOTES
Normal white and red blood cell counts.  Normal kidney and liver function testing.  Normal blood sugar testing.  Mild elevation in the lipid panel with triglycerides, cholesterol and LDL LDL goal would be less than 70 with history of mild abnormality in heart scan make appointment to discuss    TSH (thyroid-stimulating hormone) elevated 4.2 repeat again in 3 months with thyroid antibodies.  Order will be placed  Normal prostate testing    Sincerely,   Gilda Lopez PA-C

## 2024-07-09 DIAGNOSIS — J45.40 MODERATE PERSISTENT ASTHMA WITHOUT COMPLICATION (HCC): ICD-10-CM

## 2024-07-10 ENCOUNTER — PATIENT MESSAGE (OUTPATIENT)
Dept: FAMILY MEDICINE CLINIC | Facility: CLINIC | Age: 54
End: 2024-07-10

## 2024-07-10 DIAGNOSIS — J45.40 MODERATE PERSISTENT ASTHMA WITHOUT COMPLICATION (HCC): ICD-10-CM

## 2024-07-10 RX ORDER — MONTELUKAST SODIUM 10 MG/1
10 TABLET ORAL NIGHTLY
Qty: 30 TABLET | Refills: 0 | Status: SHIPPED | OUTPATIENT
Start: 2024-07-10

## 2024-07-10 RX ORDER — MONTELUKAST SODIUM 10 MG/1
10 TABLET ORAL NIGHTLY
Qty: 90 TABLET | Refills: 0 | OUTPATIENT
Start: 2024-07-10

## 2024-07-10 NOTE — TELEPHONE ENCOUNTER
From: Marshall Mesa  To: Gilda Lopez  Sent: 7/10/2024 11:50 AM CDT  Subject: Refill Rx    Hi. Can I get a refill of Montelukast Sodium 10 MG Oral Tablet? I received an automated message saying it was denied.    Thanks,  Marshall

## 2024-07-17 ENCOUNTER — ANESTHESIA (OUTPATIENT)
Dept: SURGERY | Facility: HOSPITAL | Age: 54
End: 2024-07-17
Payer: COMMERCIAL

## 2024-07-17 ENCOUNTER — HOSPITAL ENCOUNTER (OUTPATIENT)
Facility: HOSPITAL | Age: 54
Setting detail: OBSERVATION
Discharge: HOME | End: 2024-07-18
Attending: EMERGENCY MEDICINE | Admitting: SURGERY
Payer: COMMERCIAL

## 2024-07-17 ENCOUNTER — APPOINTMENT (OUTPATIENT)
Dept: CT IMAGING | Age: 54
End: 2024-07-17
Attending: EMERGENCY MEDICINE
Payer: COMMERCIAL

## 2024-07-17 ENCOUNTER — ANESTHESIA EVENT (OUTPATIENT)
Dept: SURGERY | Facility: HOSPITAL | Age: 54
End: 2024-07-17
Payer: COMMERCIAL

## 2024-07-17 DIAGNOSIS — K35.30 ACUTE APPENDICITIS WITH LOCALIZED PERITONITIS, WITHOUT PERFORATION, ABSCESS, OR GANGRENE: Primary | ICD-10-CM

## 2024-07-17 DIAGNOSIS — N18.9 CHRONIC KIDNEY DISEASE, UNSPECIFIED CKD STAGE: ICD-10-CM

## 2024-07-17 DIAGNOSIS — K37 APPENDICITIS: ICD-10-CM

## 2024-07-17 LAB
ALBUMIN SERPL-MCNC: 3.6 G/DL (ref 3.4–5)
ALBUMIN/GLOB SERPL: 1 {RATIO} (ref 1–2)
ALP LIVER SERPL-CCNC: 82 U/L
ALT SERPL-CCNC: 45 U/L
ANION GAP SERPL CALC-SCNC: 3 MMOL/L (ref 0–18)
ANTIBODY SCREEN: NEGATIVE
APTT PPP: 30.3 SECONDS (ref 23–36)
AST SERPL-CCNC: 34 U/L (ref 15–37)
BASOPHILS # BLD AUTO: 0.04 X10(3) UL (ref 0–0.2)
BASOPHILS NFR BLD AUTO: 0.5 %
BILIRUB SERPL-MCNC: 3.8 MG/DL (ref 0.1–2)
BUN BLD-MCNC: 13 MG/DL (ref 9–23)
CALCIUM BLD-MCNC: 9.4 MG/DL (ref 8.5–10.1)
CHLORIDE SERPL-SCNC: 103 MMOL/L (ref 98–112)
CO2 SERPL-SCNC: 29 MMOL/L (ref 21–32)
CREAT BLD-MCNC: 1.35 MG/DL
EGFRCR SERPLBLD CKD-EPI 2021: 62 ML/MIN/1.73M2 (ref 60–?)
EOSINOPHIL # BLD AUTO: 0.13 X10(3) UL (ref 0–0.7)
EOSINOPHIL NFR BLD AUTO: 1.6 %
ERYTHROCYTE [DISTWIDTH] IN BLOOD BY AUTOMATED COUNT: 12.2 %
GLOBULIN PLAS-MCNC: 3.6 G/DL (ref 2.8–4.4)
GLUCOSE BLD-MCNC: 106 MG/DL (ref 70–99)
HCT VFR BLD AUTO: 45.5 %
HGB BLD-MCNC: 15.6 G/DL
IMM GRANULOCYTES # BLD AUTO: 0.03 X10(3) UL (ref 0–1)
IMM GRANULOCYTES NFR BLD: 0.4 %
INR BLD: 1.09 (ref 0.8–1.2)
LIPASE SERPL-CCNC: 39 U/L (ref 12–53)
LYMPHOCYTES # BLD AUTO: 1.13 X10(3) UL (ref 1–4)
LYMPHOCYTES NFR BLD AUTO: 13.8 %
MCH RBC QN AUTO: 32.7 PG (ref 26–34)
MCHC RBC AUTO-ENTMCNC: 34.3 G/DL (ref 31–37)
MCV RBC AUTO: 95.4 FL
MONOCYTES # BLD AUTO: 0.82 X10(3) UL (ref 0.1–1)
MONOCYTES NFR BLD AUTO: 10 %
NEUTROPHILS # BLD AUTO: 6.02 X10 (3) UL (ref 1.5–7.7)
NEUTROPHILS # BLD AUTO: 6.02 X10(3) UL (ref 1.5–7.7)
NEUTROPHILS NFR BLD AUTO: 73.7 %
OSMOLALITY SERPL CALC.SUM OF ELEC: 281 MOSM/KG (ref 275–295)
PLATELET # BLD AUTO: 204 10(3)UL (ref 150–450)
POTASSIUM SERPL-SCNC: 4 MMOL/L (ref 3.5–5.1)
PROT SERPL-MCNC: 7.2 G/DL (ref 6.4–8.2)
PROTHROMBIN TIME: 14.1 SECONDS (ref 11.6–14.8)
RBC # BLD AUTO: 4.77 X10(6)UL
RH BLOOD TYPE: POSITIVE
RH BLOOD TYPE: POSITIVE
SODIUM SERPL-SCNC: 135 MMOL/L (ref 136–145)
WBC # BLD AUTO: 8.2 X10(3) UL (ref 4–11)

## 2024-07-17 PROCEDURE — 99223 1ST HOSP IP/OBS HIGH 75: CPT | Performed by: STUDENT IN AN ORGANIZED HEALTH CARE EDUCATION/TRAINING PROGRAM

## 2024-07-17 PROCEDURE — 0DTJ4ZZ RESECTION OF APPENDIX, PERCUTANEOUS ENDOSCOPIC APPROACH: ICD-10-PCS | Performed by: STUDENT IN AN ORGANIZED HEALTH CARE EDUCATION/TRAINING PROGRAM

## 2024-07-17 PROCEDURE — 74177 CT ABD & PELVIS W/CONTRAST: CPT | Performed by: EMERGENCY MEDICINE

## 2024-07-17 RX ORDER — HYDROCODONE BITARTRATE AND ACETAMINOPHEN 5; 325 MG/1; MG/1
2 TABLET ORAL EVERY 4 HOURS PRN
Status: DISCONTINUED | OUTPATIENT
Start: 2024-07-17 | End: 2024-07-18

## 2024-07-17 RX ORDER — KETOROLAC TROMETHAMINE 30 MG/ML
INJECTION, SOLUTION INTRAMUSCULAR; INTRAVENOUS AS NEEDED
Status: DISCONTINUED | OUTPATIENT
Start: 2024-07-17 | End: 2024-07-17 | Stop reason: SURG

## 2024-07-17 RX ORDER — HYDROMORPHONE HYDROCHLORIDE 1 MG/ML
0.4 INJECTION, SOLUTION INTRAMUSCULAR; INTRAVENOUS; SUBCUTANEOUS EVERY 2 HOUR PRN
Status: DISCONTINUED | OUTPATIENT
Start: 2024-07-17 | End: 2024-07-18

## 2024-07-17 RX ORDER — ROCURONIUM BROMIDE 10 MG/ML
INJECTION, SOLUTION INTRAVENOUS AS NEEDED
Status: DISCONTINUED | OUTPATIENT
Start: 2024-07-17 | End: 2024-07-17 | Stop reason: SURG

## 2024-07-17 RX ORDER — METOCLOPRAMIDE HYDROCHLORIDE 5 MG/ML
INJECTION INTRAMUSCULAR; INTRAVENOUS AS NEEDED
Status: DISCONTINUED | OUTPATIENT
Start: 2024-07-17 | End: 2024-07-17 | Stop reason: SURG

## 2024-07-17 RX ORDER — ACETAMINOPHEN 500 MG
1000 TABLET ORAL ONCE AS NEEDED
Status: DISCONTINUED | OUTPATIENT
Start: 2024-07-17 | End: 2024-07-17 | Stop reason: HOSPADM

## 2024-07-17 RX ORDER — ACETAMINOPHEN 325 MG/1
650 TABLET ORAL EVERY 4 HOURS PRN
Status: DISCONTINUED | OUTPATIENT
Start: 2024-07-17 | End: 2024-07-18

## 2024-07-17 RX ORDER — HYDROCODONE BITARTRATE AND ACETAMINOPHEN 5; 325 MG/1; MG/1
1 TABLET ORAL ONCE AS NEEDED
Status: DISCONTINUED | OUTPATIENT
Start: 2024-07-17 | End: 2024-07-17 | Stop reason: HOSPADM

## 2024-07-17 RX ORDER — MORPHINE SULFATE 4 MG/ML
4 INJECTION, SOLUTION INTRAMUSCULAR; INTRAVENOUS ONCE
Status: DISCONTINUED | OUTPATIENT
Start: 2024-07-17 | End: 2024-07-17

## 2024-07-17 RX ORDER — SODIUM CHLORIDE, SODIUM LACTATE, POTASSIUM CHLORIDE, CALCIUM CHLORIDE 600; 310; 30; 20 MG/100ML; MG/100ML; MG/100ML; MG/100ML
INJECTION, SOLUTION INTRAVENOUS CONTINUOUS PRN
Status: DISCONTINUED | OUTPATIENT
Start: 2024-07-17 | End: 2024-07-17 | Stop reason: SURG

## 2024-07-17 RX ORDER — HYDROMORPHONE HYDROCHLORIDE 1 MG/ML
0.4 INJECTION, SOLUTION INTRAMUSCULAR; INTRAVENOUS; SUBCUTANEOUS EVERY 5 MIN PRN
Status: DISCONTINUED | OUTPATIENT
Start: 2024-07-17 | End: 2024-07-17 | Stop reason: HOSPADM

## 2024-07-17 RX ORDER — MIDAZOLAM HYDROCHLORIDE 1 MG/ML
INJECTION INTRAMUSCULAR; INTRAVENOUS
Status: COMPLETED
Start: 2024-07-17 | End: 2024-07-17

## 2024-07-17 RX ORDER — ENOXAPARIN SODIUM 100 MG/ML
40 INJECTION SUBCUTANEOUS DAILY
Status: DISCONTINUED | OUTPATIENT
Start: 2024-07-17 | End: 2024-07-18

## 2024-07-17 RX ORDER — BUPIVACAINE HYDROCHLORIDE AND EPINEPHRINE 5; 5 MG/ML; UG/ML
INJECTION, SOLUTION EPIDURAL; INTRACAUDAL; PERINEURAL AS NEEDED
Status: DISCONTINUED | OUTPATIENT
Start: 2024-07-17 | End: 2024-07-17 | Stop reason: HOSPADM

## 2024-07-17 RX ORDER — METOPROLOL TARTRATE 1 MG/ML
2.5 INJECTION, SOLUTION INTRAVENOUS ONCE
Status: DISCONTINUED | OUTPATIENT
Start: 2024-07-17 | End: 2024-07-17 | Stop reason: HOSPADM

## 2024-07-17 RX ORDER — NALOXONE HYDROCHLORIDE 0.4 MG/ML
80 INJECTION, SOLUTION INTRAMUSCULAR; INTRAVENOUS; SUBCUTANEOUS AS NEEDED
Status: DISCONTINUED | OUTPATIENT
Start: 2024-07-17 | End: 2024-07-17 | Stop reason: HOSPADM

## 2024-07-17 RX ORDER — MIDAZOLAM HYDROCHLORIDE 1 MG/ML
1 INJECTION INTRAMUSCULAR; INTRAVENOUS EVERY 5 MIN PRN
Status: DISCONTINUED | OUTPATIENT
Start: 2024-07-17 | End: 2024-07-17 | Stop reason: HOSPADM

## 2024-07-17 RX ORDER — PROCHLORPERAZINE EDISYLATE 5 MG/ML
5 INJECTION INTRAMUSCULAR; INTRAVENOUS EVERY 8 HOURS PRN
Status: DISCONTINUED | OUTPATIENT
Start: 2024-07-17 | End: 2024-07-17 | Stop reason: HOSPADM

## 2024-07-17 RX ORDER — MIDAZOLAM HYDROCHLORIDE 1 MG/ML
INJECTION INTRAMUSCULAR; INTRAVENOUS AS NEEDED
Status: DISCONTINUED | OUTPATIENT
Start: 2024-07-17 | End: 2024-07-17 | Stop reason: SURG

## 2024-07-17 RX ORDER — ONDANSETRON 2 MG/ML
INJECTION INTRAMUSCULAR; INTRAVENOUS AS NEEDED
Status: DISCONTINUED | OUTPATIENT
Start: 2024-07-17 | End: 2024-07-17 | Stop reason: SURG

## 2024-07-17 RX ORDER — SODIUM CHLORIDE 9 MG/ML
INJECTION, SOLUTION INTRAVENOUS CONTINUOUS
Status: DISCONTINUED | OUTPATIENT
Start: 2024-07-17 | End: 2024-07-18

## 2024-07-17 RX ORDER — LABETALOL HYDROCHLORIDE 5 MG/ML
5 INJECTION, SOLUTION INTRAVENOUS EVERY 5 MIN PRN
Status: DISCONTINUED | OUTPATIENT
Start: 2024-07-17 | End: 2024-07-17 | Stop reason: HOSPADM

## 2024-07-17 RX ORDER — HYDROMORPHONE HYDROCHLORIDE 1 MG/ML
INJECTION, SOLUTION INTRAMUSCULAR; INTRAVENOUS; SUBCUTANEOUS
Status: COMPLETED
Start: 2024-07-17 | End: 2024-07-17

## 2024-07-17 RX ORDER — MEPERIDINE HYDROCHLORIDE 25 MG/ML
12.5 INJECTION INTRAMUSCULAR; INTRAVENOUS; SUBCUTANEOUS AS NEEDED
Status: DISCONTINUED | OUTPATIENT
Start: 2024-07-17 | End: 2024-07-17 | Stop reason: HOSPADM

## 2024-07-17 RX ORDER — HYDROMORPHONE HYDROCHLORIDE 1 MG/ML
0.1 INJECTION, SOLUTION INTRAMUSCULAR; INTRAVENOUS; SUBCUTANEOUS EVERY 2 HOUR PRN
Status: DISCONTINUED | OUTPATIENT
Start: 2024-07-17 | End: 2024-07-18

## 2024-07-17 RX ORDER — DEXAMETHASONE SODIUM PHOSPHATE 4 MG/ML
VIAL (ML) INJECTION AS NEEDED
Status: DISCONTINUED | OUTPATIENT
Start: 2024-07-17 | End: 2024-07-17 | Stop reason: SURG

## 2024-07-17 RX ORDER — KETOROLAC TROMETHAMINE 15 MG/ML
15 INJECTION, SOLUTION INTRAMUSCULAR; INTRAVENOUS ONCE
Status: COMPLETED | OUTPATIENT
Start: 2024-07-17 | End: 2024-07-17

## 2024-07-17 RX ORDER — LIDOCAINE HYDROCHLORIDE 10 MG/ML
INJECTION, SOLUTION EPIDURAL; INFILTRATION; INTRACAUDAL; PERINEURAL AS NEEDED
Status: DISCONTINUED | OUTPATIENT
Start: 2024-07-17 | End: 2024-07-17 | Stop reason: SURG

## 2024-07-17 RX ORDER — HYDROMORPHONE HYDROCHLORIDE 1 MG/ML
0.2 INJECTION, SOLUTION INTRAMUSCULAR; INTRAVENOUS; SUBCUTANEOUS EVERY 2 HOUR PRN
Status: DISCONTINUED | OUTPATIENT
Start: 2024-07-17 | End: 2024-07-18

## 2024-07-17 RX ORDER — HYDROCODONE BITARTRATE AND ACETAMINOPHEN 5; 325 MG/1; MG/1
1 TABLET ORAL EVERY 4 HOURS PRN
Status: DISCONTINUED | OUTPATIENT
Start: 2024-07-17 | End: 2024-07-18

## 2024-07-17 RX ORDER — ONDANSETRON 2 MG/ML
4 INJECTION INTRAMUSCULAR; INTRAVENOUS EVERY 6 HOURS PRN
Status: DISCONTINUED | OUTPATIENT
Start: 2024-07-17 | End: 2024-07-18

## 2024-07-17 RX ORDER — HYDROCODONE BITARTRATE AND ACETAMINOPHEN 5; 325 MG/1; MG/1
2 TABLET ORAL ONCE AS NEEDED
Status: DISCONTINUED | OUTPATIENT
Start: 2024-07-17 | End: 2024-07-17 | Stop reason: HOSPADM

## 2024-07-17 RX ORDER — PROCHLORPERAZINE EDISYLATE 5 MG/ML
5 INJECTION INTRAMUSCULAR; INTRAVENOUS EVERY 8 HOURS PRN
Status: DISCONTINUED | OUTPATIENT
Start: 2024-07-17 | End: 2024-07-18

## 2024-07-17 RX ORDER — METRONIDAZOLE 500 MG/100ML
INJECTION, SOLUTION INTRAVENOUS AS NEEDED
Status: DISCONTINUED | OUTPATIENT
Start: 2024-07-17 | End: 2024-07-17 | Stop reason: SURG

## 2024-07-17 RX ORDER — HYDROMORPHONE HYDROCHLORIDE 1 MG/ML
0.2 INJECTION, SOLUTION INTRAMUSCULAR; INTRAVENOUS; SUBCUTANEOUS EVERY 5 MIN PRN
Status: DISCONTINUED | OUTPATIENT
Start: 2024-07-17 | End: 2024-07-17 | Stop reason: HOSPADM

## 2024-07-17 RX ORDER — SODIUM CHLORIDE, SODIUM LACTATE, POTASSIUM CHLORIDE, CALCIUM CHLORIDE 600; 310; 30; 20 MG/100ML; MG/100ML; MG/100ML; MG/100ML
INJECTION, SOLUTION INTRAVENOUS CONTINUOUS
Status: DISCONTINUED | OUTPATIENT
Start: 2024-07-17 | End: 2024-07-17 | Stop reason: HOSPADM

## 2024-07-17 RX ORDER — HYDROMORPHONE HYDROCHLORIDE 1 MG/ML
0.6 INJECTION, SOLUTION INTRAMUSCULAR; INTRAVENOUS; SUBCUTANEOUS EVERY 5 MIN PRN
Status: DISCONTINUED | OUTPATIENT
Start: 2024-07-17 | End: 2024-07-17 | Stop reason: HOSPADM

## 2024-07-17 RX ORDER — ONDANSETRON 2 MG/ML
4 INJECTION INTRAMUSCULAR; INTRAVENOUS EVERY 6 HOURS PRN
Status: DISCONTINUED | OUTPATIENT
Start: 2024-07-17 | End: 2024-07-17 | Stop reason: HOSPADM

## 2024-07-17 RX ADMIN — LIDOCAINE HYDROCHLORIDE 50 MG: 10 INJECTION, SOLUTION EPIDURAL; INFILTRATION; INTRACAUDAL; PERINEURAL at 20:56:00

## 2024-07-17 RX ADMIN — ROCURONIUM BROMIDE 30 MG: 10 INJECTION, SOLUTION INTRAVENOUS at 21:03:00

## 2024-07-17 RX ADMIN — KETOROLAC TROMETHAMINE 30 MG: 30 INJECTION, SOLUTION INTRAMUSCULAR; INTRAVENOUS at 21:33:00

## 2024-07-17 RX ADMIN — SODIUM CHLORIDE: 9 INJECTION, SOLUTION INTRAVENOUS at 21:14:00

## 2024-07-17 RX ADMIN — ONDANSETRON 4 MG: 2 INJECTION INTRAMUSCULAR; INTRAVENOUS at 21:33:00

## 2024-07-17 RX ADMIN — METRONIDAZOLE 500 MG: 500 INJECTION, SOLUTION INTRAVENOUS at 21:13:00

## 2024-07-17 RX ADMIN — DEXAMETHASONE SODIUM PHOSPHATE 4 MG: 4 MG/ML VIAL (ML) INJECTION at 21:33:00

## 2024-07-17 RX ADMIN — SODIUM CHLORIDE, SODIUM LACTATE, POTASSIUM CHLORIDE, CALCIUM CHLORIDE: 600; 310; 30; 20 INJECTION, SOLUTION INTRAVENOUS at 21:18:00

## 2024-07-17 RX ADMIN — METOCLOPRAMIDE HYDROCHLORIDE 10 MG: 5 INJECTION INTRAMUSCULAR; INTRAVENOUS at 21:33:00

## 2024-07-17 RX ADMIN — MIDAZOLAM HYDROCHLORIDE 3 MG: 1 INJECTION INTRAMUSCULAR; INTRAVENOUS at 20:44:00

## 2024-07-17 NOTE — ED QUICK NOTES
Orders for admission, patient is aware of plan and ready to go upstairs. Any questions, please call ED RN Lorie at extension [phone].     Patient Covid vaccination status: Fully vaccinated     COVID Test Ordered in ED: None    COVID Suspicion at Admission: N/A    Running Infusions:  none    Mental Status/LOC at time of transport: A/Ox4    Other pertinent information: pt will arrive via private car  CIWA score: N/A   NIH score:  N/A

## 2024-07-17 NOTE — PLAN OF CARE
Pt presented onto unit from [place] ED - spouse is on her way from home     A&Ox4. VSS. RA. Denies chest pain and SOB.   GI: Abdomen soft, nondistended. Passing gas. Belching present.   Denies nausea.   : Voids.   Pain controlled with PRN pain medications.   Up independently   Drains: None  Incisions: None  Diet: Strict NPO pending surgical evaluation   IVF running per order. All appropriate safety measures in place. All questions and concerns addressed.

## 2024-07-17 NOTE — DISCHARGE INSTRUCTIONS
Home Care Instructions  Laparoscopic Appendectomy      WHAT TO EXPECT  You may feel pain at the incisions. This is due to stitches placed during the surgery.    You may feel pain in the shoulders. This is due to irritation of the diaphragm by the air used to inflate the abdomen.    You may feel a sore throat. This is due to the breathing tube used during surgery.     You may feel mild nausea and vomiting for the first 24 hours, this should resolve quickly.    You may have constipation, especially if taking narcotic pain medications. If you have not had a bowel movement by 48 hours after surgery, take Miralax 17g (one cap full) every 12 hours until you have a bowel movement. If another 24 hours goes by without a bowel movement, then take a dose of magnesium citrate or milk of magnesia.     MEDICATIONS  Take 2 Extra Strength Tylenol (1000mg every) 8 hours for pain. For the first 3 days it is best to take the Tylenol every 8 hours even if you do not feel much pain.     For moderate to severe pain take one Oxycodone pill (5mg) every six hours as needed for pain. If you do not feel that narcotics are necessary you shouldn’t take them. If the pain is severe you can take two pills (10mg) every six hours.    You can also take Advil (ibuprofen) 800mg every 8 hours or Alleve (naproxen) 500mg every 12 hours for pain.     Please ask your surgeon before resuming blood thinners such as Aspirin, Plavix, Coumadin, Warfarin, Eliquis, or Xarelto. All other home medications may be resumed as scheduled.     DIET  Start with a bland diet and slowly advance to regular food as your appetite improves. Avoid spicy and greasy/fried foods until your appetite returns. Do not eat large meals. Eat small frequent meals.     Drink plenty of water or a sports drink to stay hydrated.    Do not drink alcohol (beer, wine, liquor) or use tobacco products.     You may notice loose stool or diarrhea if you eat greasy or fatty foods.      WOUND CARE  The  incisions are covered with skin glue. You can shower 24 hours after surgery and get the dressings wet.    The skin glue will stay on for 10 to 14 days after surgery.     Soap and water can get on the incisions but do not scrub the wounds. No hair dye or chemicals of any kind should get on the incisions.     Do not apply any topical ointments such as Neosporin or Hydrogen Peroxide.    Do not swim or submerge the incisions under water for 1 month.    ACTIVITY  Every day you should be up walking around the house or out doors. Do not lie in bed all day. Staying active prevents blood clots and pneumonia.    You can go up and down stairs carefully.      Do not lift more than 20 pounds or perform strenuous activity that requires straining the core muscles for 6 weeks.    You may ride in a car but should not drive the car for at least one week.     APPOINTMENT  Please call our office at [phone] soon to make an appointment.    For questions or concerns please call our office between 8:30 a.m. and 5 p.m. Monday through Friday. The number above directs to the answering service after hours to reach the on-call physician.    Please call our office immediately for fever greater than 100.5, excess bleeding, inability to urinate, severe abdominal pain, severe diarrhea, uncontrollable vomiting.      For life threatening emergencies such as severe chest pain, difficulty breathing, or loss of conciousness call [phone].

## 2024-07-17 NOTE — H&P
[facility]  History & Physical    Marshall HYLTON Maria De Jesusbrenda Patient Status:  Observation    1970 MRN [id number]   Location [facility] 3NW-A Attending Reba Sena MD   Hosp Day # 0 PCP Yana Campoverde DO     Reason for Admission:  Acute appendicitis     History of Present Illness:  Marshall HYLTON Moshe is a(n) 54 year old male with a past medical history significant for asthma, diverticulitis who presented to [facility] with concern cramping abdominal pain that began yesterday afternoon. He reports pain is localized to his right lower quadrant. He denies nausea or vomiting. He denies fever or chills, but reports feeling diaphoretic overnight. He reports 2 episodes of loose stool yesterday. He denies urinary symptoms. He reports he thought he was having an episode of diverticulitis.     Upon presentation to the emergency department he was afebrile and hypertensive (141/97). Laboratory workup revealed no leukocytosis, WBC 8.2, hemoglobin 15.6, platelets 204,000. CMP revealed mild hyponatremia (135), no other gross electrolyte abnormalities.  Creatinine elevated at 1.35 (per chart review creatinine 1.35. No elevation in LFTs. Total bilirubin elevated at 3.8. Ct abdomen and pelvis revealed distended appendix measuring 1.2 cm with small amount of adjacent soft tissue stranding concerning for early appendicitis.     He reports no previous abdominal surgeries. He reports he is not on any blood thinning medications. Last colonoscopy in May 2021 with Dr. Cheng which revealed diverticulosis of left colo and internal and external hemorrhoids.   History:  Past Medical History:    Abdominal pain    Abscess of lung with pneumonia (HCC)    [employer] [place]    Asthma (HCC)    Blood in the stool    Chronic rhinitis    seasonal    Diverticulitis    Extrinsic asthma, unspecified     mainly issue during allergy season, well controlled    Night sweats     Past Surgical History:   Procedure Laterality Date     Colonoscopy      Other surgical history  2009    lung abscess removal     Family History   Problem Relation Age of Onset    Musculo-skelatal Disorder Father         back issues    Lipids Father     Musculo-skelatal Disorder Mother         hip replacement    No Known Problems Sister     Asthma Daughter       reports that he quit smoking about 15 years ago. His smoking use included cigarettes. He started smoking about 35 years ago. He has a 30.1 pack-year smoking history. He quit smokeless tobacco use about 15 years ago.  His smokeless tobacco use included chew. He reports current alcohol use of about 12.0 standard drinks of alcohol per week. He reports current drug use. Drug: Cannabis.    Allergies:  Allergies   Allergen Reactions    Seasonal     Augmentin [Amoxicillin-Pot Clavulanate] DIARRHEA       Home Medications:  Medications Prior to Admission   Medication Sig Dispense Refill Last Dose    montelukast 10 MG Oral Tab Take 1 tablet (10 mg total) by mouth nightly. 30 tablet 0 7/16/2024    albuterol 108 (90 Base) MCG/ACT Inhalation Aero Soln Inhale 2 puffs into the lungs every 6 (six) hours as needed for Wheezing (or cough). 1 each 1 7/16/2024    VENTOLIN  (90 Base) MCG/ACT Inhalation Aero Soln INHALE 2 PUFFS BY MOUTH EVERY 4 TO 6 HOURS AS NEEDED FOR WHEEZING FOR SHORTNESS OF BREATH 18 g 0 7/16/2024    fluticasone-salmeterol 250-50 MCG/ACT Inhalation Aerosol Powder, Breath Activated Inhale 1 puff into the lungs every 12 (twelve) hours. Rinse mouth after use 3 each 1 7/16/2024    pantoprazole 40 MG Oral Tab EC Take 1 tablet (40 mg total) by mouth every morning before breakfast. 90 tablet 3 7/16/2024    Cholecalciferol (VITAMIN D3) 25 MCG (1000 UT) Oral Cap Take 1 capsule by mouth daily.   7/16/2024    Psyllium (METAMUCIL FIBER OR) Take 3 capsules by mouth daily.   7/16/2024    Cetirizine HCl (ZYRTEC OR) Take 1 tablet by mouth daily.   7/16/2024       Review of Systems:    Allergic/Immuno:  Seasonal and  Augmentin [amoxicillin-pot clavulanate]  Cardiovascular:  Negative for cool extremity and irregular heartbeat/palpitations  Constitutional:  Negative for decreased activity, fever, irritability and lethargy  Endocrine:  Negative for abnormal sleep patterns, increased activity, polydipsia and polyphagia  ENMT:  Negative for ear drainage, hearing loss and nasal drainage  Eyes:  Negative for eye discharge and vision loss  Gastrointestinal:  Positive for abdominal pain, diarrhea. Negative for constipation, decreased appetite, and vomiting  Genitourinary:  Negative for dysuria and hematuria  Hema/Lymph:  Negative for easy bleeding and easy bruising  Integumentary:  Negative for pruritus and rash  Musculoskeletal:  Negative for bone/joint symptoms  Neurological:  Negative for gait disturbance  Psychiatric:  Negative for inappropriate interaction and psychiatric symptoms  Respiratory:  Negative for cough, dyspnea and wheezing    Physical Exam:   General: Awake, Alert, Cooperative.  No apparent distress.  Vital Signs:  Blood pressure 148/88, pulse 56, temperature 98.5 °F (36.9 °C), temperature source Oral, resp. rate 18, height 71\", weight 175 lb, SpO2 100%.  HEENT: Exam is unremarkable.  Without scleral icterus.  Mucous membranes are moist. Pupils are equal and round, reactive to light and accommodate.  Pupils are approximately 3mm and react to 2mm with reaction to light.  Oropharynx is clear.  Neck:   Full range of motion to flexion and extension, lateral rotation and lateral flexion of cervical spine.  No JVD. Supple.   Lungs: No respiratory distress, effort normal.   Abdomen:  Soft, non-distended, tender to palpation in right lower quadrant with no rebound or guarding.  No peritoneal signs. No ascites.  Liver is within normal limits.  Spleen is not palpable.    Extremities:  No lower extremity edema noted.  Without clubbing or cyanosis.    Skin: Normal texture and turgor.  Lymphatic:  No palpable cervical  lymphadenopathy.  Neurologic: Cranial nerves are grossly intact.  Motor strength and sensory examination is grossly normal.  No focal neurologic deficit.    Laboratory Data:  Lab Results   Component Value Date    WBC 8.2 07/17/2024    HGB 15.6 07/17/2024    HCT 45.5 07/17/2024    .0 07/17/2024     Lab Results   Component Value Date    INR 1.09 07/17/2024    INR 1.02 06/21/2023    INR 1.04 09/17/2021     Lab Results   Component Value Date     07/17/2024    K 4.0 07/17/2024     07/17/2024    CO2 29.0 07/17/2024    BUN 13 07/17/2024    CREATSERUM 1.35 07/17/2024     07/17/2024    CA 9.4 07/17/2024    ALKPHO 82 07/17/2024    ALT 45 07/17/2024    AST 34 07/17/2024    BILT 3.8 07/17/2024    ALB 3.6 07/17/2024    TP 7.2 07/17/2024       Kiesha Forbes PA-C  7/17/2024  4:00 PM    Impression and Plan:  Patient Active Problem List   Diagnosis    Allergic rhinitis    History of lung abscess    Environmental allergies    House dust mite allergy    Animal dander allergy    Allergy to mold    Moderate persistent asthma without complication (HCC)    Low vitamin D level    History of smoking 30 or more pack years    Abnormal CT of the chest    Diverticulosis    History of colonic diverticulitis    Special screening for malignant neoplasm of colon    Gandara's esophagus without dysplasia    Hyperglycemia    Acute duodenal ulcer without hemorrhage or perforation    Acute appendicitis with localized peritonitis, without perforation, abscess, or gangrene    Chronic kidney disease, unspecified CKD stage         54 year old male who presents with 2 day history of progressive abdominal pain now localized to the right lower quadrant. I reviewed his CT scan of the abdomen and pelvis and there is a dilated appendix with periappendiceal fat stranding. I discussed with him the treatment options. Recommend proceeding with laparoscopic appendectomy. The risks, benefits, and alternatives of surgical intervention were  explained to the patient and the family in detail, including but not limited to bleeding, infection, nondiagnostic yield, incorrect diagnosis, injury to adjacent organs and structures, postoperative infection and/or abscess, conversion to open procedure, and the need for further therapeutic, diagnostic, or surgical intervention.  Chito Lester MD   [facility] General Surgery           Is this a shared or split note between Advanced Practice Provider and Physician? Yes

## 2024-07-17 NOTE — ED PROVIDER NOTES
Patient Seen in: [facility] In [place]      History     Chief Complaint   Patient presents with    Abdominal Pain     Stated Complaint: abd pain    Subjective:   54-year-old male, no history of cardiac disease, former smoker, nondiabetic, presents with left lower quadrant pain.  Started last night.  Loose stools but no bloody stools.  No dysuria hematuria.  No back or flank pain.  No fevers.  History of diverticulitis and this feels similar.  Last occurrence was a few years ago.  Had a colonoscopy last year and states everything was normal.            Objective:   Past Medical History:    Abdominal pain    Abscess of lung with pneumonia (HCC)    [employer] [place]    Asthma (HCC)    Blood in the stool    Chronic rhinitis    seasonal    Diverticulitis    Extrinsic asthma, unspecified     mainly issue during allergy season, well controlled    Night sweats              Past Surgical History:   Procedure Laterality Date    Colonoscopy      Other surgical history  2009    lung abscess removal                Social History     Socioeconomic History    Marital status:    Tobacco Use    Smoking status: Former     Current packs/day: 0.00     Average packs/day: 1.5 packs/day for 20.0 years (30.1 ttl pk-yrs)     Types: Cigarettes     Start date: 4/25/1989     Quit date: 4/3/2009     Years since quitting: 15.2    Smokeless tobacco: Former     Types: Chew     Quit date: 1/1/2009   Vaping Use    Vaping status: Never Used   Substance and Sexual Activity    Alcohol use: Yes     Alcohol/week: 12.0 standard drinks of alcohol     Types: 12 Cans of beer per week     Comment: 6-12 beers weekly    Drug use: Yes     Types: Cannabis   Other Topics Concern    Caffeine Concern No    Occupational Exposure Yes     Comment: Dust, Fumes, Noise, Second-hand smoke, & Solvents    Sleep Concern Yes    Exercise Yes     Comment: 3-4 x weekly    Seat Belt Yes              Review of Systems   Constitutional:  Negative for  fever.   Respiratory:  Negative for shortness of breath.    Cardiovascular:  Negative for chest pain.   Gastrointestinal:  Positive for abdominal pain and diarrhea. Negative for blood in stool and vomiting.   Genitourinary:  Negative for dysuria, flank pain and testicular pain.   Musculoskeletal:  Negative for back pain.   Skin:  Negative for rash.       Positive for stated Chief Complaint: Abdominal Pain    Other systems are as noted in HPI.  Constitutional and vital signs reviewed.      All other systems reviewed and negative except as noted above.    Physical Exam     ED Triage Vitals [07/17/24 0815]   BP (!) 141/97   Pulse 78   Resp 15   Temp 98.1 °F (36.7 °C)   Temp src Temporal   SpO2 99 %   O2 Device None (Room air)       Current Vitals:   Vital Signs  BP: (!) 149/97  Pulse: 71  Resp: 14  Temp: 98.1 °F (36.7 °C)  Temp src: Temporal    Oxygen Therapy  SpO2: 99 %  O2 Device: None (Room air)            Physical Exam  Vitals and nursing note reviewed.   Constitutional:       Appearance: He is well-developed. He is not ill-appearing, toxic-appearing or diaphoretic.   HENT:      Head: Normocephalic.   Cardiovascular:      Rate and Rhythm: Normal rate and regular rhythm.      Heart sounds: Normal heart sounds.   Pulmonary:      Effort: Pulmonary effort is normal. No respiratory distress.      Breath sounds: Normal breath sounds.   Abdominal:      General: Bowel sounds are normal.      Palpations: Abdomen is soft.      Tenderness: There is abdominal tenderness in the suprapubic area and left lower quadrant. There is no right CVA tenderness or left CVA tenderness.      Hernia: No hernia is present.   Skin:     General: Skin is warm and dry.      Findings: No rash.   Neurological:      General: No focal deficit present.      Mental Status: He is alert.   Psychiatric:         Mood and Affect: Mood normal.         Behavior: Behavior normal.               ED Course     Labs Reviewed   COMP METABOLIC PANEL (14) - Abnormal;  Notable for the following components:       Result Value    Glucose 106 (*)     Sodium 135 (*)     Creatinine 1.35 (*)     Bilirubin, Total 3.8 (*)     All other components within normal limits   LIPASE - Normal   CBC WITH DIFFERENTIAL WITH PLATELET    Narrative:     The following orders were created for panel order CBC With Differential With Platelet.  Procedure                               Abnormality         Status                     ---------                               -----------         ------                     CBC W/ DIFFERENTIAL[[id number]]                              Final result                 Please view results for these tests on the individual orders.   PROTHROMBIN TIME (PT)   PTT, ACTIVATED   TYPE AND SCREEN    Narrative:     The following orders were created for panel order Type and screen.  Procedure                               Abnormality         Status                     ---------                               -----------         ------                     ABORH (Blood Type)[[id number]]                               In process                 Antibody Screen[[id number]]                                  In process                   Please view results for these tests on the individual orders.   ABORH (BLOOD TYPE)   ANTIBODY SCREEN   RAINBOW DRAW LAVENDER   RAINBOW DRAW LIGHT GREEN   CBC W/ DIFFERENTIAL          ED Course as of 07/17/24 1014  ------------------------------------------------------------  Time: 07/17 0957  Comment: Upon reassessment he has no further left lower quadrant pain.  The periumbilical pain remains now he has more tenderness in the right lower quadrant.              [facility]      CT ABDOMEN+PELVIS(CONTRAST ONLY)(CPT=74177)    Result Date: 7/17/2024  CONCLUSION:  The appendix is distended measuring up to 1.2 cm with a small amount of adjacent soft tissue stranding.  Findings suspicious for early appendicitis.  Colonic diverticulosis with chronic appearing long segment wall  thickening involving the sigmoid colon likely sequela of prior diverticulitis.  Mild wall thickening of the urinary bladder may be seen with cystitis in the appropriate clinical setting.  LOCATION:  [facility]   Dictated by (CST): Erum Card MD on 7/17/2024 at 9:34 AM     Finalized by (CST): Erum Card MD on 7/17/2024 at 9:47 AM        I independent interpreted the CT abdomen pelvis no diverticulosis no obvious inflammatory changes to suggest acute  diverticulitis    Differential diagnosis includes, but not limited to, diverticulitis, diverticulosis, bowel obstruction, perforation, appendicitis, UTI    External chart review demonstrates outpatient telephone encounters with PCP regarding routine blood work as recently as April    54-year-old male with acute appendicitis.  Began with pain mostly left-sided last evening with some chills.  States he was sweating overnight.  Ate a little bit of food last night around 9 PM.  Had little water on his drive here this morning.  Pain becoming more intense and now localizing more to the right lower quadrant.  CT with 1.2 cm dilated appendix with some temperature changes surrounding consistent with acute appendicitis likely early.  No white count.  No fever.  Has some mild baseline CKD.  He is NPO.  Not on blood thinners.  I have paged general surgery, likely to the OR later today for appendectomy.  Patient remain NPO.  Pain is controlled.  Awaiting callback from general surgery at this time and likely sending to [facility] for [facility]      Spoke with Dr. Sena, will plan on lap appy this afternoon/evening pending OR availability. Accepts pt to obs bed as OR holding cannot keep him for several hours.                                     Medical Decision Making      Disposition and Plan     Clinical Impression:  1. Acute appendicitis with localized peritonitis, without perforation, abscess, or gangrene    2. Chronic kidney disease, unspecified CKD stage          Disposition:  Send to or/cath/gi  7/17/2024  9:59 am    Follow-up:  No follow-up provider specified.        Medications Prescribed:  Current Discharge Medication List

## 2024-07-18 VITALS
TEMPERATURE: 99 F | HEIGHT: 71 IN | RESPIRATION RATE: 18 BRPM | BODY MASS INDEX: 24.5 KG/M2 | DIASTOLIC BLOOD PRESSURE: 85 MMHG | HEART RATE: 62 BPM | SYSTOLIC BLOOD PRESSURE: 145 MMHG | WEIGHT: 175 LBS | OXYGEN SATURATION: 97 %

## 2024-07-18 RX ORDER — KETOROLAC TROMETHAMINE 30 MG/ML
30 INJECTION, SOLUTION INTRAMUSCULAR; INTRAVENOUS EVERY 6 HOURS
Status: DISCONTINUED | OUTPATIENT
Start: 2024-07-18 | End: 2024-07-18

## 2024-07-18 RX ORDER — IBUPROFEN 600 MG/1
600 TABLET ORAL EVERY 6 HOURS SCHEDULED
Status: DISCONTINUED | OUTPATIENT
Start: 2024-07-19 | End: 2024-07-18

## 2024-07-18 RX ORDER — ACETAMINOPHEN 500 MG
1000 TABLET ORAL EVERY 8 HOURS SCHEDULED
Status: DISCONTINUED | OUTPATIENT
Start: 2024-07-18 | End: 2024-07-18

## 2024-07-18 RX ORDER — ALBUTEROL SULFATE 90 UG/1
2 AEROSOL, METERED RESPIRATORY (INHALATION) EVERY 6 HOURS PRN
Status: DISCONTINUED | OUTPATIENT
Start: 2024-07-18 | End: 2024-07-18

## 2024-07-18 RX ORDER — SODIUM CHLORIDE 9 MG/ML
INJECTION, SOLUTION INTRAVENOUS CONTINUOUS
Status: DISCONTINUED | OUTPATIENT
Start: 2024-07-18 | End: 2024-07-18

## 2024-07-18 RX ORDER — FLUTICASONE FUROATE AND VILANTEROL 200; 25 UG/1; UG/1
1 POWDER RESPIRATORY (INHALATION) DAILY
Status: DISCONTINUED | OUTPATIENT
Start: 2024-07-18 | End: 2024-07-18

## 2024-07-18 RX ORDER — OXYCODONE HYDROCHLORIDE 5 MG/1
5 TABLET ORAL EVERY 4 HOURS PRN
Status: DISCONTINUED | OUTPATIENT
Start: 2024-07-18 | End: 2024-07-18

## 2024-07-18 RX ORDER — PANTOPRAZOLE SODIUM 40 MG/1
40 TABLET, DELAYED RELEASE ORAL
Status: DISCONTINUED | OUTPATIENT
Start: 2024-07-18 | End: 2024-07-18

## 2024-07-18 RX ORDER — HYDROCODONE BITARTRATE AND ACETAMINOPHEN 5; 325 MG/1; MG/1
1 TABLET ORAL EVERY 6 HOURS PRN
Qty: 15 TABLET | Refills: 0 | Status: SHIPPED | OUTPATIENT
Start: 2024-07-18

## 2024-07-18 RX ORDER — ENOXAPARIN SODIUM 100 MG/ML
40 INJECTION SUBCUTANEOUS DAILY
Status: DISCONTINUED | OUTPATIENT
Start: 2024-07-18 | End: 2024-07-18

## 2024-07-18 RX ORDER — ONDANSETRON 2 MG/ML
4 INJECTION INTRAMUSCULAR; INTRAVENOUS EVERY 6 HOURS PRN
Status: DISCONTINUED | OUTPATIENT
Start: 2024-07-18 | End: 2024-07-18

## 2024-07-18 NOTE — ANESTHESIA PREPROCEDURE EVALUATION
PRE-OP EVALUATION    Patient Name: Marshallperi Mesa    Admit Diagnosis: Chronic kidney disease, unspecified CKD stage [N18.9]  Acute appendicitis with localized peritonitis, without perforation, abscess, or gangrene [K35.30]    Pre-op Diagnosis: Appendicitis [K37]    LAPAROSCOPIC APPENDECTOMY, POSSIBLE OPEN    Anesthesia Procedure: LAPAROSCOPIC APPENDECTOMY, POSSIBLE OPEN (Abdomen)    Surgeons and Role:     * Chito Lester MD - Primary    Pre-op vitals reviewed.  Temp: 98.5 °F (36.9 °C)  Pulse: 56  Resp: 18  BP: 148/88  SpO2: 100 %  Body mass index is 24.41 kg/m².    Current medications reviewed.  Hospital Medications:  • [COMPLETED] sodium chloride 0.9 % IV bolus 1,000 mL  1,000 mL Intravenous Once   • [COMPLETED] ketorolac (Toradol) 15 MG/ML injection 15 mg  15 mg Intravenous Once   • [COMPLETED] iopamidol 76% (ISOVUE-370) injection for power injector  100 mL Intravenous ONCE PRN   • [COMPLETED] piperacillin-tazobactam (Zosyn) 4.5 g in dextrose 5% 100 mL IVPB-ADDV  4.5 g Intravenous Once   • [Transfer Hold] HYDROmorphone (Dilaudid) 1 MG/ML injection 0.1 mg  0.1 mg Intravenous Q2H PRN    Or   • [Transfer Hold] HYDROmorphone (Dilaudid) 1 MG/ML injection 0.2 mg  0.2 mg Intravenous Q2H PRN    Or   • [Transfer Hold] HYDROmorphone (Dilaudid) 1 MG/ML injection 0.4 mg  0.4 mg Intravenous Q2H PRN   • [Transfer Hold] acetaminophen (Tylenol) tab 650 mg  650 mg Oral Q4H PRN    Or   • [Transfer Hold] HYDROcodone-acetaminophen (Norco) 5-325 MG per tab 1 tablet  1 tablet Oral Q4H PRN    Or   • [Transfer Hold] HYDROcodone-acetaminophen (Norco) 5-325 MG per tab 2 tablet  2 tablet Oral Q4H PRN   • [Held by provider] enoxaparin (Lovenox) 40 MG/0.4ML SUBQ injection 40 mg  40 mg Subcutaneous Daily   • [Transfer Hold] ondansetron (Zofran) 4 MG/2ML injection 4 mg  4 mg Intravenous Q6H PRN   • [Transfer Hold] prochlorperazine (Compazine) 10 MG/2ML injection 5 mg  5 mg Intravenous Q8H PRN   • piperacillin-tazobactam (Zosyn) 3.375 g in  dextrose 5% 100 mL IVPB-ADDV  3.375 g Intravenous Q8H   • sodium chloride 0.9% infusion   Intravenous Continuous       Outpatient Medications:     Medications Prior to Admission   Medication Sig Dispense Refill Last Dose   • montelukast 10 MG Oral Tab Take 1 tablet (10 mg total) by mouth nightly. 30 tablet 0 7/16/2024   • albuterol 108 (90 Base) MCG/ACT Inhalation Aero Soln Inhale 2 puffs into the lungs every 6 (six) hours as needed for Wheezing (or cough). 1 each 1 7/16/2024   • VENTOLIN  (90 Base) MCG/ACT Inhalation Aero Soln INHALE 2 PUFFS BY MOUTH EVERY 4 TO 6 HOURS AS NEEDED FOR WHEEZING FOR SHORTNESS OF BREATH 18 g 0 7/16/2024   • fluticasone-salmeterol 250-50 MCG/ACT Inhalation Aerosol Powder, Breath Activated Inhale 1 puff into the lungs every 12 (twelve) hours. Rinse mouth after use 3 each 1 7/16/2024   • pantoprazole 40 MG Oral Tab EC Take 1 tablet (40 mg total) by mouth every morning before breakfast. 90 tablet 3 7/16/2024   • Cholecalciferol (VITAMIN D3) 25 MCG (1000 UT) Oral Cap Take 1 capsule by mouth daily.   7/16/2024   • Psyllium (METAMUCIL FIBER OR) Take 3 capsules by mouth daily.   7/16/2024   • Cetirizine HCl (ZYRTEC OR) Take 1 tablet by mouth daily.   7/16/2024       Allergies: Seasonal and Augmentin [amoxicillin-pot clavulanate]      Anesthesia Evaluation    Patient summary reviewed.    Anesthetic Complications  (-) history of anesthetic complications         GI/Hepatic/Renal      (+) GERD                (+) diverticulitis           Cardiovascular        Exercise tolerance: good     MET: >4                                           Endo/Other                                  Pulmonary      (+) asthma           (-) recent URI          Neuro/Psych                              AR    Past Surgical History:   Procedure Laterality Date   • Colonoscopy     • Other surgical history  2009    lung abscess removal     Social History     Socioeconomic History   • Marital status:    Tobacco  Use   • Smoking status: Former     Current packs/day: 0.00     Average packs/day: 1.5 packs/day for 20.0 years (30.1 ttl pk-yrs)     Types: Cigarettes     Start date: 4/25/1989     Quit date: 4/3/2009     Years since quitting: 15.2   • Smokeless tobacco: Former     Types: Chew     Quit date: 1/1/2009   Vaping Use   • Vaping status: Never Used   Substance and Sexual Activity   • Alcohol use: Yes     Alcohol/week: 12.0 standard drinks of alcohol     Types: 12 Cans of beer per week     Comment: 6-12 beers weekly   • Drug use: Yes     Types: Cannabis   Other Topics Concern   • Caffeine Concern No   • Occupational Exposure Yes     Comment: Dust, Fumes, Noise, Second-hand smoke, & Solvents   • Sleep Concern Yes   • Exercise Yes     Comment: 3-4 x weekly   • Seat Belt Yes     History   Drug Use   • Types: Cannabis     Available pre-op labs reviewed.  Lab Results   Component Value Date    WBC 8.2 07/17/2024    RBC 4.77 07/17/2024    HGB 15.6 07/17/2024    HCT 45.5 07/17/2024    MCV 95.4 07/17/2024    MCH 32.7 07/17/2024    MCHC 34.3 07/17/2024    RDW 12.2 07/17/2024    .0 07/17/2024     Lab Results   Component Value Date     (L) 07/17/2024    K 4.0 07/17/2024     07/17/2024    CO2 29.0 07/17/2024    BUN 13 07/17/2024    CREATSERUM 1.35 (H) 07/17/2024     (H) 07/17/2024    CA 9.4 07/17/2024     Lab Results   Component Value Date    INR 1.09 07/17/2024         Airway      Mallampati: II  Mouth opening: >3 FB  TM distance: > 6 cm  Neck ROM: full Cardiovascular      Rhythm: regular  Rate: normal     Dental             Pulmonary      Breath sounds clear to auscultation bilaterally.               Other findings        ASA: 2 and emergent  Plan: general  NPO status verified and patient meets guidelines.    Post-procedure pain management plan discussed with surgeon and patient.    Comment: D/w the patient the risks and benefits of general anesthesia including sore throat, nausea, and intraoperative  awareness.    Plan/risks discussed with: patient            Present on Admission:  **None**

## 2024-07-18 NOTE — PROGRESS NOTES
[facility]  Progress Note    Marshall WARNER Mesa Patient Status:  Observation    1970 MRN [id number]   Location [facility] 3NW-A Attending Chito Lester MD   Hosp Day # 0 PCP Yana Campoverde DO     Subjective:  The patient states that he is doing well today.  He reports mild incisional pain.  He states that the pain that he had prior to surgery has resolved.  He denies nausea or vomiting.  He is tolerating diet.    Objective/Physical Exam:  General: Alert, orientated x3.  Cooperative.  No apparent distress.  Vital Signs:  Blood pressure 145/85, pulse 62, temperature 98.4 °F (36.9 °C), temperature source Oral, resp. rate 18, height 71\", weight 175 lb (79.4 kg), SpO2 97%.  Wt Readings from Last 3 Encounters:   24 175 lb (79.4 kg)   24 182 lb (82.6 kg)   23 178 lb 9.6 oz (81 kg)     Lungs: No respiratory distress.  Cardiac: Regular rate and rhythm.   Abdomen:  Soft, non distended, incisional tenderness, with no rebound or guarding.  No peritoneal signs.   Extremities:  No lower extremity edema noted.    Incision: Clean, dry, intact, no erythema      Intake/Output:    Intake/Output Summary (Last 24 hours) at 2024 0900  Last data filed at 2024 0700  Gross per 24 hour   Intake 2670 ml   Output 1280 ml   Net 1390 ml     I/O last 3 completed shifts:  In: 2220 [P.O.:420; I.V.:1800]  Out: 755 [Urine:750; Blood:5]  I/O this shift:  In: 450 [P.O.:450]  Out: 525 [Urine:525]    Medications:    pantoprazole  40 mg Oral QAM AC    fluticasone furoate-vilanterol  1 puff Inhalation Daily    enoxaparin  40 mg Subcutaneous Daily    acetaminophen  1,000 mg Oral Q8H YADI    ketorolac  30 mg Intravenous Q6H    Followed by    [START ON 2024] ibuprofen  600 mg Oral 4 times per day       Labs:        Lab Results   Component Value Date    INR 1.09 2024    INR 1.02 2023    INR 1.04 2021         Assessment  Patient Active Problem List   Diagnosis    Allergic rhinitis    History  of lung abscess    Environmental allergies    House dust mite allergy    Animal dander allergy    Allergy to mold    Moderate persistent asthma without complication (HCC)    Low vitamin D level    History of smoking 30 or more pack years    Abnormal CT of the chest    Diverticulosis    History of colonic diverticulitis    Special screening for malignant neoplasm of colon    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  Analgesics as needed.  Discharge instructions were reviewed with the patient.  The patient is stable for discharge to home today.  Follow-up with [facility] general surgery in 2 weeks, sooner if needed.      Quality:  DVT Mechanical Prophylaxis:   SCDs, Early ambuation  DVT Pharmacologic Prophylaxis   Medication    enoxaparin (Lovenox) 40 MG/0.4ML SUBQ injection 40 mg                Code Status: Not on file  Segura: No urinary catheter in place  Segura Duration (in days):   Central line:    ZENAIDA: 7/18/2024        Bekah Bertrand PA-C  7/18/2024  9:00 AM

## 2024-07-18 NOTE — OPERATIVE REPORT
[facility]  Operative Note    Marshall Mesa Location: OR   [facility] [id number] MRN [id number]    1970 Age 54 year old   Admission Date 2024 Operation Date 2024   Attending Physician Reba Sena MD Operating Physician Chito Lester MD   PCP Yana Campoverde DO        Preoperative Diagnosis: Appendicitis [K37]  Postoperative Diagnosis: Same as pre-op diagnosis.  Procedure: Laparoscopic appendectomy    Anesthesia: General    Anesthesiologist: Anesthesiologist.: Cb Gonzales MD  Primary Surgeon: Chito Lester MD  Assistant: none  Specimen: Appendix  Estimated Blood Loss: Blood Output: 5 mL (2024  9:39 PM)     Complications: None  Drains: None  Operative Findings: Acutely inflamed non perforated appendix  Indication for Surgery:  Marshall Mesa is a 54 year old male who presents with 1 day of generalized abdominal pain that is now localized to the right lower quadrant. The patient is tender to palpation over McBurney's point. Diagnostic imaging is consistent with acute appendicitis without perforation or abscess.   Description of Procedure:   The patient was transported to the operating room and transferred to the OR table and placed in supine position. General endotracheal anesthesia was administered. A Segura catheter was placed. The abdomen was prepped and draped in sterile fashion. Pre-operative antibiotics were given. A time-out was performed.   A stab incision was made in the left upper quadrant at Tovar's point. A Veress needle was passed into the peritoneal cavity and pneumoperitoneum was established to a pressure of 15 mmHg. A supraumbilical incision was made.  A 5-mm trocar was placed through the incision and a laparoscope was inserted into the abdomen. Initial diagnostic survey revealed no injury secondary to our entry nor unexpected intraabdominal abnormality. Under direct visualization a 5-mm trocar was placed in the suprapubic area and a 12-mm trocar was placed in the  left lower quadrant. Patient is positioned in trendelenburg and right side up.  Attention was turned to the right lower quadrant. The intestines and omentum were gently manipulated to expose the appendix. The appendix was grasped, elevated and retracted. A combination of  blunt dissection and cautery were used to separate the appendix from its adhesions to the pelvic sidewall. The LigaSure device was used to divide the mesoappendix and clear the base of the appendix. An EndoGIA stapler with a purple cartridge was used to divide the base of the appendix. The appendix was placed into a retrival sac and removed from the left lower quadrant port site under direct visualization. The staple line and mesoappendix were inspected. There was no bleeding or drainage. Hemostasis was excellent. The right lower quadrant was judiciously irrigated until the effluent fluid was clear. The pelvis was also irrigated. All fluid was suctioned from the abdomen.   The 12-mm trocar was removed the incision was reapproximated using 1 PDS suture with an Endo Close device. The 5-mm trocar was removed under direct visualization. Pneumoperitoneum was aspirated and released. The 5-mm camera and trocar were removed from the umbilical position. All wounds were cleansed and irrigated and injected with local anesthetic. All skin incisions were reapproximated using  4-0 Monocryl suture. Skin glue was applied to all incisions.   The drapes were removed, the fletcher catheter was removed, and the patient was awakened from anesthesia and brought to the recovery room in good condition. The patient tolerated the procedure without apparent complication. Needle, sponge, and instrument counts were correct at the end of the procedure.     Chito Lester MD  7/17/2024  9:53 PM

## 2024-07-18 NOTE — PLAN OF CARE
Received pt at 1930. Pt is A&O x 4; follows commands, wife at bedside waiting for transport to bring pt for scheduled lap/appi w/ nourallah. Pt returned from PACU at 2330 and currently is on 2L NC, VSS, tolerating liquid diet; will advance in am. Pt is continent of bladder and is awaiting robf. Pt's pain is managed with gretchen & PRN medication and ambulates independently. IV access is patent infusing 0.9 NS @ 100 ml/hr. Shift reassessment performed; 4 lap sites noted with durmabond. Heparin given. NOC safety plan in place; bed in low position, call light and personal items within reach, pt encouraged to call staff for assistance.

## 2024-07-18 NOTE — ANESTHESIA POSTPROCEDURE EVALUATION
[facility]    Marshall WARNER Mesa Patient Status:  Observation   Age/Gender 54 year old male MRN [id number]   Location [facility] POST ANESTHESIA CARE UNIT Attending Reba Sena MD   Hosp Day # 0 PCP Yana Campoverde DO       Anesthesia Post-op Note    LAPAROSCOPIC APPENDECTOMY    Procedure Summary       Date: 07/17/24 Room / Location:  MAIN OR 08 / [facility] MAIN OR    Anesthesia Start: 2044 Anesthesia Stop: 2155    Procedure: LAPAROSCOPIC APPENDECTOMY (Abdomen) Diagnosis:       Appendicitis      (Appendicitis [K37])    Surgeons: Chito Lester MD Anesthesiologist: Cb Gonzales MD    Anesthesia Type: general ASA Status: 2 - Emergent            Anesthesia Type: general    Vitals Value Taken Time   /96 07/17/24 2153   Temp 99.2 07/17/24 2157   Pulse 79 07/17/24 2156   Resp 18 07/17/24 2156   SpO2 95 % 07/17/24 2156   Vitals shown include unfiled device data.    Patient Location: Endoscopy    Anesthesia Type: MAC    Airway Patency: patent    Postop Pain Control: adequate    Mental Status: mildly sedated but able to meaningfully participate in the post-anesthesia evaluation    Nausea/Vomiting: none    Cardiopulmonary/Hydration status: stable euvolemic    Complications: no apparent anesthesia related complications    Postop vital signs: stable    Dental Exam: Unchanged from Preop    Patient to be discharged home when criteria met.

## 2024-07-18 NOTE — PROGRESS NOTES
NURSING DISCHARGE NOTE    Discharged Home via Wheelchair.  Accompanied by RN  Belongings Taken by patient/family.IV dcd.

## 2024-07-19 ENCOUNTER — PATIENT OUTREACH (OUTPATIENT)
Dept: CASE MANAGEMENT | Age: 54
End: 2024-07-19

## 2024-08-01 ENCOUNTER — OFFICE VISIT (OUTPATIENT)
Facility: LOCATION | Age: 54
End: 2024-08-01
Payer: COMMERCIAL

## 2024-08-01 VITALS — TEMPERATURE: 98 F | HEART RATE: 76 BPM | OXYGEN SATURATION: 98 %

## 2024-08-01 DIAGNOSIS — Z98.890 POST-OPERATIVE STATE: Primary | ICD-10-CM

## 2024-08-01 PROCEDURE — 99024 POSTOP FOLLOW-UP VISIT: CPT | Performed by: PHYSICIAN ASSISTANT

## 2024-08-03 NOTE — PROGRESS NOTES
Post Operative Visit Note       Active Problems  1. Post-operative state         Chief Complaint   Chief Complaint   Patient presents with    Post-Op     PO - LAPAROSCOPIC APPENDECTOMY 7/17/24, soreness in abdominal area, hard to lay down, walking causes soreness, no other symptoms.          History of Present Illness   54 year old male who presents today for postoperative visit following laparoscopic appendectomy on 7/17/2024.    The patient states that since his surgery that he is overall doing very well.  He denies complaints today.  He denies nausea or vomiting.  He denies diarrhea or constipation.  He denies fever or chills.  He states that his incisions are healing well.  There is no redness or drainage noted.        Allergies  Marshall is allergic to seasonal and augmentin [amoxicillin-pot clavulanate].    Past Medical / Surgical / Social / Family History    The past medical and past surgical history have been reviewed by me today.     Past Medical History:    Abdominal pain    Abscess of lung with pneumonia (HCC)    Religion [place]    Asthma (HCC)    Blood in the stool    Chronic rhinitis    seasonal    Diverticulitis    Extrinsic asthma, unspecified     mainly issue during allergy season, well controlled    Night sweats     Past Surgical History:   Procedure Laterality Date    Colonoscopy      Other surgical history  2009    lung abscess removal       The family history and social history have been reviewed by me today.    Family History   Problem Relation Age of Onset    Musculo-skelatal Disorder Father         back issues    Lipids Father     Musculo-skelatal Disorder Mother         hip replacement    No Known Problems Sister     Asthma Daughter      Social History     Socioeconomic History    Marital status:    Tobacco Use    Smoking status: Former     Current packs/day: 0.00     Average packs/day: 1.5 packs/day for 20.0 years (30.1 ttl pk-yrs)     Types: Cigarettes     Start date: 4/25/1989      Quit date: 4/3/2009     Years since quitting: 15.3    Smokeless tobacco: Former     Types: Chew     Quit date: 1/1/2009   Vaping Use    Vaping status: Never Used   Substance and Sexual Activity    Alcohol use: Yes     Alcohol/week: 12.0 standard drinks of alcohol     Types: 12 Cans of beer per week     Comment: 6-12 beers weekly    Drug use: Yes     Types: Cannabis   Other Topics Concern    Caffeine Concern No    Occupational Exposure Yes     Comment: Dust, Fumes, Noise, Second-hand smoke, & Solvents    Sleep Concern Yes    Exercise Yes     Comment: 3-4 x weekly    Seat Belt Yes        Current Outpatient Medications:     HYDROcodone-acetaminophen 5-325 MG Oral Tab, Take 1 tablet by mouth every 6 (six) hours as needed., Disp: 15 tablet, Rfl: 0    montelukast 10 MG Oral Tab, Take 1 tablet (10 mg total) by mouth nightly., Disp: 30 tablet, Rfl: 0    albuterol 108 (90 Base) MCG/ACT Inhalation Aero Soln, Inhale 2 puffs into the lungs every 6 (six) hours as needed for Wheezing (or cough)., Disp: 1 each, Rfl: 1    VENTOLIN  (90 Base) MCG/ACT Inhalation Aero Soln, INHALE 2 PUFFS BY MOUTH EVERY 4 TO 6 HOURS AS NEEDED FOR WHEEZING FOR SHORTNESS OF BREATH, Disp: 18 g, Rfl: 0    fluticasone-salmeterol 250-50 MCG/ACT Inhalation Aerosol Powder, Breath Activated, Inhale 1 puff into the lungs every 12 (twelve) hours. Rinse mouth after use, Disp: 3 each, Rfl: 1    pantoprazole 40 MG Oral Tab EC, Take 1 tablet (40 mg total) by mouth every morning before breakfast., Disp: 90 tablet, Rfl: 3    Cholecalciferol (VITAMIN D3) 25 MCG (1000 UT) Oral Cap, Take 1 capsule by mouth daily., Disp: , Rfl:     Psyllium (METAMUCIL FIBER OR), Take 3 capsules by mouth daily., Disp: , Rfl:     Cetirizine HCl (ZYRTEC OR), Take 1 tablet by mouth daily., Disp: , Rfl:       Review of Systems  A 10 point Review of Systems has been completed by me today and is negative except as above in the HPI.    Physical Findings   Pulse 76   Temp 97.9 °F (36.6 °C)  (Temporal)   SpO2 98%   Gen/psych: alert and oriented, cooperative, no apparent distress  Cardiovascular: regular rate  Respiratory: respirations unlabored, no wheeze  Abdominal: soft, non-tender, non-distended, no guarding/rebound  Incisions:  Incisions have healed well.  There is no surrounding erythema or induration.       Assessment/Plan  1. Post-operative state      The patient is doing well following laparoscopic appendectomy.  The patient is to continue with diet as tolerated.  The patient is to continue with lifting restrictions of no more than 20 pounds for 6 weeks from the date of the surgery.  Surgical pathology was discussed with the patient.  All questions and concerns were answered.  The patient is return to the clinic as needed       No orders of the defined types were placed in this encounter.       Imaging & Referrals   None    Follow Up  Return if symptoms worsen or fail to improve.    Bekah Bertrand PA-C  [facility]  8/3/2024  12:23 PM

## 2024-08-04 DIAGNOSIS — J45.40 MODERATE PERSISTENT ASTHMA WITHOUT COMPLICATION (HCC): ICD-10-CM

## 2024-08-05 RX ORDER — MONTELUKAST SODIUM 10 MG/1
10 TABLET ORAL NIGHTLY
Qty: 30 TABLET | Refills: 0 | Status: SHIPPED | OUTPATIENT
Start: 2024-08-05

## 2024-08-05 NOTE — TELEPHONE ENCOUNTER
Requested Prescriptions     Signed Prescriptions Disp Refills    MONTELUKAST 10 MG Oral Tab 30 tablet 0     Sig: Take 1 tablet by mouth nightly     Authorizing Provider: SHERYL PACE     Ordering User: HANH BARRERA        Refilled per protocol/OV notes

## 2024-08-06 NOTE — PROGRESS NOTES
Multiple attempts to reach patient and messages left with no return call.  Past Transitional Care Management timeframe.  Encounter closing.

## 2024-09-03 ENCOUNTER — PATIENT MESSAGE (OUTPATIENT)
Dept: FAMILY MEDICINE CLINIC | Facility: CLINIC | Age: 54
End: 2024-09-03

## 2024-09-04 NOTE — TELEPHONE ENCOUNTER
From: Marshall Mesa  To: Gilda Lopez  Sent: 9/3/2024 1:17 PM CDT  Subject: Vaccination     Hello. I got my flu & COVID-19 shots 9.02.24 @ [facility].    Thanks,  Marshall

## 2024-09-05 DIAGNOSIS — J45.40 MODERATE PERSISTENT ASTHMA WITHOUT COMPLICATION (HCC): ICD-10-CM

## 2024-09-05 RX ORDER — MONTELUKAST SODIUM 10 MG/1
10 TABLET ORAL NIGHTLY
Qty: 15 TABLET | Refills: 0 | Status: SHIPPED | OUTPATIENT
Start: 2024-09-05 | End: 2024-09-11

## 2024-09-11 ENCOUNTER — OFFICE VISIT (OUTPATIENT)
Dept: FAMILY MEDICINE CLINIC | Facility: CLINIC | Age: 54
End: 2024-09-11
Payer: COMMERCIAL

## 2024-09-11 VITALS
WEIGHT: 174 LBS | HEART RATE: 70 BPM | BODY MASS INDEX: 24 KG/M2 | SYSTOLIC BLOOD PRESSURE: 130 MMHG | DIASTOLIC BLOOD PRESSURE: 64 MMHG

## 2024-09-11 DIAGNOSIS — Z13.228 SCREENING FOR ENDOCRINE, NUTRITIONAL, METABOLIC AND IMMUNITY DISORDER: ICD-10-CM

## 2024-09-11 DIAGNOSIS — J45.40 MODERATE PERSISTENT ASTHMA WITHOUT COMPLICATION (HCC): Primary | ICD-10-CM

## 2024-09-11 DIAGNOSIS — Z13.29 SCREENING FOR ENDOCRINE, NUTRITIONAL, METABOLIC AND IMMUNITY DISORDER: ICD-10-CM

## 2024-09-11 DIAGNOSIS — Z13.0 SCREENING FOR ENDOCRINE, NUTRITIONAL, METABOLIC AND IMMUNITY DISORDER: ICD-10-CM

## 2024-09-11 DIAGNOSIS — Z13.220 ENCOUNTER FOR LIPID SCREENING FOR CARDIOVASCULAR DISEASE: ICD-10-CM

## 2024-09-11 DIAGNOSIS — Z91.09 ENVIRONMENTAL ALLERGIES: ICD-10-CM

## 2024-09-11 DIAGNOSIS — Z13.6 ENCOUNTER FOR LIPID SCREENING FOR CARDIOVASCULAR DISEASE: ICD-10-CM

## 2024-09-11 DIAGNOSIS — Z13.21 SCREENING FOR ENDOCRINE, NUTRITIONAL, METABOLIC AND IMMUNITY DISORDER: ICD-10-CM

## 2024-09-11 PROCEDURE — 99214 OFFICE O/P EST MOD 30 MIN: CPT | Performed by: FAMILY MEDICINE

## 2024-09-11 RX ORDER — MONTELUKAST SODIUM 10 MG/1
10 TABLET ORAL NIGHTLY
Qty: 90 TABLET | Refills: 2 | Status: SHIPPED | OUTPATIENT
Start: 2024-09-11

## 2024-09-11 RX ORDER — ALBUTEROL SULFATE 90 UG/1
2 AEROSOL, METERED RESPIRATORY (INHALATION) EVERY 6 HOURS PRN
Qty: 1 EACH | Refills: 1 | Status: SHIPPED | OUTPATIENT
Start: 2024-09-11

## 2024-09-11 RX ORDER — FLUTICASONE PROPIONATE AND SALMETEROL 250; 50 UG/1; UG/1
1 POWDER RESPIRATORY (INHALATION) 2 TIMES DAILY
Qty: 3 EACH | Refills: 1 | Status: SHIPPED | OUTPATIENT
Start: 2024-09-11

## 2024-09-11 NOTE — PROGRESS NOTES
Marshall Mesa is a 54 year old male.  HPI:     ---Asthma /prior smoker/Seasonal allergies   Patient needs refill on Advair has multiple seasonal and indoor allergies uses albuterol rarely     Advair 250/50 mcg dose 1 puff once a day does not usually need the second puff.    Singulair 10 mg and Zyrtec daily.  Lung cancer CT screening scan ordered 10/2023  CT calcium over read 2023    no nodules previous ground glass nodules resolved  Quit smoking    Packs/day: 1.50        Years: 20.00        Pack years: 30        Types: Cigarettes        Start date: 1989        Quit date: 4/3/2009        Years since quittin.4  ACT is at a 24    PCV  20 UTD  Pneumonia 2022 had CT chest history of right upper lobe abscess and surgery years ago  Has not seen a pulmonologist gets no shortness of breath or cough.  Patient is wanting to do CT lung screening for cancer        ---Gandara's esophagitis  Taking pantoprazole 40 mg Gandara's esophagitis diagnosed 2022  Taking pantoprazole with no side effects and no GERD symptoms.Endoscopy done  23 by Dr. Silva repeat due in 3 year   Final Diagnosis: -Positive for Gandara's specialized intestinal metaplasia.      --Fatty liver  Liver ultrasound showed fatty liver   Alcohol intake is moderate 1-2 nightly and 12 on the weekends beer    Had appointment with hepatologist low ceruloplasmin /2023 with  who ordered a elastography which patient did not do.  Is due to have repeat liver function testing did cut back on alcohol usually just on the weekends but did not do a complete alcohol cessation      Current Outpatient Medications   Medication Sig Dispense Refill    Turmeric (QC TUMERIC COMPLEX OR) Take 1 capsule by mouth daily.      Milk Thistle 1000 MG Oral Cap Take 1 capsule by mouth daily with breakfast.      fluticasone-salmeterol 250-50 MCG/ACT Inhalation Aerosol Powder, Breath Activated Inhale 1 puff into the lungs 2 (two) times daily. Rinse  mouth after use 3 each 1    montelukast 10 MG Oral Tab Take 1 tablet (10 mg total) by mouth nightly. 90 tablet 2    albuterol 108 (90 Base) MCG/ACT Inhalation Aero Soln Inhale 2 puffs into the lungs every 6 (six) hours as needed for Wheezing (or cough). 1 each 1    pantoprazole 40 MG Oral Tab EC Take 1 tablet (40 mg total) by mouth every morning before breakfast. 90 tablet 3    Cholecalciferol (VITAMIN D3) 25 MCG (1000 UT) Oral Cap Take 2 capsules by mouth daily.      Psyllium (METAMUCIL FIBER OR) Take 3 capsules by mouth daily.      Cetirizine HCl (ZYRTEC OR) Take 1 tablet by mouth daily.        Past Medical History:    Abdominal pain    Abscess of lung with pneumonia (HCC)    [place]    Asthma (HCC)    Blood in the stool    Chronic rhinitis    seasonal    Diverticulitis    Extrinsic asthma, unspecified     mainly issue during allergy season, well controlled    Night sweats      Social History:  Social History     Socioeconomic History    Marital status:    Tobacco Use    Smoking status: Former     Current packs/day: 0.00     Average packs/day: 1.5 packs/day for 20.0 years (30.1 ttl pk-yrs)     Types: Cigarettes     Start date: 4/25/1989     Quit date: 4/3/2009     Years since quitting: 15.4    Smokeless tobacco: Former     Types: Chew     Quit date: 1/1/2009   Vaping Use    Vaping status: Never Used   Substance and Sexual Activity    Alcohol use: Yes     Alcohol/week: 12.0 standard drinks of alcohol     Types: 12 Cans of beer per week     Comment: 6-12 beers weekly    Drug use: Yes     Types: Cannabis   Other Topics Concern    Caffeine Concern No    Occupational Exposure Yes     Comment: Dust, Fumes, Noise, Second-hand smoke, & Solvents    Sleep Concern Yes    Exercise Yes     Comment: 3-4 x weekly    Seat Belt Yes     Social Determinants of Health     Food Insecurity: No Food Insecurity (7/17/2024)    Food Insecurity     Food Insecurity: Never true   Transportation Needs: No Transportation  Needs (7/17/2024)    Transportation Needs     Lack of Transportation: No   Housing Stability: Low Risk  (7/17/2024)    Housing Stability     Housing Instability: No        REVIEW OF SYSTEMS:   GENERAL HEALTH: feels well otherwise  SKIN: denies any unusual skin lesions or rashes  RESPIRATORY: denies shortness of breath with exertion see above asthma  CARDIOVASCULAR: denies chest pain on exertion  GI: denies abdominal pain and denies heartburn  NEURO: denies headaches  Musculoskeletal: No motor deficits  HEENT see above  EXAM:   /64   Pulse 70   Wt 174 lb (78.9 kg)   BMI 24.27 kg/m²   GENERAL: well developed, well nourished,in no apparent distress  SKIN: no rashes,no suspicious lesions  HEENT: TM clear bilaterally, nose edema swelling clear congestion throat clear postnasal drainage  no erythema without mass.   EYES: PERRLA, EOM intact, sclera clear without injection  NECK: supple,no adenopathy, thyroid normal to palpation  LUNGS: clear to auscultation no rales, rhonchi or wheezes  CARDIO: RRR without murmur  GI: Normal bowel sounds ×4 quadrant, no hepatosplenomegaly or masses, and no tenderness   EXTREMITIES: no cyanosis, clubbing or edema  Musculoskeletal: No gross deficit  Neurological: nerves II through XII grossly intact no sensorimotor deficit  Psychological: Mood and affect are normal.  Good communication skills.  ASSESSMENT AND PLAN:     Encounter Diagnoses   Name Primary?    Moderate persistent asthma without complication (HCC) Yes    Encounter for lipid screening for cardiovascular disease     Screening for endocrine, nutritional, metabolic and immunity disorder     Environmental allergies        Orders Placed This Encounter   Procedures    Comp Metabolic Panel (14)    TSH and Free T4    Lipid Panel       Meds & Refills for this Visit:  Requested Prescriptions     Signed Prescriptions Disp Refills    fluticasone-salmeterol 250-50 MCG/ACT Inhalation Aerosol Powder, Breath Activated 3 each 1     Sig:  Inhale 1 puff into the lungs 2 (two) times daily. Rinse mouth after use    montelukast 10 MG Oral Tab 90 tablet 2     Sig: Take 1 tablet (10 mg total) by mouth nightly.    albuterol 108 (90 Base) MCG/ACT Inhalation Aero Soln 1 each 1     Sig: Inhale 2 puffs into the lungs every 6 (six) hours as needed for Wheezing (or cough).       Imaging & Consults:  None  1. Moderate persistent asthma without complication (HCC)  Continue with Advair do 1 puff twice a day during allergy season, continue with Singulair 10 mg and albuterol as needed  Continue with antihistamine Zyrtec over-the-counter  - fluticasone-salmeterol 250-50 MCG/ACT Inhalation Aerosol Powder, Breath Activated; Inhale 1 puff into the lungs 2 (two) times daily. Rinse mouth after use  Dispense: 3 each; Refill: 1  - montelukast 10 MG Oral Tab; Take 1 tablet (10 mg total) by mouth nightly.  Dispense: 90 tablet; Refill: 2  - albuterol 108 (90 Base) MCG/ACT Inhalation Aero Soln; Inhale 2 puffs into the lungs every 6 (six) hours as needed for Wheezing (or cough).  Dispense: 1 each; Refill: 1    2. Encounter for lipid screening for cardiovascular disease  - Lipid Panel; Future    3. Screening for endocrine, nutritional, metabolic and immunity disorder  - Comp Metabolic Panel (14); Future  - TSH and Free T4; Future    4. Environmental allergies  Zyrtec over-the-counter daily    Fatty liver elevated liver function tests in the past repeat CMP is overdue for liver elastography reprinted order for patient  History of smoking overdue for CT lung LD screening reprinted for patient  Time spent was 30  minutes more than 50% was spent on counseling regarding medical conditions and treatment.  Rest of time was spent reviewing chart, reviewing blood work and radiology tests.     The patient indicates understanding of these issues and agrees to the plan.  The patient is asked to return in complete physical has appointment for 10/22/2024.

## 2024-09-12 ENCOUNTER — PATIENT MESSAGE (OUTPATIENT)
Dept: FAMILY MEDICINE CLINIC | Facility: CLINIC | Age: 54
End: 2024-09-12

## 2024-09-12 DIAGNOSIS — Z87.891 HISTORY OF PRIOR CIGARETTE SMOKING: Primary | ICD-10-CM

## 2024-09-12 DIAGNOSIS — Z12.2 SCREENING FOR LUNG CANCER: ICD-10-CM

## 2024-09-13 NOTE — TELEPHONE ENCOUNTER
From: Marshall Mesa  To: Gilda Lopez  Sent: 9/12/2024 11:59 AM CDT  Subject: CT LUNG LD SCREENING(CPT=71271)    This expires on 10.11.24- I want to do this on Sat 10.19.24 if I can. Can this request be extended thru the end of October? Also I would do my blood work on the same day so they would need to be extended (if necessary) as well.    Thanks,  Marshall

## 2024-10-11 ENCOUNTER — LAB ENCOUNTER (OUTPATIENT)
Dept: LAB | Age: 54
End: 2024-10-11
Attending: FAMILY MEDICINE
Payer: COMMERCIAL

## 2024-10-11 DIAGNOSIS — Z13.0 SCREENING FOR ENDOCRINE, NUTRITIONAL, METABOLIC AND IMMUNITY DISORDER: ICD-10-CM

## 2024-10-11 DIAGNOSIS — Z13.6 ENCOUNTER FOR LIPID SCREENING FOR CARDIOVASCULAR DISEASE: ICD-10-CM

## 2024-10-11 DIAGNOSIS — R79.89 ELEVATED TSH: ICD-10-CM

## 2024-10-11 DIAGNOSIS — Z13.29 SCREENING FOR ENDOCRINE, NUTRITIONAL, METABOLIC AND IMMUNITY DISORDER: ICD-10-CM

## 2024-10-11 DIAGNOSIS — Z13.21 SCREENING FOR ENDOCRINE, NUTRITIONAL, METABOLIC AND IMMUNITY DISORDER: ICD-10-CM

## 2024-10-11 DIAGNOSIS — Z13.228 SCREENING FOR ENDOCRINE, NUTRITIONAL, METABOLIC AND IMMUNITY DISORDER: ICD-10-CM

## 2024-10-11 DIAGNOSIS — Z13.220 ENCOUNTER FOR LIPID SCREENING FOR CARDIOVASCULAR DISEASE: ICD-10-CM

## 2024-10-11 LAB
ALBUMIN SERPL-MCNC: 4.4 G/DL (ref 3.2–4.8)
ALBUMIN/GLOB SERPL: 1.5 {RATIO} (ref 1–2)
ALP LIVER SERPL-CCNC: 66 U/L
ALT SERPL-CCNC: 39 U/L
ANION GAP SERPL CALC-SCNC: 10 MMOL/L (ref 0–18)
AST SERPL-CCNC: 41 U/L (ref ?–34)
BILIRUB SERPL-MCNC: 1.5 MG/DL (ref 0.3–1.2)
BUN BLD-MCNC: 15 MG/DL (ref 9–23)
CALCIUM BLD-MCNC: 9.3 MG/DL (ref 8.7–10.4)
CHLORIDE SERPL-SCNC: 104 MMOL/L (ref 98–112)
CHOLEST SERPL-MCNC: 183 MG/DL (ref ?–200)
CO2 SERPL-SCNC: 24 MMOL/L (ref 21–32)
CREAT BLD-MCNC: 1.08 MG/DL
EGFRCR SERPLBLD CKD-EPI 2021: 82 ML/MIN/1.73M2 (ref 60–?)
FASTING PATIENT LIPID ANSWER: YES
FASTING STATUS PATIENT QL REPORTED: YES
GLOBULIN PLAS-MCNC: 3 G/DL (ref 2–3.5)
GLUCOSE BLD-MCNC: 91 MG/DL (ref 70–99)
HDLC SERPL-MCNC: 75 MG/DL (ref 40–59)
LDLC SERPL CALC-MCNC: 85 MG/DL (ref ?–100)
NONHDLC SERPL-MCNC: 108 MG/DL (ref ?–130)
OSMOLALITY SERPL CALC.SUM OF ELEC: 286 MOSM/KG (ref 275–295)
POTASSIUM SERPL-SCNC: 4 MMOL/L (ref 3.5–5.1)
PROT SERPL-MCNC: 7.4 G/DL (ref 5.7–8.2)
SODIUM SERPL-SCNC: 138 MMOL/L (ref 136–145)
T4 FREE SERPL-MCNC: 1.4 NG/DL (ref 0.8–1.7)
THYROGLOB SERPL-MCNC: <15 U/ML (ref ?–60)
THYROPEROXIDASE AB SERPL-ACNC: 28 U/ML (ref ?–60)
TRIGL SERPL-MCNC: 134 MG/DL (ref 30–149)
TSI SER-ACNC: 2.75 MIU/ML (ref 0.55–4.78)
VLDLC SERPL CALC-MCNC: 21 MG/DL (ref 0–30)

## 2024-10-11 PROCEDURE — 84443 ASSAY THYROID STIM HORMONE: CPT

## 2024-10-11 PROCEDURE — 36415 COLL VENOUS BLD VENIPUNCTURE: CPT

## 2024-10-11 PROCEDURE — 86800 THYROGLOBULIN ANTIBODY: CPT

## 2024-10-11 PROCEDURE — 84439 ASSAY OF FREE THYROXINE: CPT

## 2024-10-11 PROCEDURE — 80053 COMPREHEN METABOLIC PANEL: CPT

## 2024-10-11 PROCEDURE — 86376 MICROSOMAL ANTIBODY EACH: CPT

## 2024-10-11 PROCEDURE — 80061 LIPID PANEL: CPT

## 2024-10-13 NOTE — PROGRESS NOTES
Normal white and red blood cell counts.  Normal kidney function testing.  Normal blood sugar testing.  Normal lipid testing HDL is elevated which is cardioprotective and LDL is improved but goal would be below 70 secondary to mild plaque on prior heart scan will discuss at office visit.    Normal thyroid testing  AST is mildly elevated continue to avoid processed foods and alcohol/acetaminophen.  Bilirubin did decrease from 3.8 down to 1.2 repeat liver function tests in 4 months to make sure stays stable  You should proceed on with getting the elastography of the liver that was ordered by the hepatologist.  Follow-up in the office as planned to discuss on 10/22/2024  Sincerely,   Gilda Lopez PA-C

## 2024-10-22 ENCOUNTER — OFFICE VISIT (OUTPATIENT)
Dept: FAMILY MEDICINE CLINIC | Facility: CLINIC | Age: 54
End: 2024-10-22
Payer: COMMERCIAL

## 2024-10-22 VITALS
HEART RATE: 78 BPM | RESPIRATION RATE: 16 BRPM | BODY MASS INDEX: 24.78 KG/M2 | OXYGEN SATURATION: 98 % | HEIGHT: 71 IN | WEIGHT: 177 LBS | TEMPERATURE: 98 F | SYSTOLIC BLOOD PRESSURE: 128 MMHG | DIASTOLIC BLOOD PRESSURE: 74 MMHG

## 2024-10-22 DIAGNOSIS — I25.10 CORONARY ARTERY DISEASE DUE TO CALCIFIED CORONARY LESION: ICD-10-CM

## 2024-10-22 DIAGNOSIS — Z00.00 WELL ADULT EXAM: Primary | ICD-10-CM

## 2024-10-22 DIAGNOSIS — D22.9 MULTIPLE PIGMENTED NEVI: ICD-10-CM

## 2024-10-22 DIAGNOSIS — Z12.5 ENCOUNTER FOR SCREENING PROSTATE SPECIFIC ANTIGEN (PSA) MEASUREMENT: ICD-10-CM

## 2024-10-22 DIAGNOSIS — I25.84 CORONARY ARTERY DISEASE DUE TO CALCIFIED CORONARY LESION: ICD-10-CM

## 2024-10-22 DIAGNOSIS — K58.0 IRRITABLE BOWEL SYNDROME WITH DIARRHEA: ICD-10-CM

## 2024-10-22 DIAGNOSIS — Z78.9 USES ALCOHOL MODERATELY: ICD-10-CM

## 2024-10-22 DIAGNOSIS — K76.0 FATTY LIVER: ICD-10-CM

## 2024-10-22 DIAGNOSIS — K22.70 BARRETT'S ESOPHAGUS WITHOUT DYSPLASIA: ICD-10-CM

## 2024-10-22 PROBLEM — K35.30 ACUTE APPENDICITIS WITH LOCALIZED PERITONITIS, WITHOUT PERFORATION, ABSCESS, OR GANGRENE: Status: RESOLVED | Noted: 2024-07-17 | Resolved: 2024-10-22

## 2024-10-22 PROBLEM — K26.3 ACUTE DUODENAL ULCER WITHOUT HEMORRHAGE OR PERFORATION: Status: RESOLVED | Noted: 2023-07-18 | Resolved: 2024-10-22

## 2024-10-22 PROBLEM — N18.9 CHRONIC KIDNEY DISEASE, UNSPECIFIED CKD STAGE: Status: RESOLVED | Noted: 2024-07-17 | Resolved: 2024-10-22

## 2024-10-22 PROBLEM — Z12.11 SPECIAL SCREENING FOR MALIGNANT NEOPLASM OF COLON: Status: RESOLVED | Noted: 2021-05-24 | Resolved: 2024-10-22

## 2024-10-22 PROCEDURE — 99213 OFFICE O/P EST LOW 20 MIN: CPT | Performed by: FAMILY MEDICINE

## 2024-10-22 PROCEDURE — 99396 PREV VISIT EST AGE 40-64: CPT | Performed by: FAMILY MEDICINE

## 2024-10-22 RX ORDER — ROSUVASTATIN CALCIUM 5 MG/1
5 TABLET, COATED ORAL
Qty: 36 TABLET | Refills: 0 | Status: SHIPPED | OUTPATIENT
Start: 2024-10-23 | End: 2025-01-21

## 2024-10-22 RX ORDER — DICYCLOMINE HYDROCHLORIDE 10 MG/1
CAPSULE ORAL 2 TIMES DAILY PRN
Qty: 30 CAPSULE | Refills: 0 | Status: SHIPPED | OUTPATIENT
Start: 2024-10-22 | End: 2024-11-01

## 2024-10-22 NOTE — PROGRESS NOTES
Marshall HYLTON Moshe is a 54 year old male who presents for a complete physical exam.   HPI:     Asthma /prior smoker  Stable on Advair 250/50 mcg dose 1 puff once a day does not usually need the second puff.  Singulair 10 mg and Zyrtec daily.  Albuterol inhaler rare use   CT lung cancer screening ordered 24 and reprinted for him today        CAD  Quit smoking  ; Packs/day: 1.50;  Pack years: 30;  Start date: 1989; Quit date: 4/3/2009 Years since quittin.4  2023 Ultrafast CT of the heart 43.87 presently is not on a statin last liver function test 10/11/2024 AST 41 normal ALT  10/11/2024 cholesterol 183, LDL 85 goal LDL less than 70  Denies any chest pain, SOB, dizziness or fainting.  No swelling in the hands or feet.  No symptoms of PAD.    MACK  Much better   Home life is good.  Patient discontinued BuSpar since job is going better    Gandara's esophagitis  Taking pantoprazole 40 mg Gandara's esophagitis diagnosed 2022  Taking pantoprazole with no side effects and no GERD symptoms.Endoscopy done  23 by Dr. Silva repeat due in 3 year   Final Diagnosis: -Positive for Gandara's specialized intestinal metaplasia.     --Fatty liver  Elasto graphy was scheduled had to sancel will rescedule    No follow up  with hepatologist   Liver ultrasound showed fatty liver   Alcohol intake is moderate 1-2 on some nights goes 2-3 with no alcohol  and 12 on the weekends beer    CPE    -- Preventative care  Immunizations Shingrix completed, Tdap 2016, PCV 20 2023, COVID vaccine 2024, influenza vaccine 2024  Dental exams every 6 months    Eye exams UTD   No dermatology visits not done no family history of personal history of skin cancer no skin changes  Last colonoscopy completed and due 31.  No FH colon cancer.  Only gets IBS diarrhea when he eats poorly with fatty foods no blood or mucus.   Occasional if food is fatty and gets loose stool IBS diarrhea symptoms.  Metamucil daily  no probiotic yet.  Exercise and eating healthy also helps    PHQ9  No depression  Sleep Apnea  none witnessed by wife no snoring  Exercise 30 minutes of exercise feels  better with gut  Doing weights  and aerobics  3 times a week  free weights. Floor routine aerobic   DIet  healthy low processed foods avoids eating out and no soda  FH Mom DM and renal failure   No FH coronary disease or cancer denies family history stroke  Urination changes none.  BPH no sxs Nocturia x0-1.  No urgency or dribbling. No FH prostate cancer.    PSA completed 2/15/2023 1.4  ED symptoms none.   Results for orders placed or performed in visit on 10/11/24   TSH and Free T4    Collection Time: 10/11/24  7:34 AM   Result Value Ref Range    Free T4 1.4 0.8 - 1.7 ng/dL    TSH 2.753 0.550 - 4.780 mIU/mL   THYROID PEROXIDASE AND THYROGLOBULIN ANTIBODIES [7260] [Q]    Collection Time: 10/11/24  7:34 AM   Result Value Ref Range    Anti-Thyroglobulin <15 <60 U/mL    Anti-Thyroperoxidase 28 <60 U/mL   Comp Metabolic Panel (14)    Collection Time: 10/11/24  7:34 AM   Result Value Ref Range    Glucose 91 70 - 99 mg/dL    Sodium 138 136 - 145 mmol/L    Potassium 4.0 3.5 - 5.1 mmol/L    Chloride 104 98 - 112 mmol/L    CO2 24.0 21.0 - 32.0 mmol/L    Anion Gap 10 0 - 18 mmol/L    BUN 15 9 - 23 mg/dL    Creatinine 1.08 0.70 - 1.30 mg/dL    Calcium, Total 9.3 8.7 - 10.4 mg/dL    Calculated Osmolality 286 275 - 295 mOsm/kg    eGFR-Cr 82 >=60 mL/min/1.73m2    AST 41 (H) <34 U/L    ALT 39 10 - 49 U/L    Alkaline Phosphatase 66 45 - 117 U/L    Bilirubin, Total 1.5 (H) 0.3 - 1.2 mg/dL    Total Protein 7.4 5.7 - 8.2 g/dL    Albumin 4.4 3.2 - 4.8 g/dL    Globulin  3.0 2.0 - 3.5 g/dL    A/G Ratio 1.5 1.0 - 2.0    Patient Fasting for CMP? Yes    Lipid Panel    Collection Time: 10/11/24  7:34 AM   Result Value Ref Range    Cholesterol, Total 183 <200 mg/dL    HDL Cholesterol 75 (H) 40 - 59 mg/dL    Triglycerides 134 30 - 149 mg/dL    LDL Cholesterol 85 <100 mg/dL     VLDL 21 0 - 30 mg/dL    Non HDL Chol 108 <130 mg/dL    Patient Fasting for Lipid? Yes            Wt Readings from Last 6 Encounters:   10/22/24 177 lb (80.3 kg)   09/11/24 174 lb (78.9 kg)   07/17/24 175 lb (79.4 kg)   02/05/24 182 lb (82.6 kg)   11/13/23 178 lb 9.6 oz (81 kg)   10/11/23 179 lb (81.2 kg)     Body mass index is 24.69 kg/m².       Current Outpatient Medications   Medication Sig Dispense Refill    dicyclomine 10 MG Oral Cap Take 1-2 capsules (10-20 mg total) by mouth 2 (two) times daily as needed. For IBS diarrhea 30 capsule 0    [START ON 10/23/2024] rosuvastatin 5 MG Oral Tab Take 1 tablet (5 mg total) by mouth 3 (three) times a week. 36 tablet 0    Turmeric (QC TUMERIC COMPLEX OR) Take 1 capsule by mouth daily.      Milk Thistle 1000 MG Oral Cap Take 1 capsule by mouth daily with breakfast.      fluticasone-salmeterol 250-50 MCG/ACT Inhalation Aerosol Powder, Breath Activated Inhale 1 puff into the lungs 2 (two) times daily. Rinse mouth after use 3 each 1    montelukast 10 MG Oral Tab Take 1 tablet (10 mg total) by mouth nightly. 90 tablet 2    albuterol 108 (90 Base) MCG/ACT Inhalation Aero Soln Inhale 2 puffs into the lungs every 6 (six) hours as needed for Wheezing (or cough). 1 each 1    pantoprazole 40 MG Oral Tab EC Take 1 tablet (40 mg total) by mouth every morning before breakfast. 90 tablet 3    Cholecalciferol (VITAMIN D3) 25 MCG (1000 UT) Oral Cap Take 2 capsules by mouth daily.      Psyllium (METAMUCIL FIBER OR) Take 3 capsules by mouth daily.      Cetirizine HCl (ZYRTEC OR) Take 1 tablet by mouth daily.        Past Medical History:    Abdominal pain    Abscess of lung with pneumonia (HCC)    Denominational [place]    Asthma (HCC)    Blood in the stool    Chronic rhinitis    seasonal    Diverticulitis    Extrinsic asthma, unspecified     mainly issue during allergy season, well controlled    Night sweats      Past Surgical History:   Procedure Laterality Date    Colonoscopy      Other  surgical history  2009    lung abscess removal      Family History   Problem Relation Age of Onset    Musculo-skelatal Disorder Father         back issues    Lipids Father     Musculo-skelatal Disorder Mother         hip replacement    No Known Problems Sister     Asthma Daughter       Social History:  Social History    Tobacco Use      Smoking status: Former Smoker        Packs/day: 1.50        Years: 20.00        Pack years: 30        Types: Cigarettes        Start date: 1989        Quit date: 4/3/2009        Years since quittin.4      Smokeless tobacco: Former User        Types: Chew        Quit 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 Children: 1 daughter 25 y/o.   Exercise:  4 aerobic running and weights times per week.  Diet: watches fats closely and watches sugar closely     REVIEW OF SYSTEMS:   GENERAL: feels well overall, denies fever or chills, denies change in weight  SKIN: No complaints  EYES: denies changes in vision  HENT: denies upper respiratory symptoms  LUNGS: denies POOJA, wheezing, cough, orthopnea and PND  CARDIOVASCULAR: denies CP, palpitations, rapid or slow heart rate. Denies CAST/lower extremity swelling  GI: denies abdominal pain,denies heartburn, denies n/v/c/d/change in stools/blood in stool/black stool/change in appetite Gandara's esophagitis asymptomatic with pantoprazole  : denies nocturia or changes in urinary stream, denies scrotal mass/abnormal discharge from urethra  MUSCULOSKELETAL: denies joint or muscle aches or pains  NEURO: denies headaches/dizziness  PSYCH: denies depression or anxiety  HEMATOLOGIC: denies easy bruising/bleeding/anemia/blood clot disorders  ENDOCRINE: denies weight gain/weight loss/enlargement of neck  glands/polyuria/polydypsia  ALL/ASTHMA: allergic rhinitis/asthma    EXAM:   /74 (BP Location: Right arm, Patient Position: Sitting, Cuff Size: adult)   Pulse 78   Temp 98 °F (36.7 °C) (Temporal)   Resp 16   Ht 5' 11\" (1.803 m)   Wt 177 lb (80.3 kg)   SpO2 98%   BMI 24.69 kg/m²   Body mass index is 24.69 kg/m².   GENERAL: NAD, pleasant, well developed, normal voice  SKIN: no rashes, no suspicious moles or lesions   HENT: NCAT, EACs clear b/l, TMs normal right side left TM cerumen impaction, nasal turbinatess normal b/l, oropharynx with mmm/clear/normal, gingiva normal, lips normal  EYES: PERRLA, EOMI, conjunctiva non-injected and non-icteric  NECK: supple, no adenopathy/thyromegaly/thyroid nodules/masses  CHEST: no chest tenderness  BREAST: no suspicious masses appreciated on palpation  LUNGS: CTA A/P, no wheezes/ronchi/rales/crackles, normal air excursion  CARDIOVASCULAR: RRR, no murmur, no lower extremity edema, pedal and femoral pulses 2+ and symmetric b/l  GI: normoactive BS, non-distended, non-tender to palpation, no HSM/masses/pulsations  : two descended testes, no scrotal masses, no hernia, no penile lesions  RECTAL: external anal sphincter appears normal, normal rectal tone, no masses,   Prostate: smooth, normal contours, NT, normal size.        MUSCULOSKELETAL:  extremities x 4 with normal strength 5/5 and symmetric and with normal AROM/PROM.   EXTREMITIES: no cyanosis, clubbing or edema  NEURO: A&O x3, CN II-XII grossly intact. Reflexes: biceps and patellar 2+ b/l and symmetric. Gait is normal.  PSYCH: normal affect, no apparent thought disorder, average judgement and insight      Immunization History   Administered Date(s) Administered    Covid-19 Vaccine Skeed (J&J) 0.5ml 05/29/2021, 11/21/2021    Covid-19 Vaccine Pfizer Bivalent 30mcg/0.3mL 09/26/2022    FLU VAC QIV SPLIT 3 YRS AND OLDER (38407) 09/14/2019    FLUZONE 6 months and older PFS 0.5 ml (16481) 09/01/2017, 09/05/2018,  09/05/2021, 10/11/2023    Flucelvax 0.5 Ml Quad PFS Single Dose 09/21/2020    Flucelvax 0.5ml Vaccine 09/21/2020    Flucelvax Influenza vaccine, trivalent (ccIIV3), 0.5mL IM 09/21/2020, 09/16/2022    Fluvirin, 3 Years & >, Im 09/10/2015    Influenza 09/01/2016, 09/16/2022, 09/02/2024    Pfizer Covid-19 Vaccine 30mcg/0.3ml 12yrs+ 10/14/2023, 09/02/2024    Pneumococcal Conjugate PCV20 06/21/2023    Pneumovax 23 09/01/2016, 05/13/2022    TDAP 04/01/2016    Zoster Vaccine Recombinant Adjuvanted (Shingrix) 09/05/2021, 12/05/2021       ASSESSMENT AND PLAN:   Marshall Mesa is a 54 year old male who presents for a complete physical exam.   Encounter Diagnoses   Name Primary?    Well adult exam Yes    Coronary artery disease due to calcified coronary lesion     Irritable bowel syndrome with diarrhea     Gandara's esophagus without dysplasia     Fatty liver     Multiple pigmented nevi     Uses alcohol moderately        Orders Placed This Encounter   Procedures    Hepatic Function Panel (7) [E]    Lipid Panel       Meds & Refills for this Visit:  Requested Prescriptions     Signed Prescriptions Disp Refills    dicyclomine 10 MG Oral Cap 30 capsule 0     Sig: Take 1-2 capsules (10-20 mg total) by mouth 2 (two) times daily as needed. For IBS diarrhea    rosuvastatin 5 MG Oral Tab 36 tablet 0     Sig: Take 1 tablet (5 mg total) by mouth 3 (three) times a week.       Imaging & Consults:  DERM - EXTERNAL  1. Well adult exam  Recommend healthy diet including green leafy vegetables, fresh fruits and lean meats.  Aerobic exercise 30 minutes five days a week for cardiovascular fitness and 45-60 minutes 6-7 days a week for weight loss. Advised testicular self exam once a month.  Cerumen impaction use over-the-counter Cerumenex    Get lung cancer screening as planned order reprinted    2. Coronary artery disease due to calcified coronary lesion  Reviewed medication benefits and side effects.   Reviewed potential side effects of  myalgias and changes in liver function tests repeat lipids and liver function tests in 2 months  - rosuvastatin 5 MG Oral Tab; Take 1 tablet (5 mg total) by mouth 3 (three) times a week.  Dispense: 36 tablet; Refill: 0  - Hepatic Function Panel (7) [E]; Future  - Lipid Panel; Future    3. Irritable bowel syndrome with diarrhea caused from fatty diet  Continue with fiber, increase water and exercise daily  Reviewed medication benefits and side effects.   Trial of Bentyl happens when he has fatty foods and alcohol triggers symptoms.  - dicyclomine 10 MG Oral Cap; Take 1-2 capsules (10-20 mg total) by mouth 2 (two) times daily as needed. For IBS diarrhea  Dispense: 30 capsule; Refill: 0    4. Gandara's esophagus without dysplasia  Fatty liver  Continue with pantoprazole 40 mg  Decrease and avoid alcohol due to increased risk for liver cirrhosis and esophageal cancer  Get additional testing ordered by hepatologist liver elastography    5. Multiple pigmented nevi  - Derm Referral - External    6. Uses alcohol moderately  Alcohol cessation discussed and importance of reduction in order to avoid complications liver cancer and cirrhosis  Has anxiety tried BuSpar had side effects told him to consider sertraline or fluoxetine.    7. Encounter for screening prostate specific antigen (PSA) measurement  - PSA Total, Screen; Future        The patient verbalizes understanding of these recommendations and agrees to the plan.  The patient is asked to return for asthma every 6 months

## 2024-12-07 ENCOUNTER — HOSPITAL ENCOUNTER (OUTPATIENT)
Dept: CT IMAGING | Facility: HOSPITAL | Age: 54
Discharge: HOME | End: 2024-12-07
Attending: FAMILY MEDICINE
Payer: COMMERCIAL

## 2024-12-07 DIAGNOSIS — Z12.2 SCREENING FOR LUNG CANCER: ICD-10-CM

## 2024-12-07 DIAGNOSIS — Z87.891 HISTORY OF PRIOR CIGARETTE SMOKING: ICD-10-CM

## 2024-12-07 PROCEDURE — 71271 CT THORAX LUNG CANCER SCR C-: CPT | Performed by: FAMILY MEDICINE

## 2024-12-09 NOTE — PROGRESS NOTES
No evidence of primary lung cancer.    RECOMMENDATIONS:    LUNG-RADS 1: Continued routine annual LDCT lung screening.  Suggest next exam on or around 12/7/2025.

## 2025-01-12 DIAGNOSIS — I25.84 CORONARY ARTERY DISEASE DUE TO CALCIFIED CORONARY LESION: ICD-10-CM

## 2025-01-12 DIAGNOSIS — I25.10 CORONARY ARTERY DISEASE DUE TO CALCIFIED CORONARY LESION: ICD-10-CM

## 2025-01-13 RX ORDER — ROSUVASTATIN CALCIUM 5 MG/1
5 TABLET, COATED ORAL
Qty: 36 TABLET | Refills: 0 | Status: SHIPPED | OUTPATIENT
Start: 2025-01-13

## 2025-01-16 RX ORDER — PANTOPRAZOLE SODIUM 40 MG/1
40 TABLET, DELAYED RELEASE ORAL
Qty: 30 TABLET | Refills: 0 | Status: SHIPPED | OUTPATIENT
Start: 2025-01-16

## 2025-02-06 ENCOUNTER — HOSPITAL ENCOUNTER (EMERGENCY)
Age: 55
Discharge: HOME | End: 2025-02-06
Payer: COMMERCIAL

## 2025-02-06 ENCOUNTER — APPOINTMENT (OUTPATIENT)
Dept: GENERAL RADIOLOGY | Age: 55
End: 2025-02-06
Payer: COMMERCIAL

## 2025-02-06 VITALS
BODY MASS INDEX: 25.77 KG/M2 | HEART RATE: 87 BPM | WEIGHT: 180 LBS | TEMPERATURE: 99 F | SYSTOLIC BLOOD PRESSURE: 130 MMHG | DIASTOLIC BLOOD PRESSURE: 88 MMHG | HEIGHT: 70 IN | RESPIRATION RATE: 16 BRPM | OXYGEN SATURATION: 95 %

## 2025-02-06 DIAGNOSIS — J11.1 INFLUENZA: Primary | ICD-10-CM

## 2025-02-06 LAB
POCT INFLUENZA A: POSITIVE
POCT INFLUENZA B: NEGATIVE
SARS-COV-2 RNA RESP QL NAA+PROBE: NOT DETECTED

## 2025-02-06 PROCEDURE — 99284 EMERGENCY DEPT VISIT MOD MDM: CPT

## 2025-02-06 PROCEDURE — 87502 INFLUENZA DNA AMP PROBE: CPT

## 2025-02-06 PROCEDURE — 71046 X-RAY EXAM CHEST 2 VIEWS: CPT

## 2025-02-06 RX ORDER — CODEINE PHOSPHATE AND GUAIFENESIN 10; 100 MG/5ML; MG/5ML
5 SOLUTION ORAL EVERY 6 HOURS PRN
Qty: 118 ML | Refills: 0 | Status: SHIPPED | OUTPATIENT
Start: 2025-02-06

## 2025-02-06 NOTE — ED PROVIDER NOTES
Patient Seen in: [facility] In [place]      History     Chief Complaint   Patient presents with    Cough/URI     Stated Complaint: cough up blood, chest tightness,    Subjective:   HPI      54-year-old male.  4 days prior to arrival, patient had rapid onset of malaise, myalgia, headache, nasal congestion, sore throat and cough.  These symptoms have persisted.  This morning, there was a scant amount of possible blood in his sputum.  He was concerned that he might be developing pneumonia as he has previously experienced this.  He denies any vomiting or diarrhea.      Objective:     Past Medical History:    Abdominal pain    Abscess of lung with pneumonia (HCC)    [employer] [place]    Acute duodenal ulcer without hemorrhage or perforation    Asthma (HCC)    Blood in the stool    Chronic rhinitis    seasonal    Diverticulitis    Extrinsic asthma, unspecified     mainly issue during allergy season, well controlled    Night sweats              Past Surgical History:   Procedure Laterality Date    Colonoscopy      Other surgical history  2009    lung abscess removal                Social History     Socioeconomic History    Marital status:    Tobacco Use    Smoking status: Former     Current packs/day: 0.00     Average packs/day: 1.5 packs/day for 20.0 years (30.1 ttl pk-yrs)     Types: Cigarettes     Start date: 4/25/1989     Quit date: 4/3/2009     Years since quitting: 15.8    Smokeless tobacco: Former     Types: Chew     Quit date: 1/1/2009   Vaping Use    Vaping status: Never Used   Substance and Sexual Activity    Alcohol use: Yes     Alcohol/week: 12.0 standard drinks of alcohol     Types: 12 Cans of beer per week     Comment: 6-12 beers weekly    Drug use: Not Currently     Types: Cannabis   Other Topics Concern     Service No    Blood Transfusions No    Caffeine Concern No    Occupational Exposure Yes     Comment: Dust, Fumes, Noise, Second-hand smoke, & Solvents    Hobby  Hazards No    Sleep Concern Yes    Stress Concern No    Weight Concern No    Special Diet No    Back Care No    Exercise Yes     Comment: 3-4 x weekly    Bike Helmet No    Seat Belt Yes    Self-Exams No     Social Drivers of Health     Food Insecurity: No Food Insecurity (7/17/2024)    Food Insecurity     Food Insecurity: Never true   Transportation Needs: No Transportation Needs (7/17/2024)    Transportation Needs     Lack of Transportation: No   Housing Stability: Low Risk  (7/17/2024)    Housing Stability     Housing Instability: No                  Physical Exam     ED Triage Vitals   BP 02/06/25 1357 130/88   Pulse 02/06/25 1357 87   Resp 02/06/25 1357 16   Temp 02/06/25 1356 98.4 °F (36.9 °C)   Temp src 02/06/25 1356 Oral   SpO2 02/06/25 1357 95 %   O2 Device 02/06/25 1357 None (Room air)       Current Vitals:   Vital Signs  BP: 130/88  Pulse: 87  Resp: 16  Temp: 98.6 °F (37 °C)  Temp src: Oral    Oxygen Therapy  SpO2: 95 %  O2 Device: None (Room air)        Physical Exam     Gen: Well appearing, well groomed, alert and aware x 3  Neck: Supple, full range of motion, no thyromegaly or lymphadenopathy.  Eye examination: EOMs are intact, normal conjunctival  ENT:  moderate audible nasal congestion.  Very frequent sniffling and throat clearing.  Copious clear rhinorrhea.  Oropharynx visually benign.  Lung: No distress, RR, no retraction, breath sounds are clear bilaterally  Cardio: Regular rate and rhythm, normal S1-S2, no murmur appreciable  Skin: No sign of trauma, Skin warm and dry, no induration or sign of infection.  No rash noted    ED Course     Labs Reviewed   POCT FLU TEST - Abnormal; Notable for the following components:       Result Value    POCT INFLUENZA A Positive (*)     All other components within normal limits    Narrative:     This assay is a rapid molecular in vitro test utilizing nucleic acid amplification of influenza A and B viral RNA.   RAPID SARS-COV-2 BY PCR - Normal            XR CHEST PA  + LAT CHEST (VKZ=81968)    Result Date: 2/6/2025  PROCEDURE:  XR CHEST PA + LAT CHEST (CPT=71046)  INDICATIONS:  cough up blood, chest tightness,  COMPARISON:  None.  TECHNIQUE:  PA and lateral chest radiographs were obtained.  PATIENT STATED HISTORY: (As transcribed by Technologist)  Patient has been feeling sick since 2/1/25, but starting getting worse this morning. He is coughing up blood, fatigued, short of breath, and feeling like his chest is tight. He also was dizzy this  morning. History of asthma, and had an abcess on his lung removed in 2009.    FINDINGS:  Cardiac size and pulmonary vasculature are within normal limits. No pleural effusions. No pneumothorax.  Hyperexpansion of the lungs.            CONCLUSION:  Hyperexpansion lungs.  Minimal bibasilar atelectasis.   LOCATION:  [facility]   Dictated by (CST): Renetta Ohara MD on 2/06/2025 at 2:52 PM     Finalized by (CST): Renetta Ohara MD on 2/06/2025 at 2:53 PM             MDM        Patient is afebrile.  No tachycardia.  95% room air.  No hypotension.  Breath sounds are clear.  While in triage she had influenza and COVID testing performed.  He was also sent for chest x-ray    Patient returned positive for influenza.  He is currently on day 4 or 5 of symptoms.  He does have moderate nasal congestion and very frequent sniffling and throat clearing.  The small amount of possible blood was likely secondary to mucosal irritation.  His chest x-ray demonstrates no acute consolidation     CONCLUSION:  Hyperexpansion lungs.  Minimal bibasilar atelectasis.   LOCATION:  [facility]   Dictated by (CST): Renetta Ohara MD on 2/06/2025 at 2:52 PM     Finalized by (CST): Renetta Ohara MD on 2/06/2025 at 2:53 PM        We discussed typical duration of influenza.  Patient was provided with Cheratussin.  He is to quarantine.  Alternate Tylenol and Motrin every 4 hours.  Push clear fluids.    Medical Decision Making      Disposition and Plan     Clinical Impression:  1.  Influenza         Disposition:  Discharge  2/6/2025  4:19 pm    Follow-up:  Yana Campoverde DO  [id number] [facility] [address]  [place] [id number] [phone]    Follow up            Medications Prescribed:  Current Discharge Medication List        START taking these medications    Details   guaiFENesin-codeine 100-10 MG/5ML Oral Solution Take 5 mL by mouth every 6 (six) hours as needed for cough.  Qty: 118 mL, Refills: 0    Associated Diagnoses: Influenza                 Supplementary Documentation:

## 2025-02-06 NOTE — DISCHARGE INSTRUCTIONS
Influenza typically last 7 to 10 days.  Be prepared for further fever.  Alternate Tylenol Motrin for hours.  Push clear fluids.  Quarantine.    Cheratussin is a potent cough suppressant which contains a narcotic coating.  Do not drive on this medication.  Do not take additional Mucinex when taking this cough syrup.

## 2025-02-06 NOTE — ED INITIAL ASSESSMENT (HPI)
Cough/congestion since Saturday- noticed blood when coughing this am- small amt- has had this in the past and it was pneumonia- wife also sick at home

## 2025-02-12 RX ORDER — PANTOPRAZOLE SODIUM 40 MG/1
40 TABLET, DELAYED RELEASE ORAL
Qty: 30 TABLET | Refills: 0 | Status: SHIPPED | OUTPATIENT
Start: 2025-02-12

## 2025-03-17 RX ORDER — PANTOPRAZOLE SODIUM 40 MG/1
40 TABLET, DELAYED RELEASE ORAL
Qty: 30 TABLET | Refills: 0 | Status: SHIPPED | OUTPATIENT
Start: 2025-03-17

## 2025-03-31 ENCOUNTER — OFFICE VISIT (OUTPATIENT)
Dept: FAMILY MEDICINE CLINIC | Facility: CLINIC | Age: 55
End: 2025-03-31
Payer: COMMERCIAL

## 2025-03-31 VITALS
BODY MASS INDEX: 25.2 KG/M2 | SYSTOLIC BLOOD PRESSURE: 130 MMHG | RESPIRATION RATE: 18 BRPM | HEART RATE: 94 BPM | HEIGHT: 71 IN | WEIGHT: 180 LBS | TEMPERATURE: 98 F | OXYGEN SATURATION: 98 % | DIASTOLIC BLOOD PRESSURE: 84 MMHG

## 2025-03-31 DIAGNOSIS — J06.9 UPPER RESPIRATORY TRACT INFECTION, UNSPECIFIED TYPE: Primary | ICD-10-CM

## 2025-03-31 DIAGNOSIS — Z87.09 HISTORY OF ASTHMA: ICD-10-CM

## 2025-03-31 LAB
OPERATOR ID: NORMAL
POCT LOT NUMBER: NORMAL
RAPID SARS-COV-2 BY PCR: NOT DETECTED

## 2025-03-31 PROCEDURE — U0002 COVID-19 LAB TEST NON-CDC: HCPCS | Performed by: NURSE PRACTITIONER

## 2025-03-31 PROCEDURE — 99213 OFFICE O/P EST LOW 20 MIN: CPT | Performed by: NURSE PRACTITIONER

## 2025-03-31 RX ORDER — PREDNISONE 20 MG/1
40 TABLET ORAL DAILY
Qty: 10 TABLET | Refills: 0 | Status: SHIPPED | OUTPATIENT
Start: 2025-03-31 | End: 2025-04-05

## 2025-03-31 NOTE — PROGRESS NOTES
CHIEF COMPLAINT:     Chief Complaint   Patient presents with    Upper Respiratory Infection     X 3 days       HPI:   Marshall Mesa is a 54 year old male who presents for upper respiratory symptoms for  3 days. Patient reports congestion, dry cough, fatigue, sore throat, body aches . Symptoms have been unchanged since onset.  Treating symptoms with otc medications.      Current Outpatient Medications   Medication Sig Dispense Refill    predniSONE 20 MG Oral Tab Take 2 tablets (40 mg total) by mouth daily for 5 days. 10 tablet 0    PANTOPRAZOLE 40 MG Oral Tab EC TAKE 1 TABLET BY MOUTH IN THE MORNING BEFORE BREAKFAST 30 tablet 0    guaiFENesin-codeine 100-10 MG/5ML Oral Solution Take 5 mL by mouth every 6 (six) hours as needed for cough. 118 mL 0    ROSUVASTATIN 5 MG Oral Tab TAKE 1 TABLET BY MOUTH THREE TIMES A WEEK 36 tablet 0    Turmeric (QC TUMERIC COMPLEX OR) Take 1 capsule by mouth daily.      Milk Thistle 1000 MG Oral Cap Take 1 capsule by mouth daily with breakfast.      fluticasone-salmeterol 250-50 MCG/ACT Inhalation Aerosol Powder, Breath Activated Inhale 1 puff into the lungs 2 (two) times daily. Rinse mouth after use 3 each 1    montelukast 10 MG Oral Tab Take 1 tablet (10 mg total) by mouth nightly. 90 tablet 2    albuterol 108 (90 Base) MCG/ACT Inhalation Aero Soln Inhale 2 puffs into the lungs every 6 (six) hours as needed for Wheezing (or cough). 1 each 1    Cholecalciferol (VITAMIN D3) 25 MCG (1000 UT) Oral Cap Take 2 capsules by mouth daily.      Psyllium (METAMUCIL FIBER OR) Take 3 capsules by mouth daily.      Cetirizine HCl (ZYRTEC OR) Take 1 tablet by mouth daily.        Past Medical History:    Abdominal pain    Abscess of lung with pneumonia (HCC)    [employer] [place]    Acute duodenal ulcer without hemorrhage or perforation    Asthma (HCC)    Blood in the stool    Chronic rhinitis    seasonal    Diverticulitis    Extrinsic asthma, unspecified     mainly issue during allergy season, well  controlled    Night sweats      Past Surgical History:   Procedure Laterality Date    Colonoscopy      Other surgical history      lung abscess removal         Social History     Socioeconomic History    Marital status:    Tobacco Use    Smoking status: Former     Current packs/day: 0.00     Average packs/day: 1.5 packs/day for 20.0 years (30.1 ttl pk-yrs)     Types: Cigarettes     Start date: 1989     Quit date: 4/3/2009     Years since quittin.0    Smokeless tobacco: Former     Types: Chew     Quit date: 2009   Vaping Use    Vaping status: Never Used   Substance and Sexual Activity    Alcohol use: Yes     Alcohol/week: 12.0 standard drinks of alcohol     Types: 12 Cans of beer per week     Comment: 6-12 beers weekly    Drug use: Not Currently     Types: Cannabis   Other Topics Concern     Service No    Blood Transfusions No    Caffeine Concern No    Occupational Exposure Yes     Comment: Dust, Fumes, Noise, Second-hand smoke, & Solvents    Hobby Hazards No    Sleep Concern Yes    Stress Concern No    Weight Concern No    Special Diet No    Back Care No    Exercise Yes     Comment: 3-4 x weekly    Bike Helmet No    Seat Belt Yes    Self-Exams No     Social Drivers of Health     Food Insecurity: No Food Insecurity (2024)    Food Insecurity     Food Insecurity: Never true   Transportation Needs: No Transportation Needs (2024)    Transportation Needs     Lack of Transportation: No   Housing Stability: Low Risk  (2024)    Housing Stability     Housing Instability: No         REVIEW OF SYSTEMS:   GENERAL: decreased appetite  SKIN: no rashes or abnormal skin lesions  HEENT: See HPI  LUNGS: See HPI  CARDIOVASCULAR: denies chest pain or palpitations   GI: denies N/V/C or abdominal pain      EXAM:   /84   Pulse 94   Temp 97.6 °F (36.4 °C)   Resp 18   Ht 5' 11\" (1.803 m)   Wt 180 lb (81.6 kg)   SpO2 98%   BMI 25.10 kg/m²   GENERAL: well developed, well nourished,in  no apparent distress  SKIN: no rashes,no suspicious lesions  HEAD: atraumatic, normocephalic.  no tenderness on palpation of  sinuses  EYES: conjunctiva clear, EOM intact  EARS: TM's pearly, no bulging, no retraction,no fluid, bony landmarks visible  NOSE: Nostrils patent, + nasal discharge, nasal mucosa red and inflamed   THROAT: Oral mucosa pink, moist. Posterior pharynx is not erythematous. no exudates. Tonsils 1/4.    NECK: Supple, non-tender  LUNGS: clear to auscultation bilaterally, no wheezes or rhonchi. Breathing is non labored.  CARDIO: RRR without murmur  EXTREMITIES: no cyanosis, clubbing or edema  LYMPH:  no lymphadenopathy.        ASSESSMENT AND PLAN:   Marshall Mesa is a 54 year old male who presents with upper respiratory symptoms that are consistent with    ASSESSMENT:   Encounter Diagnoses   Name Primary?    Upper respiratory tract infection, unspecified type Yes    History of asthma        PLAN: Meds as below.  Comfort care as described in Patient Instructions  Educated on viral vs bacterial  Educated on supportive measures: ibuprofen/tylenol, hydration,  Educated on s/s of worsening sx and when to seek higher level of care  Follow up with pcp if not improving      Meds & Refills for this Visit:  Requested Prescriptions     Signed Prescriptions Disp Refills    predniSONE 20 MG Oral Tab 10 tablet 0     Sig: Take 2 tablets (40 mg total) by mouth daily for 5 days.     Risks, benefits, and side effects of medication explained and discussed.    The patient indicates understanding of these issues and agrees to the plan.  The patient is asked to f/u with PCP if sx's persist or worsen.  There are no Patient Instructions on file for this visit.

## 2025-04-05 DIAGNOSIS — J45.40 MODERATE PERSISTENT ASTHMA WITHOUT COMPLICATION (HCC): ICD-10-CM

## 2025-04-05 DIAGNOSIS — I25.10 CORONARY ARTERY DISEASE DUE TO CALCIFIED CORONARY LESION: ICD-10-CM

## 2025-04-05 DIAGNOSIS — I25.84 CORONARY ARTERY DISEASE DUE TO CALCIFIED CORONARY LESION: ICD-10-CM

## 2025-04-07 RX ORDER — ROSUVASTATIN CALCIUM 5 MG/1
5 TABLET, COATED ORAL
Qty: 36 TABLET | Refills: 0 | Status: SHIPPED | OUTPATIENT
Start: 2025-04-07

## 2025-04-07 RX ORDER — ALBUTEROL SULFATE 90 UG/1
2 INHALANT RESPIRATORY (INHALATION) EVERY 6 HOURS PRN
Qty: 9 G | Refills: 0 | Status: SHIPPED | OUTPATIENT
Start: 2025-04-07

## 2025-04-14 RX ORDER — PANTOPRAZOLE SODIUM 40 MG/1
40 TABLET, DELAYED RELEASE ORAL
Qty: 30 TABLET | Refills: 0 | Status: SHIPPED | OUTPATIENT
Start: 2025-04-14

## 2025-05-13 RX ORDER — PANTOPRAZOLE SODIUM 40 MG/1
40 TABLET, DELAYED RELEASE ORAL
Qty: 30 TABLET | Refills: 0 | Status: SHIPPED | OUTPATIENT
Start: 2025-05-13

## 2025-05-13 NOTE — TELEPHONE ENCOUNTER
PSR please call patient to assist in scheduling a follow up appointment for asthma. LOV 10/22/24.

## 2025-05-16 ENCOUNTER — PATIENT MESSAGE (OUTPATIENT)
Dept: FAMILY MEDICINE CLINIC | Facility: CLINIC | Age: 55
End: 2025-05-16

## 2025-05-16 RX ORDER — PANTOPRAZOLE SODIUM 40 MG/1
40 TABLET, DELAYED RELEASE ORAL
Qty: 30 TABLET | Refills: 0 | Status: CANCELLED
Start: 2025-05-16

## 2025-05-16 RX ORDER — PANTOPRAZOLE SODIUM 40 MG/1
40 TABLET, DELAYED RELEASE ORAL
Qty: 30 TABLET | Refills: 0 | OUTPATIENT
Start: 2025-05-16

## 2025-06-04 DIAGNOSIS — J45.40 MODERATE PERSISTENT ASTHMA WITHOUT COMPLICATION (HCC): ICD-10-CM

## 2025-06-05 RX ORDER — FLUTICASONE PROPIONATE AND SALMETEROL 250; 50 UG/1; UG/1
1 POWDER RESPIRATORY (INHALATION) 2 TIMES DAILY
Qty: 180 EACH | Refills: 1 | Status: SHIPPED | OUTPATIENT
Start: 2025-06-05

## 2025-06-05 NOTE — TELEPHONE ENCOUNTER
Requested Prescriptions     Pending Prescriptions Disp Refills    FLUTICASONE-SALMETEROL 250-50 MCG/ACT Inhalation Aerosol Powder, Breath Activated [Pharmacy Med Name: Fluticasone-Salmeterol 250-50 MCG/DOSE Inhalation Aerosol Powder Breath Activated] 180 each 0     Sig: INHALE 1 PUFF BY MOUTH TWICE DAILY RINSE MOUTH AFTER USE       Last Refill: 9/11/24    Last OV: 10/22/24    Next OV: 11/19

## 2025-06-06 ENCOUNTER — PATIENT MESSAGE (OUTPATIENT)
Dept: FAMILY MEDICINE CLINIC | Facility: CLINIC | Age: 55
End: 2025-06-06

## 2025-06-10 RX ORDER — PANTOPRAZOLE SODIUM 40 MG/1
40 TABLET, DELAYED RELEASE ORAL
Qty: 30 TABLET | Refills: 0 | Status: SHIPPED | OUTPATIENT
Start: 2025-06-10 | End: 2025-06-16

## 2025-06-12 NOTE — TELEPHONE ENCOUNTER
Either in person or virtual I am OK with him coming in I may be a little behind by that time of the day just let him know.

## 2025-06-16 ENCOUNTER — PATIENT MESSAGE (OUTPATIENT)
Dept: FAMILY MEDICINE CLINIC | Facility: CLINIC | Age: 55
End: 2025-06-16

## 2025-06-16 RX ORDER — PANTOPRAZOLE SODIUM 40 MG/1
40 TABLET, DELAYED RELEASE ORAL
Qty: 30 TABLET | Refills: 0 | Status: SHIPPED | OUTPATIENT
Start: 2025-06-16

## 2025-06-16 NOTE — TELEPHONE ENCOUNTER
Patient has six month follow up for chronic issues on 06/23/2025 okay to fill medication for 30 days ?

## 2025-06-23 ENCOUNTER — TELEMEDICINE (OUTPATIENT)
Dept: FAMILY MEDICINE CLINIC | Facility: CLINIC | Age: 55
End: 2025-06-23
Payer: COMMERCIAL

## 2025-06-23 DIAGNOSIS — I25.10 CORONARY ARTERY DISEASE DUE TO CALCIFIED CORONARY LESION: ICD-10-CM

## 2025-06-23 DIAGNOSIS — Z12.2 ENCOUNTER FOR SCREENING FOR LUNG CANCER: ICD-10-CM

## 2025-06-23 DIAGNOSIS — I25.84 CORONARY ARTERY DISEASE DUE TO CALCIFIED CORONARY LESION: ICD-10-CM

## 2025-06-23 DIAGNOSIS — J45.40 MODERATE PERSISTENT ASTHMA WITHOUT COMPLICATION (HCC): Primary | ICD-10-CM

## 2025-06-23 DIAGNOSIS — K22.70 BARRETT'S ESOPHAGUS WITHOUT DYSPLASIA: ICD-10-CM

## 2025-06-23 DIAGNOSIS — Z87.891 SMOKING HISTORY: ICD-10-CM

## 2025-06-23 PROCEDURE — 99214 OFFICE O/P EST MOD 30 MIN: CPT | Performed by: FAMILY MEDICINE

## 2025-06-23 RX ORDER — FLUTICASONE PROPIONATE AND SALMETEROL 250; 50 UG/1; UG/1
1 POWDER RESPIRATORY (INHALATION) 2 TIMES DAILY
Qty: 180 EACH | Refills: 1 | Status: CANCELLED
Start: 2025-06-23

## 2025-06-23 RX ORDER — MONTELUKAST SODIUM 10 MG/1
10 TABLET ORAL NIGHTLY
Qty: 90 TABLET | Refills: 3 | Status: SHIPPED | OUTPATIENT
Start: 2025-06-23

## 2025-06-23 RX ORDER — PANTOPRAZOLE SODIUM 40 MG/1
40 TABLET, DELAYED RELEASE ORAL
Qty: 30 TABLET | Refills: 0 | Status: SHIPPED | OUTPATIENT
Start: 2025-06-23

## 2025-06-23 RX ORDER — ROSUVASTATIN CALCIUM 5 MG/1
5 TABLET, COATED ORAL
Qty: 36 TABLET | Refills: 2 | Status: SHIPPED | OUTPATIENT
Start: 2025-06-23

## 2025-06-23 RX ORDER — ALBUTEROL SULFATE 90 UG/1
2 INHALANT RESPIRATORY (INHALATION) EVERY 6 HOURS PRN
Qty: 9 G | Refills: 0 | Status: CANCELLED
Start: 2025-06-23

## 2025-06-23 NOTE — PROGRESS NOTES
Marshall Mesa is a 55 year old male.  HPI:   Please note that the following visit was completed using two-way, real-time interactive audio and video communication.  This has been done in good pablo to provide continuity of care in the best interest of the provider-patient relationship,  There are limitations of this visit as no physical exam could be performed.  Every conscious effort was taken to allow for sufficient and adequate time.  This billing was spent on reviewing labs, medications, radiology tests and decision making.  Appropriate medical decision-making and tests are ordered as detailed in the plan of care above.         The following individual(s) verbally consented to be recorded using ambient AI listening technology and understand that they can each withdraw their consent to this listening technology at any point by asking the clinician to turn off or pause the recording:    Patient name: Marshall Mesa  History of Present Illness  Marshall Mesa is a 55 year old male with asthma who presents for a routine follow-up visit.  Needing refills on rosuvastatin, Singulair and pantoprazole    Audiovideo call with patient    Asthma is well-controlled with infrequent need for an inhaler. He uses Advair once daily, increasing to twice daily if needed, such as during exposure to smoke from campfires or recent [place] wildfires. He continues to exercise regularly without exacerbation of symptoms.    Advair 250/50 mcg dose 1 puff once a day does not usually need the second puff.    Singulair 10 mg and Zyrtec daily.  Quit smoking    Packs/day: 1.50        Years: 20.00        Pack years: 30        Types: Cigarettes        Start date: 1989        Quit date: 4/3/2009        Years since quittin.4  ACT is at a 24    2024 CT lung LD screening no evidence of lung cancer repeat in 1 year    He maintains a consistent exercise routine, working out approximately 24 days a month, with sessions averaging  40 minutes. His routine includes a mix of aerobic and weight training exercises, which he finds beneficial for his overall health.    Alcohol consumption has decreased during the week, with most intake occurring on weekends. He estimates consuming between six and twelve beers weekly, primarily [employer] Light.    His diet consists mainly of chicken, grilled potatoes, and carrots, with occasional steak and burgers. He avoids fast food and drinks at least 80 ounces of water daily.    No abdominal pain, reflux, or urination issues. He experienced one day of fatigue and mild stomach discomfort about a month ago, which resolved without missing work.    He sleeps approximately seven hours per night and denies any issues with sleep apnea or significant snoring.    He quit smoking in 2009 after a 20-year history of smoking about a pack a day.    Did not complete labs ordered 10/22/2024 PSA, lipid, LFT  Prior labs 10/11/2024 lipids, CMP, TSH with elevated AST of 41 darrius less than  34 and elevated bilirubin 1.5    CT calcium scoring elevated 43.87 total score was started on rosuvastatin 5 mg 10/23/2024 due to LDL goal being a less than 70 and on 10/11/2024 LDL 85      Current Medications[1]   Past Medical History[2]   Social History:  Short Social Hx on File[3]     REVIEW OF SYSTEMS:   GENERAL HEALTH: feels well otherwise  SKIN: denies any unusual skin lesions or rashes  RESPIRATORY: denies shortness of breath with exertion  CARDIOVASCULAR: denies chest pain on exertion  GI: denies abdominal pain and denies heartburn  NEURO: denies headaches  Musculoskeletal: No motor deficits  Asthma see above  EXAM:   No vitals taken due to tele-visit.  25 minutes time was spent reviewing patient's inquiry, patient's record including prior labs, developing management plan and ordering tests and prescriptions.    Full exam was not done secondary to virtual visit.  Reviewed medications, problem list and allergies.  GENERAL: Patient is speaking  in full sentences no increased work in breathing patient is alert and orientated x3.    Psych patient's mood is normal and communication skills are good    ASSESSMENT AND PLAN:     Encounter Diagnoses   Name Primary?    Moderate persistent asthma without complication (HCC) Yes    Gandara's esophagus without dysplasia     Coronary artery disease due to calcified coronary lesion     Encounter for screening for lung cancer     Smoking history        No orders of the defined types were placed in this encounter.      Meds & Refills for this Visit:  Requested Prescriptions     Signed Prescriptions Disp Refills    montelukast 10 MG Oral Tab 90 tablet 3     Sig: Take 1 tablet (10 mg total) by mouth nightly.    pantoprazole 40 MG Oral Tab EC 30 tablet 0     Sig: Take 1 tablet (40 mg total) by mouth before breakfast.    rosuvastatin 5 MG Oral Tab 36 tablet 2     Sig: Take 1 tablet (5 mg total) by mouth 3 (three) times a week.       Imaging & Consults:  CT LUNG LD SCREENING(CPT=71271)        Assessment & Plan  Moderate persistent asthma without complication (HCC)  Asthma is well-controlled with minimal symptoms. He occasionally increases Advair to twice daily during smoke exposure, effectively managing symptoms. Regular exercise is maintained without exacerbation.  - Continue Advair once daily, increase to twice daily as needed during smoke exposure  - Ensure availability of albuterol inhaler  - Continue montelukast nightly    Coronary artery disease due to calcified coronary lesion  Hyperlipidemia is managed with rosuvastatin, which is well-tolerated without side effects. He adheres to a healthy diet and exercise routine, supporting lipid management.  - Continue rosuvastatin three times a week  - Order fasting lipid panel    History of smoking lung cancer screening   He quit smoking in 2009 after a 20-year history of smoking a pack a day. Annual CT lung screening is performed to monitor for potential lung cancer. He is  asymptomatic and agrees to continue with annual screenings. Discussed benefits of early detection through screening, leading to early intervention and better outcomes.  - Schedule CT lung screening for December 7, 2025  CT LUNG LD SCREENING(CPT=71271); Future  General Health Maintenance  He maintains a healthy lifestyle with regular exercise, a balanced diet, and reduced alcohol consumption. He is compliant with medications and preventive health measures.  - Order PSA, liver function tests, and cholesterol blood tests  - Ensure follow-up for blood work  - Continue healthy lifestyle with regular exercise and balanced diet  - Maintain reduced alcohol consumption  Gandara's esophagus without dysplasia  Continue with pantoprazole 40 mg daily has not had any reflux avoids nonsteroidals does drink alcohol several week has cut back since last visit continue to reduce alcohol intake    Time spent was 30 minutes more than 50% was spent on counseling regarding medical conditions and treatment.  Rest of time was spent reviewing chart, reviewing blood work and radiology tests.     The patient indicates understanding of these issues and agrees to the plan.  The patient is asked to return 11/19/2025 or as needed.    This note was created by [employer] voice recognition. Errors in content may be related to improper recognition by the system; efforts to review and correct have been done but errors may still exist.  Note to patient: The 21st Century Cures Act makes medical notes like these available to patients in the interest of transparency. However, be advised this is a medical document. It is intended as peer to peer communication. It is written in medical language and may contain abbreviations or verbiage that are unfamiliar. It may appear blunt or direct. Medical documents are intended to carry relevant information, facts as evident, and the clinical opinion of the practitioner.             [1]   Current Outpatient Medications    Medication Sig Dispense Refill    montelukast 10 MG Oral Tab Take 1 tablet (10 mg total) by mouth nightly. 90 tablet 3    pantoprazole 40 MG Oral Tab EC Take 1 tablet (40 mg total) by mouth before breakfast. 30 tablet 0    rosuvastatin 5 MG Oral Tab Take 1 tablet (5 mg total) by mouth 3 (three) times a week. 36 tablet 2    fluticasone-salmeterol 250-50 MCG/ACT Inhalation Aerosol Powder, Breath Activated INHALE 1 PUFF BY MOUTH TWICE DAILY RINSE MOUTH AFTER  each 1    ALBUTEROL 108 (90 Base) MCG/ACT Inhalation Aero Soln INHALE 2 PUFFS BY MOUTH EVERY 6 HOURS AS NEEDED FOR WHEEZING OR COUGH 9 g 0    guaiFENesin-codeine 100-10 MG/5ML Oral Solution Take 5 mL by mouth every 6 (six) hours as needed for cough. 118 mL 0    Turmeric (QC TUMERIC COMPLEX OR) Take 1 capsule by mouth daily.      Milk Thistle 1000 MG Oral Cap Take 1 capsule by mouth daily with breakfast.      Cholecalciferol (VITAMIN D3) 25 MCG (1000 UT) Oral Cap Take 2 capsules by mouth daily.      Psyllium (METAMUCIL FIBER OR) Take 3 capsules by mouth daily.      Cetirizine HCl (ZYRTEC OR) Take 1 tablet by mouth daily.     [2]   Past Medical History:   Abdominal pain    Abscess of lung with pneumonia (HCC)    [employer] [place]    Acute duodenal ulcer without hemorrhage or perforation    Asthma (HCC)    Blood in the stool    Chronic rhinitis    seasonal    Diverticulitis    Extrinsic asthma, unspecified     mainly issue during allergy season, well controlled    Night sweats   [3]   Social History  Socioeconomic History    Marital status:    Tobacco Use    Smoking status: Former     Current packs/day: 0.00     Average packs/day: 1.5 packs/day for 20.0 years (30.1 ttl pk-yrs)     Types: Cigarettes     Start date: 1989     Quit date: 4/3/2009     Years since quittin.2     Passive exposure: Never    Smokeless tobacco: Former     Types: Chew     Quit date: 2009   Vaping Use    Vaping status: Never Used   Substance and Sexual Activity     Alcohol use: Yes     Alcohol/week: 12.0 standard drinks of alcohol     Types: 12 Cans of beer per week     Comment: 6-12 beers weekly    Drug use: Not Currently     Types: Cannabis    Sexual activity: Not Currently   Other Topics Concern     Service No    Blood Transfusions No    Caffeine Concern No    Occupational Exposure Yes     Comment: Dust, Fumes, Noise, Second-hand smoke, & Solvents    Hobby Hazards No    Sleep Concern Yes    Stress Concern No    Weight Concern No    Special Diet No    Back Care No    Exercise Yes     Comment: 3-4 x weekly    Bike Helmet No    Seat Belt Yes    Self-Exams No     Social Drivers of Health     Food Insecurity: No Food Insecurity (6/23/2025)    NCSS - Food Insecurity     Worried About Running Out of Food in the Last Year: No     Ran Out of Food in the Last Year: No   Transportation Needs: No Transportation Needs (6/23/2025)    NCSS - Transportation     Lack of Transportation: No   Housing Stability: Not At Risk (6/23/2025)    NCSS - Housing/Utilities     Has Housing: Yes     Worried About Losing Housing: No     Unable to Get Utilities: No

## 2025-06-23 NOTE — PROGRESS NOTES
The following individual(s) verbally consented to be recorded using ambient AI listening technology and understand that they can each withdraw their consent to this listening technology at any point by asking the clinician to turn off or pause the recording:    Patient name: Marshall Mesa

## 2025-08-13 ENCOUNTER — OFFICE VISIT (OUTPATIENT)
Dept: FAMILY MEDICINE CLINIC | Facility: CLINIC | Age: 55
End: 2025-08-13

## 2025-08-13 VITALS
SYSTOLIC BLOOD PRESSURE: 128 MMHG | BODY MASS INDEX: 25 KG/M2 | TEMPERATURE: 98 F | HEART RATE: 84 BPM | DIASTOLIC BLOOD PRESSURE: 86 MMHG | WEIGHT: 176 LBS | OXYGEN SATURATION: 97 % | RESPIRATION RATE: 20 BRPM

## 2025-08-13 DIAGNOSIS — U07.1 COVID-19 VIRUS INFECTION: Primary | ICD-10-CM

## 2025-08-13 DIAGNOSIS — J02.9 SORE THROAT: ICD-10-CM

## 2025-08-13 DIAGNOSIS — J02.0 STREP PHARYNGITIS: ICD-10-CM

## 2025-08-13 DIAGNOSIS — Z20.822 SUSPECTED COVID-19 VIRUS INFECTION: ICD-10-CM

## 2025-08-13 LAB
CONTROL LINE PRESENT WITH A CLEAR BACKGROUND (YES/NO): YES YES/NO
KIT LOT #: NORMAL NUMERIC
OPERATOR ID: ABNORMAL
RAPID SARS-COV-2 BY PCR: DETECTED
STREP GRP A CUL-SCR: POSITIVE

## 2025-08-13 PROCEDURE — 87880 STREP A ASSAY W/OPTIC: CPT | Performed by: FAMILY MEDICINE

## 2025-08-13 PROCEDURE — 99214 OFFICE O/P EST MOD 30 MIN: CPT | Performed by: FAMILY MEDICINE

## 2025-08-13 PROCEDURE — U0002 COVID-19 LAB TEST NON-CDC: HCPCS | Performed by: FAMILY MEDICINE

## 2025-08-13 RX ORDER — AMOXICILLIN 500 MG/1
500 CAPSULE ORAL 2 TIMES DAILY
Qty: 20 CAPSULE | Refills: 0 | Status: SHIPPED | OUTPATIENT
Start: 2025-08-13 | End: 2025-08-23

## 2025-08-22 ENCOUNTER — LAB ENCOUNTER (OUTPATIENT)
Dept: LAB | Age: 55
End: 2025-08-22
Attending: FAMILY MEDICINE

## 2025-08-22 DIAGNOSIS — I25.10 CORONARY ARTERY DISEASE DUE TO CALCIFIED CORONARY LESION: ICD-10-CM

## 2025-08-22 DIAGNOSIS — I25.84 CORONARY ARTERY DISEASE DUE TO CALCIFIED CORONARY LESION: ICD-10-CM

## 2025-08-22 DIAGNOSIS — Z12.5 ENCOUNTER FOR SCREENING PROSTATE SPECIFIC ANTIGEN (PSA) MEASUREMENT: ICD-10-CM

## 2025-08-22 LAB
ALBUMIN SERPL-MCNC: 4.5 G/DL (ref 3.2–4.8)
ALP LIVER SERPL-CCNC: 66 U/L (ref 45–117)
ALT SERPL-CCNC: 38 U/L (ref 10–49)
AST SERPL-CCNC: 40 U/L (ref ?–34)
BILIRUB DIRECT SERPL-MCNC: 0.4 MG/DL (ref ?–0.3)
BILIRUB SERPL-MCNC: 1 MG/DL (ref 0.3–1.2)
CHOLEST SERPL-MCNC: 123 MG/DL (ref ?–200)
COMPLEXED PSA SERPL-MCNC: 3.25 NG/ML (ref ?–4)
FASTING PATIENT LIPID ANSWER: YES
HDLC SERPL-MCNC: 46 MG/DL (ref 40–59)
LDLC SERPL CALC-MCNC: 59 MG/DL (ref ?–100)
NONHDLC SERPL-MCNC: 77 MG/DL (ref ?–130)
PROT SERPL-MCNC: 7.5 G/DL (ref 5.7–8.2)
TRIGL SERPL-MCNC: 95 MG/DL (ref 30–149)
VLDLC SERPL CALC-MCNC: 14 MG/DL (ref 0–30)

## 2025-08-22 PROCEDURE — 80076 HEPATIC FUNCTION PANEL: CPT

## 2025-08-22 PROCEDURE — 80061 LIPID PANEL: CPT

## 2025-08-22 PROCEDURE — 36415 COLL VENOUS BLD VENIPUNCTURE: CPT

## (undated) DIAGNOSIS — J45.40 MODERATE PERSISTENT ASTHMA WITHOUT COMPLICATION: ICD-10-CM

## (undated) DEVICE — ZZ-DISC-SUB-421016-SUT VCRL 0 L27IN ABSRB VLT L26MM UR-6 5/8-ZZDISC-USE 421016

## (undated) DEVICE — UNIVERSAL STAPLER: Brand: ENDO GIA ULTRA

## (undated) DEVICE — GLOVE SUR 7 SENSICARE PI PIP CRM PWD F

## (undated) DEVICE — TROCAR: Brand: KII SHIELDED BLADED ACCESS SYSTEM

## (undated) DEVICE — Device: Brand: SUTURE PASSOR PRO

## (undated) DEVICE — SLEEVE COMPR MD KNEE LEN SGL USE KENDALL SCD

## (undated) DEVICE — SOLUTION IRRIG 1000ML 0.9% NACL USP BTL

## (undated) DEVICE — PTFE COATED BLADE 2.75': Brand: MEDLINE

## (undated) DEVICE — TROCAR: Brand: KII FIOS FIRST ENTRY

## (undated) DEVICE — HUNTER GASPER TIP, DISPOSABLE: Brand: RENEW

## (undated) DEVICE — POWER SHELL: Brand: SIGNIA

## (undated) DEVICE — LAP CHOLE/APPY CDS-LF: Brand: MEDLINE INDUSTRIES, INC.

## (undated) DEVICE — ARTICULATION RELOAD WITH TRI-STAPLE TECHNOLOGY: Brand: ENDO GIA

## (undated) DEVICE — COVER LT HNDL RIG FOR SUR CAM DISP

## (undated) DEVICE — ARTICULATING RELOAD WITH TRI-STAPLE TECHNOLOGY: Brand: ENDO GIA

## (undated) DEVICE — MUCUS TRAP, 10FR, DELEE, W/VA: Brand: MEDLINE INDUSTRIES, INC.

## (undated) DEVICE — GLOVE SUR 6.5 SENSICARE PI PIP CRM PWD F

## (undated) DEVICE — SUT PDS II 1 36IN ABSRB VLT L36MM CT-1

## (undated) DEVICE — ADHESIVE SKIN TOP FOR WND CLSR DERMBND ADV

## (undated) DEVICE — BANDAGE ADH 1INX3IN NAT FAB N ADH PD CURAD

## (undated) DEVICE — TROCAR: Brand: KII SLEEVE

## (undated) DEVICE — APPLICATOR PREP 26ML CHG 2% ISO ALC 70%

## (undated) DEVICE — DISPOSABLE GRASPER: Brand: EPIX LAPAROSCOPIC GRASPER

## (undated) DEVICE — APPLICATOR STD 6IN COT TIP WOOD HNDL ST

## (undated) DEVICE — MARYLAND JAW LAPAROSCOPIC SEALER/DIVIDER COATED: Brand: LIGASURE

## (undated) DEVICE — SUT COAT VCRL+ 0 27IN UR-6 ABSRB VLT ANTIBACT

## (undated) DEVICE — #15 STERILE STAINLESS BLADE: Brand: STERILE STAINLESS BLADES

## (undated) DEVICE — GLOVE SUR 7.5 SENSICARE PI PIP GRN PWD F

## (undated) DEVICE — POUCH SPECIMEN WIRE 6X3 250ML

## (undated) DEVICE — UNDYED BRAIDED (POLYGLACTIN 910), SYNTHETIC ABSORBABLE SUTURE: Brand: COATED VICRYL

## (undated) DEVICE — 40580 - THE PINK PAD - ADVANCED TRENDELENBURG POSITIONING KIT: Brand: 40580 - THE PINK PAD - ADVANCED TRENDELENBURG POSITIONING KIT

## (undated) DEVICE — TRAY CATH 16FR F INCL BARDX IC COMPLT CARE

## (undated) DEVICE — SUT MCRYL 4-0 18IN PS-2 ABSRB UD 19MM 3/8 CIR

## (undated) DEVICE — TRADITIONAL MARYLAND DISSECTOR TIP, DISPOSABLE: Brand: RENEW

## (undated) DEVICE — ENDOPATH ULTRA VERESS INSUFFLATION NEEDLES WITH LUER LOCK CONNECTORS: Brand: ENDOPATH

## (undated) NOTE — LETTER
Date & Time: 2/6/2025, 4:19 PM  Patient: Marshall Mesa  Encounter Provider(s):    Ron Longo PA-C       To Whom It May Concern:    Marshallperi Prettybrenda was seen and treated in our department on 2/6/2025.  Influenza precautions; patient should quarantine until fever free for 24 hours.  Influenza typically last 7 to 10 days.    If you have any questions or concerns, please do not hesitate to call.        _____________________________  Physician/APC Signature

## (undated) NOTE — LETTER
01/29/21        [address]      Dear Wen Mcgrath,    [facility] records indicate that you have outstanding lab work and or testing that was ordered for you and has not yet been completed:  Orders Placed This Encounter

## (undated) NOTE — LETTER
[address]  [address]  Authorization for Surgical Operation and Procedure     Date:___________                                                                                                         Time:__________  I hereby authorize Surgeon(s):  Chito Lester MD, my physician and his/her assistants (if applicable), which may include medical students, residents, and/or fellows, to perform the following surgical operation/ procedure and administer such anesthesia as may be determined necessary by my physician:  Operation/Procedure name (s) Procedure(s):  LAPAROSCOPIC APPENDECTOMY, POSSIBLE OPEN on Marshall WARNER Mesa   2.   I recognize that during the surgical operation/procedure, unforeseen conditions may necessitate additional or different procedures than those listed above.  I, therefore, further authorize and request that the above-named surgeon, assistants, or designees perform such procedures as are, in their judgment, necessary and desirable.    3.   My surgeon/physician has discussed prior to my surgery the potential benefits, risks and side effects of this procedure; the likelihood of achieving goals; and potential problems that might occur during recuperation.  They also discussed reasonable alternatives to the procedure, including risks, benefits, and side effects related to the alternatives and risks related to not receiving this procedure.  I have had all my questions answered and I acknowledge that no guarantee has been made as to the result that may be obtained.    4.   Should the need arise during my operation/procedure, which includes change of level of care prior to discharge, I also consent to the administration of blood and/or blood products.  Further, I understand that despite careful testing and screening of blood or blood products by collecting agencies, I may still be subject to ill effects as a result of receiving a blood transfusion and/or blood  products.  The following are some, but not all, of the potential risks that can occur: fever and allergic reactions, hemolytic reactions, transmission of diseases such as Hepatitis, AIDS and Cytomegalovirus (CMV) and fluid overload.  In the event that I wish to have an autologous transfusion of my own blood, or a directed donor transfusion, I will discuss this with my physician.  Check only if Refusing Blood or Blood Products  I understand refusal of blood or blood products as deemed necessary by my physician may have serious consequences to my condition to include possible death. I hereby assume responsibility for my refusal and release the hospital, its personnel, and my physicians from any responsibility for the consequences of my refusal.          o  Refuse      5.   I authorize the use of any specimen, organs, tissues, body parts or foreign objects that may be removed from my body during the operation/procedure for diagnosis, research or teaching purposes and their subsequent disposal by hospital authorities.  I also authorize the release of specimen test results and/or written reports to my treating physician on the hospital medical staff or other referring or consulting physicians involved in my care, at the discretion of the Pathologist or my treating physician.    6.   I consent to the photographing or videotaping of the operations or procedures to be performed, including appropriate portions of my body for medical, scientific, or educational purposes, provided my identity is not revealed by the pictures or by descriptive texts accompanying them.  If the procedure has been photographed/videotaped, the surgeon will obtain the original picture, image, videotape or CD.  The hospital will not be responsible for storage, release or maintenance of the picture, image, tape or CD.    7.   I consent to the presence of a  or observers in the operating room as deemed necessary by my physician or  their designees.    8.   I recognize that in the event my procedure results in extended X-Ray/fluoroscopy time, I may develop a skin reaction.    9. If I have a Do Not Attempt Resuscitation (DNAR) order in place, that status will be suspended while in the operating room, procedural suite, and during the recovery period unless otherwise explicitly stated by me (or a person authorized to consent on my behalf). The surgeon or my attending physician will determine when the applicable recovery period ends for purposes of reinstating the DNAR order.  10. Patients having a sterilization procedure: I understand that if the procedure is successful the results will be permanent and it will therefore be impossible for me to inseminate, conceive, or bear children.  I also understand that the procedure is intended to result in sterility, although the result has not been guaranteed.   11. I acknowledge that my physician has explained sedation/analgesia administration to me including the risk and benefits I consent to the administration of sedation/analgesia as may be necessary or desirable in the judgment of my physician.    I CERTIFY THAT I HAVE READ AND FULLY UNDERSTAND THE ABOVE CONSENT TO OPERATION and/or OTHER PROCEDURE.    _________________________________________  __________________________________  Signature of Patient     Signature of Responsible Person         ___________________________________         Printed Name of Responsible Person           _________________________________                 Relationship to Patient  _________________________________________  ______________________________  Signature of Witness          Date  Time      Patient Name: Marshall Mesa     : 1970                 Printed: 2024     Medical Record #: [id number]                     Page 1 of [address] [place] [address]  [id number]    Consent for Anesthesia    I, Marshall P  Moshe agree to be cared for by an anesthesiologist, who is specially trained to monitor me and give me medicine to put me to sleep or keep me comfortable during my procedure    I understand that my anesthesiologist is not an employee or agent of [facility] [employer]. He or she works for [employer].    As the patient asking for anesthesia services, I agree to:  Allow the anesthesiologist (anesthesia doctor) to give me medicine and do additional procedures as necessary. Some examples are: Starting or using an “IV” to give me medicine, fluids or blood during my procedure, and having a breathing tube placed to help me breathe when I’m asleep (intubation). In the event that my heart stops working properly, I understand that my anesthesiologist will make every effort to sustain my life, unless otherwise directed by [facility] Do Not Resuscitate documents.  Tell my anesthesia doctor before my procedure:  If I am pregnant.  The last time that I ate or drank.  All of the medicines I take (including prescriptions, herbal supplements, and pills I can buy without a prescription (including street drugs/illegal medications). Failure to inform my anesthesiologist about these medicines may increase my risk of anesthetic complications.  If I am allergic to anything or have had a reaction to anesthesia before.  I understand how the anesthesia medicine will help me (benefits).  I understand that with any type of anesthesia medicine there are risks:  The most common risks are: nausea, vomiting, sore throat, muscle soreness, damage to my eyes, mouth, or teeth (from breathing tube placement).  Rare risks include: remembering what happened during my procedure, allergic reactions to medications, injury to my airway, heart, lungs, vision, nerves, or muscles and in extremely rare instances death.  My doctor has explained to me other choices available to me for my care (alternatives).  Pregnant  Patients (“epidural”):  I understand that the risks of having an epidural (medicine given into my back to help control pain during labor), include itching, low blood pressure, difficulty urinating, headache or slowing of the baby’s heart. Very rare risks include infection, bleeding, seizure, irregular heart rhythms and nerve injury.  Regional Anesthesia (“spinal”, “epidural”, & “nerve blocks”):  I understand that rare but potential complications include headache, bleeding, infection, seizure, irregular heart rhythms, and nerve injury.    I can change my mind about having anesthesia services at any time before I get the medicine.    _____________________________________________________________________________  Patient (or Representative) Signature/Relationship to Patient  Date   Time    _____________________________________________________________________________   Name (if used)    Language/Organization   Time    _____________________________________________________________________________  Anesthesiologist Signature     Date   Time  I have discussed the procedure and information above with the patient (or patient’s representative) and answered their questions. The patient or their representative has agreed to have anesthesia services.    _____________________________________________________________________________  Witness        Date   Time  I have verified that the signature is that of the patient or patient’s representative, and that it was signed before the procedure  Patient Name: Marshall Mesa     : 1970                 Printed: 2024     Medical Record #: [id number]                     Page 2 of 2

## (undated) NOTE — LETTER
09/18/20        [address]      Dear Floresita Okeefe,    [facility] records indicate that you have outstanding lab work and or testing that was ordered for you and has not yet been completed:        mmm cbc      mmm cmp      mm

## (undated) NOTE — LETTER
21    Patient: Bunny Juares  : 1970 Visit date: 2020    Dear  Roman Sidhu, DO    Thank you for referring Bunny Juares to my practice. Please find my assessment and plan below.     History of Present Illness    Today, I am see CC: No Recipients

## (undated) NOTE — LETTER
ASTHMA ACTION PLAN for Zadie Hamman     : 1970     Date: 2018  Provider:  Tonia Johnson PA-C  Phone for doctor or clinic: [facility], [address] [place] [address], [address] [place] [address]  [phone]

## (undated) NOTE — LETTER
[address]  [address]  Authorization for Surgical Operation and Procedure     Date:___________                                                                                                         Time:__________  I hereby authorize Surgeon(s):  Reba Sena MD, my physician and his/her assistants (if applicable), which may include medical students, residents, and/or fellows, to perform the following surgical operation/ procedure and administer such anesthesia as may be determined necessary by my physician:  Operation/Procedure name (s) Procedure(s):  LAPAROSCOPIC APPENDECTOMY  POSSIBLE OPEN on Marshall WARNER Mesa   2.   I recognize that during the surgical operation/procedure, unforeseen conditions may necessitate additional or different procedures than those listed above.  I, therefore, further authorize and request that the above-named surgeon, assistants, or designees perform such procedures as are, in their judgment, necessary and desirable.    3.   My surgeon/physician has discussed prior to my surgery the potential benefits, risks and side effects of this procedure; the likelihood of achieving goals; and potential problems that might occur during recuperation.  They also discussed reasonable alternatives to the procedure, including risks, benefits, and side effects related to the alternatives and risks related to not receiving this procedure.  I have had all my questions answered and I acknowledge that no guarantee has been made as to the result that may be obtained.    4.   Should the need arise during my operation/procedure, which includes change of level of care prior to discharge, I also consent to the administration of blood and/or blood products.  Further, I understand that despite careful testing and screening of blood or blood products by collecting agencies, I may still be subject to ill effects as a result of receiving a blood transfusion and/or blood  products.  The following are some, but not all, of the potential risks that can occur: fever and allergic reactions, hemolytic reactions, transmission of diseases such as Hepatitis, AIDS and Cytomegalovirus (CMV) and fluid overload.  In the event that I wish to have an autologous transfusion of my own blood, or a directed donor transfusion, I will discuss this with my physician.  Check only if Refusing Blood or Blood Products  I understand refusal of blood or blood products as deemed necessary by my physician may have serious consequences to my condition to include possible death. I hereby assume responsibility for my refusal and release the hospital, its personnel, and my physicians from any responsibility for the consequences of my refusal.          o  Refuse      5.   I authorize the use of any specimen, organs, tissues, body parts or foreign objects that may be removed from my body during the operation/procedure for diagnosis, research or teaching purposes and their subsequent disposal by hospital authorities.  I also authorize the release of specimen test results and/or written reports to my treating physician on the hospital medical staff or other referring or consulting physicians involved in my care, at the discretion of the Pathologist or my treating physician.    6.   I consent to the photographing or videotaping of the operations or procedures to be performed, including appropriate portions of my body for medical, scientific, or educational purposes, provided my identity is not revealed by the pictures or by descriptive texts accompanying them.  If the procedure has been photographed/videotaped, the surgeon will obtain the original picture, image, videotape or CD.  The hospital will not be responsible for storage, release or maintenance of the picture, image, tape or CD.    7.   I consent to the presence of a  or observers in the operating room as deemed necessary by my physician or  their designees.    8.   I recognize that in the event my procedure results in extended X-Ray/fluoroscopy time, I may develop a skin reaction.    9. If I have a Do Not Attempt Resuscitation (DNAR) order in place, that status will be suspended while in the operating room, procedural suite, and during the recovery period unless otherwise explicitly stated by me (or a person authorized to consent on my behalf). The surgeon or my attending physician will determine when the applicable recovery period ends for purposes of reinstating the DNAR order.  10. Patients having a sterilization procedure: I understand that if the procedure is successful the results will be permanent and it will therefore be impossible for me to inseminate, conceive, or bear children.  I also understand that the procedure is intended to result in sterility, although the result has not been guaranteed.   11. I acknowledge that my physician has explained sedation/analgesia administration to me including the risk and benefits I consent to the administration of sedation/analgesia as may be necessary or desirable in the judgment of my physician.    I CERTIFY THAT I HAVE READ AND FULLY UNDERSTAND THE ABOVE CONSENT TO OPERATION and/or OTHER PROCEDURE.    _________________________________________  __________________________________  Signature of Patient     Signature of Responsible Person         ___________________________________         Printed Name of Responsible Person           _________________________________                 Relationship to Patient  _________________________________________  ______________________________  Signature of Witness          Date  Time      Patient Name: Marshall Mesa     : 1970                 Printed: 2024     Medical Record #: [id number]                     Page 1 of [address] [place] [address]  [address]    Consent for Anesthesia    I, Marshall P  Moshe agree to be cared for by an anesthesiologist, who is specially trained to monitor me and give me medicine to put me to sleep or keep me comfortable during my procedure    I understand that my anesthesiologist is not an employee or agent of [facility] [employer]. He or she works for [employer].    As the patient asking for anesthesia services, I agree to:  Allow the anesthesiologist (anesthesia doctor) to give me medicine and do additional procedures as necessary. Some examples are: Starting or using an “IV” to give me medicine, fluids or blood during my procedure, and having a breathing tube placed to help me breathe when I’m asleep (intubation). In the event that my heart stops working properly, I understand that my anesthesiologist will make every effort to sustain my life, unless otherwise directed by [facility] Do Not Resuscitate documents.  Tell my anesthesia doctor before my procedure:  If I am pregnant.  The last time that I ate or drank.  All of the medicines I take (including prescriptions, herbal supplements, and pills I can buy without a prescription (including street drugs/illegal medications). Failure to inform my anesthesiologist about these medicines may increase my risk of anesthetic complications.  If I am allergic to anything or have had a reaction to anesthesia before.  I understand how the anesthesia medicine will help me (benefits).  I understand that with any type of anesthesia medicine there are risks:  The most common risks are: nausea, vomiting, sore throat, muscle soreness, damage to my eyes, mouth, or teeth (from breathing tube placement).  Rare risks include: remembering what happened during my procedure, allergic reactions to medications, injury to my airway, heart, lungs, vision, nerves, or muscles and in extremely rare instances death.  My doctor has explained to me other choices available to me for my care (alternatives).  Pregnant  Patients (“epidural”):  I understand that the risks of having an epidural (medicine given into my back to help control pain during labor), include itching, low blood pressure, difficulty urinating, headache or slowing of the baby’s heart. Very rare risks include infection, bleeding, seizure, irregular heart rhythms and nerve injury.  Regional Anesthesia (“spinal”, “epidural”, & “nerve blocks”):  I understand that rare but potential complications include headache, bleeding, infection, seizure, irregular heart rhythms, and nerve injury.    I can change my mind about having anesthesia services at any time before I get the medicine.    _____________________________________________________________________________  Patient (or Representative) Signature/Relationship to Patient  Date   Time    _____________________________________________________________________________   Name (if used)    Language/Organization   Time    _____________________________________________________________________________  Anesthesiologist Signature     Date   Time  I have discussed the procedure and information above with the patient (or patient’s representative) and answered their questions. The patient or their representative has agreed to have anesthesia services.    _____________________________________________________________________________  Witness        Date   Time  I have verified that the signature is that of the patient or patient’s representative, and that it was signed before the procedure  Patient Name: Marshall Mesa     : 1970                 Printed: 2024     Medical Record #: [id number]                     Page 2 of 2

## (undated) NOTE — Clinical Note
REBEKAHI, TCM call made, see notes. NCM confirmed with patient that he has a HFU scheduled on 12/23/2020 at 7:00, with Vickey Larsen. NCM noted TCM HFU.

## (undated) NOTE — LETTER
Date: 3/31/2023    Patient Name: Shanann Salazar    To Whom it may concern: The above patient was seen at the [facility] for treatment of a medical condition. This patient should be excused from attending work until fever free for 24 hours with no fever reducer and respiratory symptoms are mostly resolved per patient report. The patient is expected to return to work on or around 4/3/23 with no limitations.         Sincerely,        MIKAELA Perry

## (undated) NOTE — MR AVS SNAPSHOT
[facility] [address] [facility]  [phone]               Thank you for choosing us for your health care visit with Dorian Seymour PA-C.   We are glad to serve you and happy to provide you with [facility] Montelukast Sodium 10 MG Tabs   Take 1 tablet (10 mg total) by mouth nightly. Commonly known as:  SINGULAIR           ZYRTEC OR   Take 1 tablet by mouth daily.                 Where to Get Your Medications      These medications were sent to [facility]

## (undated) NOTE — LETTER
Date: 12/23/2020    Patient Name: Christine Martinez          To Whom it may concern: The above patient was seen at the [facility] for treatment of a medical condition.     This patient should be excused from attending work from 12/15/20 esperanza

## (undated) NOTE — MR AVS SNAPSHOT
[facility] [address] [facility]  [phone]               Thank you for choosing us for your health care visit with Alla Brian PA-C.   We are glad to serve you and happy to provide you with [facility] Blood pressure measurement: all men   Cholesterol test: if you are age 39 or older. You may start having this test at an earlier age if you have a family history of high cholesterol.    Fasting blood sugar for type 2 diabetes: if your blood pressure, blood test for people age 15 to 72 at their regular checkups.    Tuberculosis (TB) test: if you have a high risk of TB; for example, because you are a health worker, drug user, or immigrant, or because you have close contact with someone infected with TB   Rachana Hepatitis B shot for all teens and young adults, age 15 to 25 years, who have not had hepatitis or a hepatitis shot and for all adults who are at risk of infection.  This includes, for example, men who have sex with other men, have more than 1 sex partner o time you have sexually contact if you are not in a long-term relationship with just one partner who has no other partners.           Allergies as of Apr 04, 2017     Seasonal                 Today's Vital Signs     BP Pulse Height Weight BMI    142/84 mmHg Corrected Calcium Formula:      ((4.0 - Albumin) x 0.8 + Calcium    Note: Calculation is only valid when Albumin is less than 4.0g/dL.       Alkaline Phosphatase 85  U/L    AST 34 15-41 U/L    Alt 46 17-63 U/L    Bilirubin, Total 0.9 0.1-2.0 mg/dL TSH 1.680 0.350-5.500 mIU/mL                PSA SCREEN      Component Value Standard Range & Units    Prostate Specific Antigen Screen 1.09 0.01-4.00 ng/mL      INTERPRETIVE INFORMATION: TOTAL PROSTATE SPECIFIC ANTIGEN:   Kasi payan Lifestyle Modification Recommendations:    Modification Recommendation   Weight Reduction Maintain normal body weight (body mass index 18.5-24.9 kg/m2)   DASH eating plan Adopt a diet rich in fruits, vegetables, and low fat dairy products with reduced cont motivated   Don’t forget strength training with weights and resistance Set goals and track your progress   You don’t need to join a gym. Home exercises work great.  Put more priority on exercise in your life                    Visit [facility]

## (undated) NOTE — LETTER
ASTHMA ACTION PLAN for Lata Yancey     : 1970     Date: 2020  Provider:  Jessica Cheek PA-C  Phone for doctor or clinic: [address] [place] [address], [address] [place] [address], [address] [place], [place] [address]  [phone] Provider  Helena Malhotra PA-C   Patient Caretaker

## (undated) NOTE — Clinical Note
ASTHMA ACTION PLAN for Alonzo Jerome     : 1970     Date: 2017  Provider:  Sil Palencia PA-C  Phone for doctor or clinic: [address] [facility], [address] [place] [address], [address] [place] [address]  [phone]

## (undated) NOTE — LETTER
[address]  [address]  Authorization for Surgical Operation and Procedure     Date:___________                                                                                                         Time:__________  I hereby authorize Surgeon(s):  Chito Lester MD, my physician and his/her assistants (if applicable), which may include medical students, residents, and/or fellows, to perform the following surgical operation/ procedure and administer such anesthesia as may be determined necessary by my physician:  Operation/Procedure name (s) Procedure(s):  LAPAROSCOPIC APPENDECTOMY, POSSIBLE OPEN on Marshall WARNER Mesa   2.   I recognize that during the surgical operation/procedure, unforeseen conditions may necessitate additional or different procedures than those listed above.  I, therefore, further authorize and request that the above-named surgeon, assistants, or designees perform such procedures as are, in their judgment, necessary and desirable.    3.   My surgeon/physician has discussed prior to my surgery the potential benefits, risks and side effects of this procedure; the likelihood of achieving goals; and potential problems that might occur during recuperation.  They also discussed reasonable alternatives to the procedure, including risks, benefits, and side effects related to the alternatives and risks related to not receiving this procedure.  I have had all my questions answered and I acknowledge that no guarantee has been made as to the result that may be obtained.    4.   Should the need arise during my operation/procedure, which includes change of level of care prior to discharge, I also consent to the administration of blood and/or blood products.  Further, I understand that despite careful testing and screening of blood or blood products by collecting agencies, I may still be subject to ill effects as a result of receiving a blood transfusion and/or blood  products.  The following are some, but not all, of the potential risks that can occur: fever and allergic reactions, hemolytic reactions, transmission of diseases such as Hepatitis, AIDS and Cytomegalovirus (CMV) and fluid overload.  In the event that I wish to have an autologous transfusion of my own blood, or a directed donor transfusion, I will discuss this with my physician.  Check only if Refusing Blood or Blood Products  I understand refusal of blood or blood products as deemed necessary by my physician may have serious consequences to my condition to include possible death. I hereby assume responsibility for my refusal and release the hospital, its personnel, and my physicians from any responsibility for the consequences of my refusal.          o  Refuse      5.   I authorize the use of any specimen, organs, tissues, body parts or foreign objects that may be removed from my body during the operation/procedure for diagnosis, research or teaching purposes and their subsequent disposal by hospital authorities.  I also authorize the release of specimen test results and/or written reports to my treating physician on the hospital medical staff or other referring or consulting physicians involved in my care, at the discretion of the Pathologist or my treating physician.    6.   I consent to the photographing or videotaping of the operations or procedures to be performed, including appropriate portions of my body for medical, scientific, or educational purposes, provided my identity is not revealed by the pictures or by descriptive texts accompanying them.  If the procedure has been photographed/videotaped, the surgeon will obtain the original picture, image, videotape or CD.  The hospital will not be responsible for storage, release or maintenance of the picture, image, tape or CD.    7.   I consent to the presence of a  or observers in the operating room as deemed necessary by my physician or  their designees.    8.   I recognize that in the event my procedure results in extended X-Ray/fluoroscopy time, I may develop a skin reaction.    9. If I have a Do Not Attempt Resuscitation (DNAR) order in place, that status will be suspended while in the operating room, procedural suite, and during the recovery period unless otherwise explicitly stated by me (or a person authorized to consent on my behalf). The surgeon or my attending physician will determine when the applicable recovery period ends for purposes of reinstating the DNAR order.  10. Patients having a sterilization procedure: I understand that if the procedure is successful the results will be permanent and it will therefore be impossible for me to inseminate, conceive, or bear children.  I also understand that the procedure is intended to result in sterility, although the result has not been guaranteed.   11. I acknowledge that my physician has explained sedation/analgesia administration to me including the risk and benefits I consent to the administration of sedation/analgesia as may be necessary or desirable in the judgment of my physician.    I CERTIFY THAT I HAVE READ AND FULLY UNDERSTAND THE ABOVE CONSENT TO OPERATION and/or OTHER PROCEDURE.    _________________________________________  __________________________________  Signature of Patient     Signature of Responsible Person         ___________________________________         Printed Name of Responsible Person           _________________________________                 Relationship to Patient  _________________________________________  ______________________________  Signature of Witness          Date  Time      Patient Name: Marshall Mesa     : 1970                 Printed: 2024     Medical Record #: [id number]                     Page 1 of [address] [place] [address]  [id number]    Consent for Anesthesia    I, Marshall P  Moshe agree to be cared for by an anesthesiologist, who is specially trained to monitor me and give me medicine to put me to sleep or keep me comfortable during my procedure    I understand that my anesthesiologist is not an employee or agent of [facility] [employer]. He or she works for [email or web address].    As the patient asking for anesthesia services, I agree to:  Allow the anesthesiologist (anesthesia doctor) to give me medicine and do additional procedures as necessary. Some examples are: Starting or using an “IV” to give me medicine, fluids or blood during my procedure, and having a breathing tube placed to help me breathe when I’m asleep (intubation). In the event that my heart stops working properly, I understand that my anesthesiologist will make every effort to sustain my life, unless otherwise directed by [facility] Do Not Resuscitate documents.  Tell my anesthesia doctor before my procedure:  If I am pregnant.  The last time that I ate or drank.  All of the medicines I take (including prescriptions, herbal supplements, and pills I can buy without a prescription (including street drugs/illegal medications). Failure to inform my anesthesiologist about these medicines may increase my risk of anesthetic complications.  If I am allergic to anything or have had a reaction to anesthesia before.  I understand how the anesthesia medicine will help me (benefits).  I understand that with any type of anesthesia medicine there are risks:  The most common risks are: nausea, vomiting, sore throat, muscle soreness, damage to my eyes, mouth, or teeth (from breathing tube placement).  Rare risks include: remembering what happened during my procedure, allergic reactions to medications, injury to my airway, heart, lungs, vision, nerves, or muscles and in extremely rare instances death.  My doctor has explained to me other choices available to me for my care (alternatives).  Pregnant  Patients (“epidural”):  I understand that the risks of having an epidural (medicine given into my back to help control pain during labor), include itching, low blood pressure, difficulty urinating, headache or slowing of the baby’s heart. Very rare risks include infection, bleeding, seizure, irregular heart rhythms and nerve injury.  Regional Anesthesia (“spinal”, “epidural”, & “nerve blocks”):  I understand that rare but potential complications include headache, bleeding, infection, seizure, irregular heart rhythms, and nerve injury.    I can change my mind about having anesthesia services at any time before I get the medicine.    _____________________________________________________________________________  Patient (or Representative) Signature/Relationship to Patient  Date   Time    _____________________________________________________________________________   Name (if used)    Language/Organization   Time    _____________________________________________________________________________  Anesthesiologist Signature     Date   Time  I have discussed the procedure and information above with the patient (or patient’s representative) and answered their questions. The patient or their representative has agreed to have anesthesia services.    _____________________________________________________________________________  Witness        Date   Time  I have verified that the signature is that of the patient or patient’s representative, and that it was signed before the procedure  Patient Name: Marshall Mesa     : 1970                 Printed: 2024     Medical Record #: [id number]                     Page 2 of 2

## (undated) NOTE — LETTER
Date & Time: 7/18/2022, 12:22 PM  Patient: Dedrick Marie  Encounter Provider(s):    Dustin Childers MD       To Whom It May Concern:    Delane Saint was seen and treated in our department on 7/18/2022. He should not return to work until Fever free for 24.     If you have any questions or concerns, please do not hesitate to call.        _____________________________  Physician/APC Signature